# Patient Record
Sex: FEMALE | Race: BLACK OR AFRICAN AMERICAN | NOT HISPANIC OR LATINO | Employment: FULL TIME | URBAN - METROPOLITAN AREA
[De-identification: names, ages, dates, MRNs, and addresses within clinical notes are randomized per-mention and may not be internally consistent; named-entity substitution may affect disease eponyms.]

---

## 2017-04-04 ENCOUNTER — OFFICE VISIT (OUTPATIENT)
Dept: FAMILY MEDICINE | Facility: HOSPITAL | Age: 27
End: 2017-04-04
Attending: FAMILY MEDICINE
Payer: COMMERCIAL

## 2017-04-04 VITALS
WEIGHT: 204.13 LBS | HEART RATE: 89 BPM | SYSTOLIC BLOOD PRESSURE: 113 MMHG | DIASTOLIC BLOOD PRESSURE: 80 MMHG | HEIGHT: 62 IN | BODY MASS INDEX: 37.56 KG/M2

## 2017-04-04 DIAGNOSIS — J06.9 VIRAL UPPER RESPIRATORY TRACT INFECTION: ICD-10-CM

## 2017-04-04 DIAGNOSIS — R10.9 FLANK PAIN: Primary | ICD-10-CM

## 2017-04-04 LAB
B-HCG UR QL: NEGATIVE
BILIRUB SERPL-MCNC: 1 MG/DL
BLOOD, POC UA: NORMAL
CTP QC/QA: YES
GLUCOSE UR QL STRIP: NORMAL
KETONES UR QL STRIP: NORMAL
LEUKOCYTE ESTERASE URINE, POC: NORMAL
NITRITE, POC UA: NORMAL
PH, POC UA: 6
PROTEIN, POC: NORMAL
SPECIFIC GRAVITY, POC UA: 1.02
UROBILINOGEN, POC UA: NORMAL

## 2017-04-04 PROCEDURE — 99213 OFFICE O/P EST LOW 20 MIN: CPT | Performed by: FAMILY MEDICINE

## 2017-04-04 PROCEDURE — 81025 URINE PREGNANCY TEST: CPT | Performed by: FAMILY MEDICINE

## 2017-04-04 PROCEDURE — 81000 URINALYSIS NONAUTO W/SCOPE: CPT | Performed by: FAMILY MEDICINE

## 2017-04-04 RX ORDER — METHOCARBAMOL 500 MG/1
500 TABLET, FILM COATED ORAL 4 TIMES DAILY
Qty: 30 TABLET | Refills: 0 | Status: SHIPPED | OUTPATIENT
Start: 2017-04-04 | End: 2017-04-14

## 2017-04-04 RX ORDER — NAPROXEN 500 MG/1
500 TABLET ORAL 2 TIMES DAILY PRN
Qty: 60 TABLET | Refills: 0 | Status: SHIPPED | OUTPATIENT
Start: 2017-04-04 | End: 2018-07-19

## 2017-04-04 NOTE — PROGRESS NOTES
Subjective:       Patient ID: Evelin Taylor is a 26 y.o. female.    Chief Complaint: L sided abd pain    HPI   Pt is a 25 yo F here for an urgent visit with complaints of L flank pain. She states that it started 3 days. No inciting event or trauma. Sharp pain, lasting 3 minutes with no alleviating or aggravating symptoms. No f/c/cp/sob/n/v/urinary and bowel symptoms. Has not tried anything for the pain. Pt states her LMP was from 3/17 to 3/26. +sexually active. Currently no d/c or bleeding.     Review of Systems   Constitutional: Negative for chills, fatigue and fever.   HENT: Negative for sneezing and sore throat.    Eyes: Negative for pain.   Respiratory: Negative for cough, chest tightness, shortness of breath and wheezing.    Cardiovascular: Negative for chest pain and palpitations.   Gastrointestinal: Negative for abdominal pain, constipation, diarrhea, nausea and vomiting.   Genitourinary: Negative for dysuria.   Musculoskeletal: Negative for back pain.        L flank pain   Skin: Negative for rash.   Neurological: Negative for headaches.   Psychiatric/Behavioral: Negative for behavioral problems.       Objective:      Vitals:    04/04/17 1014   BP: 113/80   Pulse: 89     Physical Exam   Constitutional: She is oriented to person, place, and time. She appears well-developed and well-nourished.   Non toxic   HENT:   Head: Normocephalic and atraumatic.   Eyes: Conjunctivae are normal. Pupils are equal, round, and reactive to light.   Neck: Normal range of motion.   Cardiovascular: Normal rate, normal heart sounds and intact distal pulses.    Pulmonary/Chest: Effort normal and breath sounds normal. She has no wheezes. She exhibits no tenderness.   Abdominal: Soft. Bowel sounds are normal.   L side mild TTP to flank region. NO CVA. Mild TTP to LLQ, no rebound or guarding.    Neurological: She is alert and oriented to person, place, and time.   Skin: Skin is warm.   Psychiatric: She has a normal mood and  affect.       Assessment:       1. Flank pain            Plan:       Flank pain  - Likely MSK, will tx symptomatically  -     POCT URINALYSIS - wnl aside from trace leuk, no symptoms.   -     POCT Urine Pregnancy - neg  -     methocarbamol (ROBAXIN) 500 MG Tab; Take 1 tablet (500 mg total) by mouth 4 (four) times daily.  Dispense: 30 tablet; Refill: 0  -     naproxen (NAPROSYN) 500 MG tablet; Take 1 tablet (500 mg total) by mouth 2 (two) times daily as needed (take twice a day with meal for 3 days than as needed for pain).  Dispense: 60 tablet; Refill: 0  - Advised pt if symptoms worsen to return to clinic or seek medical attention    Return if symptoms worsen or fail to improve.

## 2017-10-31 ENCOUNTER — OFFICE VISIT (OUTPATIENT)
Dept: FAMILY MEDICINE | Facility: HOSPITAL | Age: 27
End: 2017-10-31
Attending: FAMILY MEDICINE
Payer: COMMERCIAL

## 2017-10-31 VITALS
HEART RATE: 77 BPM | BODY MASS INDEX: 40.08 KG/M2 | SYSTOLIC BLOOD PRESSURE: 109 MMHG | DIASTOLIC BLOOD PRESSURE: 69 MMHG | WEIGHT: 217.81 LBS | HEIGHT: 62 IN

## 2017-10-31 DIAGNOSIS — Z00.00 ANNUAL PHYSICAL EXAM: Primary | ICD-10-CM

## 2017-10-31 DIAGNOSIS — Z28.21 INFLUENZA VACCINATION DECLINED BY PATIENT: ICD-10-CM

## 2017-10-31 PROCEDURE — 99213 OFFICE O/P EST LOW 20 MIN: CPT | Performed by: FAMILY MEDICINE

## 2017-10-31 NOTE — PROGRESS NOTES
Subjective:       Patient ID: Evelin Taylor is a 26 y.o. female.    Chief Complaint: Annual Exam    HPI   Ms. Taylor is a 27 yo female with no significant PMHx presenting for an annual physical exam. Patient has no complaints. She states that she is in the process of becoming a  and therefore needs a physical exam. Patient reports feeling well and states that she only takes naproxen for menstrual cramps. She denies any tobacco, EtOH or illicit drug use. Denies any F/C, HA, SOB or chest pain.    Review of Systems   Constitutional: Negative for chills and fever.   HENT: Negative for congestion and rhinorrhea.    Respiratory: Negative for cough, shortness of breath and wheezing.    Cardiovascular: Negative for chest pain.   Gastrointestinal: Negative for abdominal pain, nausea and vomiting.   Genitourinary: Negative for difficulty urinating.   Musculoskeletal: Negative for back pain and neck pain.   Neurological: Negative for dizziness and headaches.       Objective:      Vitals:    10/31/17 0849   BP: 109/69   Pulse: 77     Physical Exam   Constitutional: She is oriented to person, place, and time. No distress.   HENT:   Head: Normocephalic and atraumatic.   Cardiovascular: Normal rate, regular rhythm and normal heart sounds.    No murmur heard.  Pulmonary/Chest: Effort normal and breath sounds normal. No respiratory distress. She has no wheezes.   Abdominal: Soft. Bowel sounds are normal. She exhibits no distension. There is no tenderness.   Musculoskeletal: Normal range of motion. She exhibits no edema or tenderness.   Neurological: She is alert and oriented to person, place, and time.   Skin: She is not diaphoretic.       Assessment:       1. Annual physical exam    2. Influenza vaccination declined by patient        Plan:       Annual physical exam  - Physical examination form for  applicant completed and given to patient  - Patient states that she saw her OB/Gyn this year and  had her Pap smear done at the time.    Influenza vaccination declined by patient      Return in about 1 year (around 10/31/2018), or if symptoms worsen or fail to improve.

## 2018-01-16 ENCOUNTER — OFFICE VISIT (OUTPATIENT)
Dept: FAMILY MEDICINE | Facility: HOSPITAL | Age: 28
End: 2018-01-16
Attending: FAMILY MEDICINE
Payer: COMMERCIAL

## 2018-01-16 VITALS
SYSTOLIC BLOOD PRESSURE: 122 MMHG | HEART RATE: 80 BPM | HEIGHT: 62 IN | BODY MASS INDEX: 41.26 KG/M2 | DIASTOLIC BLOOD PRESSURE: 83 MMHG | WEIGHT: 224.19 LBS

## 2018-01-16 DIAGNOSIS — R10.9 ABDOMINAL CRAMPS: ICD-10-CM

## 2018-01-16 DIAGNOSIS — R11.0 NAUSEA: ICD-10-CM

## 2018-01-16 DIAGNOSIS — K52.9 GASTROENTERITIS: Primary | ICD-10-CM

## 2018-01-16 PROCEDURE — 99213 OFFICE O/P EST LOW 20 MIN: CPT | Performed by: FAMILY MEDICINE

## 2018-01-16 RX ORDER — IBUPROFEN 600 MG/1
600 TABLET ORAL EVERY 8 HOURS PRN
Qty: 21 TABLET | Refills: 0 | Status: SHIPPED | OUTPATIENT
Start: 2018-01-16 | End: 2018-01-23

## 2018-01-16 RX ORDER — ONDANSETRON 4 MG/1
4 TABLET, FILM COATED ORAL EVERY 8 HOURS PRN
Qty: 21 TABLET | Refills: 0 | Status: SHIPPED | OUTPATIENT
Start: 2018-01-16 | End: 2018-01-23

## 2018-01-16 NOTE — LETTER
January 16, 2018    Evelin Taylor  [address]   [place] [address]         [facility] [facility] [place]  [place] [address]  Phone: [phone]  Fax: [phone] January 16, 2018     Patient: Evelin Taylor   YOB: 1990   Date of Visit: 1/16/2018       To Whom It May Concern:    Ms. Evelin Taylor was seen in our facility on 01/16/18 for an acute illness. She is clear to return to work on 01/19/18.     If you have any questions or concerns, please don't hesitate to call.    Sincerely,        Atilio Roman MD

## 2018-01-16 NOTE — PROGRESS NOTES
Subjective:       Patient ID: Evelin Taylor is a 27 y.o. female.    Chief Complaint: Nausea and Diarrhea    Ms. Taylor is a 27 year old female with no PMH who presents urgently for diarrhea and N. Patient reports the symptoms started yesterday after work with diarrhea. She tried pepto-bismal which did not help much. The diarrhea continued and she tried imodium this AM without any help. Has been eating and drinking without V but with continued diarrhea. No current N. Occ abd cramping with episodes. No sick contacts, does report eating popeyes a few days ago which may have made her sick.   Denies Etoh, tobacco.      Review of Systems   Constitutional: Negative for chills and fever.   HENT: Negative for ear pain, hearing loss and sore throat.    Eyes: Negative for pain and redness.   Respiratory: Negative for cough and shortness of breath.    Cardiovascular: Negative for chest pain and palpitations.   Gastrointestinal: Positive for abdominal pain, diarrhea and nausea. Negative for vomiting.   Genitourinary: Negative for difficulty urinating and dysuria.   Musculoskeletal: Negative for arthralgias and myalgias.   Skin: Negative for rash.   Neurological: Negative for syncope and headaches.   Psychiatric/Behavioral: Negative for agitation and confusion.       Objective:      Vitals:    01/16/18 1526   BP: 122/83   Pulse: 80     Physical Exam   Constitutional: She is oriented to person, place, and time. She appears well-developed and well-nourished. No distress.   HENT:   Head: Normocephalic and atraumatic.   Right Ear: External ear normal.   Left Ear: External ear normal.   Eyes: Conjunctivae are normal. Pupils are equal, round, and reactive to light. Right eye exhibits no discharge. Left eye exhibits no discharge.   Neck: Normal range of motion. Neck supple.   Cardiovascular: Normal rate and regular rhythm.  Exam reveals no gallop and no friction rub.    No murmur heard.  Pulmonary/Chest: Effort normal and  breath sounds normal. No respiratory distress. She has no wheezes. She has no rales.   Abdominal: Soft. Bowel sounds are normal. She exhibits no distension. There is no tenderness.   Minor TTP, no rebound or guarding.    Musculoskeletal: Normal range of motion.   Neurological: She is alert and oriented to person, place, and time. She has normal reflexes.   Skin: Skin is warm. No rash noted. No erythema.   Psychiatric: She has a normal mood and affect. Her behavior is normal.       Assessment:       1. Gastroenteritis    2. Nausea    3. Abdominal cramps        Plan:       Gastroenteritis  -     ibuprofen (ADVIL,MOTRIN) 600 MG tablet; Take 1 tablet (600 mg total) by mouth every 8 (eight) hours as needed for Pain.  Dispense: 21 tablet; Refill: 0  -     ondansetron (ZOFRAN) 4 MG tablet; Take 1 tablet (4 mg total) by mouth every 8 (eight) hours as needed for Nausea.  Dispense: 21 tablet; Refill: 0    Nausea  -     ondansetron (ZOFRAN) 4 MG tablet; Take 1 tablet (4 mg total) by mouth every 8 (eight) hours as needed for Nausea.  Dispense: 21 tablet; Refill: 0    Abdominal cramps  -     ibuprofen (ADVIL,MOTRIN) 600 MG tablet; Take 1 tablet (600 mg total) by mouth every 8 (eight) hours as needed for Pain.  Dispense: 21 tablet; Refill: 0    Counseled on diet, small frequent meals and return precautions.     Follow-up if symptoms worsen or fail to improve.

## 2018-05-24 ENCOUNTER — OFFICE VISIT (OUTPATIENT)
Dept: FAMILY MEDICINE | Facility: HOSPITAL | Age: 28
End: 2018-05-24
Attending: FAMILY MEDICINE
Payer: COMMERCIAL

## 2018-05-24 ENCOUNTER — HOSPITAL ENCOUNTER (OUTPATIENT)
Dept: RADIOLOGY | Facility: HOSPITAL | Age: 28
Discharge: HOME | End: 2018-05-24
Attending: STUDENT IN AN ORGANIZED HEALTH CARE EDUCATION/TRAINING PROGRAM
Payer: COMMERCIAL

## 2018-05-24 VITALS
BODY MASS INDEX: 41.99 KG/M2 | DIASTOLIC BLOOD PRESSURE: 81 MMHG | SYSTOLIC BLOOD PRESSURE: 126 MMHG | HEIGHT: 62 IN | HEART RATE: 78 BPM | WEIGHT: 228.19 LBS

## 2018-05-24 DIAGNOSIS — I88.9 CERVICAL LYMPHADENITIS: ICD-10-CM

## 2018-05-24 DIAGNOSIS — I88.9 CERVICAL LYMPHADENITIS: Primary | ICD-10-CM

## 2018-05-24 PROCEDURE — 71046 X-RAY EXAM CHEST 2 VIEWS: CPT | Mod: TC,FY

## 2018-05-24 PROCEDURE — 99213 OFFICE O/P EST LOW 20 MIN: CPT | Mod: 25 | Performed by: STUDENT IN AN ORGANIZED HEALTH CARE EDUCATION/TRAINING PROGRAM

## 2018-05-24 PROCEDURE — 71046 X-RAY EXAM CHEST 2 VIEWS: CPT | Mod: 26,,, | Performed by: RADIOLOGY

## 2018-05-24 RX ORDER — PNV 112/IRON/FOLIC/OM3/DHA/EPA 3.33-.33MG
TABLET,CHEWABLE ORAL
COMMUNITY
Start: 2018-02-21 | End: 2018-07-19

## 2018-05-24 NOTE — PROGRESS NOTES
Subjective:       Patient ID: Evelin Taylor is a 27 y.o. female.    Chief Complaint: Neck Pain    Neck Pain    Pertinent negatives include no chest pain, fever or headaches.      28 yo F w/ h/o cervical lymphatitis who presents for urgent evaluation of neck pain. Neck pain has been present since 2 days ago. Denies any trauma to the area. Patient has a history of recurrent cervical lymphadenitis with prior surgery in 2005. Biopsy obtained at that time revealed no malignancy. Patient unsure of other details surrounding the workup and surgery as she was only 14 at the time of surgery. No records available on Epic. No headaches, facial tenderness. She denies f/c, n/v/d, cough, sob, palpitations, sore throat, rhinorrhea, or ear drainage.      Review of Systems   Constitutional: Negative for chills and fever.   HENT: Negative for congestion and sore throat.         Enlarged lymph node.   Respiratory: Negative for cough, shortness of breath and wheezing.    Cardiovascular: Negative for chest pain, palpitations and leg swelling.   Gastrointestinal: Negative for abdominal pain, constipation, diarrhea, nausea and vomiting.   Genitourinary: Negative for dysuria.   Musculoskeletal: Positive for neck pain.   Neurological: Negative for dizziness, light-headedness and headaches.   Psychiatric/Behavioral: Negative for agitation.       Objective:      Vitals:    05/24/18 0957   BP: 126/81   Pulse: 78     Physical Exam   Constitutional: She is oriented to person, place, and time. She appears well-developed and well-nourished.   HENT:   Head: Normocephalic.   Right Ear: External ear normal. No drainage.   Left Ear: External ear normal. No drainage.   Nose: No rhinorrhea. Right sinus exhibits no maxillary sinus tenderness and no frontal sinus tenderness. Left sinus exhibits no maxillary sinus tenderness and no frontal sinus tenderness.   Mouth/Throat: Oropharynx is clear and moist and mucous membranes are normal. No  oropharyngeal exudate. No tonsillar exudate.   Eyes: EOM are normal. Pupils are equal, round, and reactive to light.   Neck: Normal range of motion. Neck supple. No neck rigidity. No thyromegaly present.   Enlarged L cervical lymph node.   Cardiovascular: Normal rate, regular rhythm and normal heart sounds.  Exam reveals no gallop and no friction rub.    No murmur heard.  Pulmonary/Chest: Effort normal and breath sounds normal. She has no wheezes. She has no rales.   Abdominal: Soft. Bowel sounds are normal. She exhibits no distension. There is no tenderness.   Lymphadenopathy:     She has no cervical adenopathy.   Neurological: She is alert and oriented to person, place, and time.   Skin: Skin is warm and dry.   Psychiatric: She has a normal mood and affect. Her behavior is normal. Judgment and thought content normal.   Nursing note and vitals reviewed.      Assessment:       1. Cervical lymphadenitis        Plan:       Evelin was seen today for neck pain.    Diagnoses and all orders for this visit:    Cervical lymphadenitis  -     CBC auto differential; Future  -     Comprehensive metabolic panel; Future  -     TSH; Future  -     X-Ray Chest PA And Lateral; Future    Will attempt to obtain medical records from [facility], where the surgery was performed in 2005.    Follow-up in about 2 weeks (around 6/7/2018).

## 2018-05-24 NOTE — PROGRESS NOTES
I have reviewed the notes, assessments, and/or procedures performed, I concur with her/his documentation of Evelin Taylor.

## 2018-05-28 ENCOUNTER — TELEPHONE (OUTPATIENT)
Dept: FAMILY MEDICINE | Facility: HOSPITAL | Age: 28
End: 2018-05-28

## 2018-05-28 NOTE — TELEPHONE ENCOUNTER
----- Message from Taylor Trujillo sent at 5/25/2018  8:18 AM CDT -----  Patient wants to talk about her test results

## 2018-05-29 ENCOUNTER — TELEPHONE (OUTPATIENT)
Dept: FAMILY MEDICINE | Facility: HOSPITAL | Age: 28
End: 2018-05-29

## 2018-05-29 NOTE — TELEPHONE ENCOUNTER
----- Message from Taylor Trujillo sent at 5/29/2018 10:55 AM CDT -----  Patient wants  to explain to her about her test results

## 2018-07-19 ENCOUNTER — OFFICE VISIT (OUTPATIENT)
Dept: FAMILY MEDICINE | Facility: HOSPITAL | Age: 28
End: 2018-07-19
Attending: FAMILY MEDICINE
Payer: COMMERCIAL

## 2018-07-19 VITALS
WEIGHT: 229.5 LBS | HEART RATE: 88 BPM | BODY MASS INDEX: 42.23 KG/M2 | HEIGHT: 62 IN | DIASTOLIC BLOOD PRESSURE: 78 MMHG | SYSTOLIC BLOOD PRESSURE: 120 MMHG

## 2018-07-19 DIAGNOSIS — R30.0 DYSURIA: ICD-10-CM

## 2018-07-19 DIAGNOSIS — N39.0 URINARY TRACT INFECTION WITHOUT HEMATURIA, SITE UNSPECIFIED: Primary | ICD-10-CM

## 2018-07-19 PROCEDURE — 99213 OFFICE O/P EST LOW 20 MIN: CPT | Performed by: FAMILY MEDICINE

## 2018-07-19 RX ORDER — SULFAMETHOXAZOLE AND TRIMETHOPRIM 800; 160 MG/1; MG/1
1 TABLET ORAL 2 TIMES DAILY
Qty: 6 TABLET | Refills: 0 | Status: SHIPPED | OUTPATIENT
Start: 2018-07-19 | End: 2018-07-22

## 2018-07-19 NOTE — PROGRESS NOTES
Subjective:       Patient ID: Evelin Taylor is a 27 y.o. female.    Chief Complaint: side pain and acne    Evelin Taylor is a 27 y.o. female presenting urgently for a 2 wk Hx R flank pain and a 2 d Hx acne exacerbation.  She has no pertinent PMH.  Her flank pain is described as moderate and associated with burning on urination.  She denies fever, chills, N/V, increased frequency, or urine color change.  She is sexually active with one partner and does not use protection. LMP 6/26.      Review of Systems   Constitutional: Negative for activity change, appetite change, chills and fever.   HENT: Negative for congestion and sore throat.    Respiratory: Negative for chest tightness and shortness of breath.    Cardiovascular: Negative for chest pain and palpitations.   Gastrointestinal: Negative for abdominal distention, abdominal pain, diarrhea, nausea and vomiting.   Genitourinary: Positive for difficulty urinating, dysuria and flank pain. Negative for decreased urine volume, frequency, hematuria, menstrual problem, pelvic pain, urgency, vaginal bleeding, vaginal discharge and vaginal pain.   Musculoskeletal: Negative for arthralgias and myalgias.   Skin: Negative.    Neurological: Negative for dizziness, syncope and headaches.   Psychiatric/Behavioral: Negative.        Objective:      Vitals:    07/19/18 1002   BP: 120/78   Pulse: 88     Physical Exam   Constitutional: She is oriented to person, place, and time. She appears well-developed and well-nourished.   HENT:   Head: Normocephalic and atraumatic.   Eyes: Conjunctivae and EOM are normal.   Neck: Normal range of motion. Neck supple.   Cardiovascular: Normal rate, regular rhythm, normal heart sounds and intact distal pulses.  Exam reveals no gallop and no friction rub.    No murmur heard.  Pulmonary/Chest: Effort normal and breath sounds normal. No respiratory distress.   Abdominal: Soft. There is no tenderness.   Musculoskeletal: Normal range  of motion. She exhibits no edema.   No CVA tenderness. No suprapubic tenderness.   Neurological: She is alert and oriented to person, place, and time.   Skin: Skin is warm and dry. Capillary refill takes less than 2 seconds.   Psychiatric: She has a normal mood and affect. Her behavior is normal.   Nursing note and vitals reviewed.      LABS:    -UA trace leukocytes   -UPT neg  Assessment:       1. Urinary tract infection without hematuria, site unspecified    2. Dysuria        Plan:       Urinary tract infection without hematuria, site unspecified  -     sulfamethoxazole-trimethoprim 800-160mg (BACTRIM DS) 800-160 mg Tab; Take 1 tablet by mouth 2 (two) times daily. for 3 days  Dispense: 6 tablet; Refill: 0    Dysuria  -     POCT URINALYSIS  -     POCT URINE PREGNANCY  -     C. trachomatis/N. gonorrhoeae by AMP DNA Urine    Other orders  -     benzoyl peroxide (BREVOXYL) 4 % external liquid; Apply topically 2 (two) times daily.  Dispense: 1 Bottle; Refill: 12      Follow-up in about 1 week (around 7/26/2018), or if symptoms worsen or fail to improve.

## 2018-07-20 ENCOUNTER — TELEPHONE (OUTPATIENT)
Dept: FAMILY MEDICINE | Facility: HOSPITAL | Age: 28
End: 2018-07-20

## 2018-07-20 NOTE — TELEPHONE ENCOUNTER
----- Message from Taylor Trujillo sent at 7/19/2018  4:00 PM CDT -----  Contact: [phone]  Mami from [facility] pharmacy called to let  know they do not carry benzoyl peroxide (BREVOXYL) 4 % external liquid. Want to know if  can sent a different prescription

## 2018-07-20 NOTE — TELEPHONE ENCOUNTER
----- Message from Ana Hood MA sent at 7/20/2018  9:15 AM CDT -----  Please call patient; pharmacy does not have the peroxide rx that was prescribed.  Thanks.

## 2018-07-30 RX ORDER — CLINDAMYCIN AND BENZOYL PEROXIDE 10; 50 MG/G; MG/G
GEL TOPICAL 2 TIMES DAILY
Qty: 50 G | Refills: 11 | Status: SHIPPED | OUTPATIENT
Start: 2018-07-30 | End: 2019-05-13

## 2018-08-02 NOTE — PROGRESS NOTES
I was present in clinic during the visit and assume the primary medical care of this patient.  I discussed the case with Dr. Tubbs, and I have reviewed and agree with the assessment and plan.

## 2018-09-06 ENCOUNTER — OFFICE VISIT (OUTPATIENT)
Dept: FAMILY MEDICINE | Facility: HOSPITAL | Age: 28
End: 2018-09-06
Attending: FAMILY MEDICINE
Payer: COMMERCIAL

## 2018-09-06 VITALS
HEART RATE: 84 BPM | DIASTOLIC BLOOD PRESSURE: 88 MMHG | HEIGHT: 62 IN | SYSTOLIC BLOOD PRESSURE: 125 MMHG | BODY MASS INDEX: 41.06 KG/M2 | WEIGHT: 223.13 LBS

## 2018-09-06 DIAGNOSIS — K12.1 ULCER (TRAUMATIC) OF ORAL MUCOSA: ICD-10-CM

## 2018-09-06 PROCEDURE — 99213 OFFICE O/P EST LOW 20 MIN: CPT | Performed by: STUDENT IN AN ORGANIZED HEALTH CARE EDUCATION/TRAINING PROGRAM

## 2018-09-06 NOTE — PROGRESS NOTES
"Subjective:       Patient ID: Evelin Taylor is a 27 y.o. female.    Chief Complaint: Edema (lip)    HPI Ms. Taylor is a 28yo female with no PMHx who presents to the clinic with lip swelling of 3 days duration. She states the swelling started on Tuesday, Sept 4, as a small bump and then it proceeded to grow in size as well as have small white bumps. It is located on the inside of her left upper lip. She has not tried anything to treat this swelling. She denies any allergies, trying new foods, new make-up, new topical lip balms, or history of cold sores. She states it is not really painful, but more bothersome because of the increased swelling. It has not bled. She recently got braces (on August 30), and has not been able to eat much since then due to her teeth feeling "tight." She brushes her teeth per the dentist's instructions and uses a prescribed mouth wash.     ROS: as above.     Objective:      Vitals:    09/06/18 0957   BP: 125/88   Pulse: 84     Physical Exam   Constitutional: She appears well-developed and well-nourished.   HENT:   Head: Normocephalic and atraumatic.   Mouth/Throat: Uvula is midline, oropharynx is clear and moist and mucous membranes are normal. Oral lesions present. Abnormal dentition (Braces in place upper and lower).       Eyes: Conjunctivae and EOM are normal. Pupils are equal, round, and reactive to light.   Neck: Trachea normal, normal range of motion, full passive range of motion without pain and phonation normal. Neck supple. No thyroid mass and no thyromegaly present.   Lymphadenopathy:     She has no cervical adenopathy.       Assessment:       1. Ulcer (traumatic) of oral mucosa        Plan:       Ulcer (traumatic) of oral mucosa  -Patient likely has traumatic ulcer related to recent braces on left upper inner lip mucosa. Discussed using orajel over the counter for symptomatic relief. She will also continue using her mouthwash and brushing her teeth thoroughly per her " orthodontist to prevent infection. Discussed decreasing irritation of the area by being mindful when eating and drinking as well as not chewing her lip as this can make the swelling worse.     Follow-up if symptoms worsen or fail to improve.

## 2018-09-13 NOTE — PROGRESS NOTES
I examined the patient, and assume primary medical care.  I reviewed the case with Dr. Gutierrez, and I have reviewed and agree with the History, physical, assessment and plan.

## 2018-11-18 ENCOUNTER — HOSPITAL ENCOUNTER (EMERGENCY)
Facility: HOSPITAL | Age: 28
Discharge: HOME | End: 2018-11-18
Attending: EMERGENCY MEDICINE
Payer: COMMERCIAL

## 2018-11-18 VITALS
DIASTOLIC BLOOD PRESSURE: 93 MMHG | BODY MASS INDEX: 33.13 KG/M2 | SYSTOLIC BLOOD PRESSURE: 134 MMHG | HEIGHT: 62 IN | TEMPERATURE: 99 F | HEART RATE: 86 BPM | RESPIRATION RATE: 14 BRPM | WEIGHT: 180 LBS | OXYGEN SATURATION: 99 %

## 2018-11-18 DIAGNOSIS — M54.50 ACUTE BILATERAL LOW BACK PAIN WITHOUT SCIATICA: Primary | ICD-10-CM

## 2018-11-18 PROCEDURE — 99283 EMERGENCY DEPT VISIT LOW MDM: CPT

## 2018-11-18 RX ORDER — METHOCARBAMOL 500 MG/1
500 TABLET, FILM COATED ORAL 3 TIMES DAILY
Qty: 15 TABLET | Refills: 0 | Status: SHIPPED | OUTPATIENT
Start: 2018-11-18 | End: 2018-11-23
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
## 2018-11-27 ENCOUNTER — OFFICE VISIT (OUTPATIENT)
Dept: FAMILY MEDICINE | Facility: HOSPITAL | Age: 28
End: 2018-11-27
Attending: FAMILY MEDICINE
Payer: COMMERCIAL

## 2018-11-27 VITALS
BODY MASS INDEX: 41.26 KG/M2 | WEIGHT: 224.19 LBS | HEART RATE: 82 BPM | SYSTOLIC BLOOD PRESSURE: 129 MMHG | HEIGHT: 62 IN | DIASTOLIC BLOOD PRESSURE: 83 MMHG

## 2018-11-27 DIAGNOSIS — R05.8 POST-VIRAL COUGH SYNDROME: ICD-10-CM

## 2018-11-27 DIAGNOSIS — S29.012A MUSCLE STRAIN OF LEFT UPPER BACK, INITIAL ENCOUNTER: Primary | ICD-10-CM

## 2018-11-27 PROCEDURE — 99213 OFFICE O/P EST LOW 20 MIN: CPT | Performed by: FAMILY MEDICINE

## 2018-11-27 RX ORDER — FLUTICASONE PROPIONATE 50 MCG
1 SPRAY, SUSPENSION (ML) NASAL DAILY
Qty: 16 G | Refills: 0 | Status: SHIPPED | OUTPATIENT
Start: 2018-11-27 | End: 2019-05-13

## 2018-11-27 RX ORDER — CYCLOBENZAPRINE HCL 10 MG
10 TABLET ORAL NIGHTLY
Qty: 10 TABLET | Refills: 0 | Status: SHIPPED | OUTPATIENT
Start: 2018-11-27 | End: 2018-12-07

## 2018-11-27 RX ORDER — IBUPROFEN 600 MG/1
600 TABLET ORAL 3 TIMES DAILY
Qty: 30 TABLET | Refills: 0 | Status: SHIPPED | OUTPATIENT
Start: 2018-11-27 | End: 2018-12-07
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
## 2018-11-27 NOTE — PROGRESS NOTES
I have reviewed the notes, assessments, and/or procedures performed by Dr. Melendez, I concur with her/his documentation of Evelin Taylor.

## 2019-01-16 ENCOUNTER — OFFICE VISIT (OUTPATIENT)
Dept: FAMILY MEDICINE | Facility: HOSPITAL | Age: 29
End: 2019-01-16
Attending: FAMILY MEDICINE
Payer: COMMERCIAL

## 2019-01-16 VITALS
HEIGHT: 62 IN | SYSTOLIC BLOOD PRESSURE: 124 MMHG | WEIGHT: 225.5 LBS | HEART RATE: 90 BPM | DIASTOLIC BLOOD PRESSURE: 86 MMHG | BODY MASS INDEX: 41.49 KG/M2

## 2019-01-16 DIAGNOSIS — H60.391 OTHER INFECTIVE ACUTE OTITIS EXTERNA OF RIGHT EAR: Primary | ICD-10-CM

## 2019-01-16 DIAGNOSIS — S00.411A ABRASION OF RIGHT EAR CANAL, INITIAL ENCOUNTER: ICD-10-CM

## 2019-01-16 PROBLEM — H60.501 ACUTE OTITIS EXTERNA OF RIGHT EAR: Status: ACTIVE | Noted: 2019-01-16

## 2019-01-16 PROCEDURE — 99213 OFFICE O/P EST LOW 20 MIN: CPT | Performed by: STUDENT IN AN ORGANIZED HEALTH CARE EDUCATION/TRAINING PROGRAM

## 2019-01-16 RX ORDER — CIPROFLOXACIN AND DEXAMETHASONE 3; 1 MG/ML; MG/ML
4 SUSPENSION/ DROPS AURICULAR (OTIC) 2 TIMES DAILY
Qty: 7.5 ML | Refills: 0 | Status: SHIPPED | OUTPATIENT
Start: 2019-01-16 | End: 2019-01-23

## 2019-01-16 NOTE — PROGRESS NOTES
Subjective:       Patient ID: Evelin Taylor is a 28 y.o. female.    Chief Complaint: Object in Right ear   HPI   27 y/o female with no pmhx presents to the clinic complaining of right ear pain after using a Q-tip. Patient states that she was cleaning her ears 2 days ago, when the q-tip broke and patient felt object that remained in her ear. Patient denies any associated fevers, chills n/v, HA, sore throat and ear discharge. Patient did not try to remove any objects from ear prior to visit today. No other complaints.     Review of Systems   Constitutional: Negative for activity change, chills, fatigue and fever.   HENT: Positive for ear pain. Negative for hearing loss, sore throat and trouble swallowing.    Eyes: Negative for pain, discharge, redness and itching.   Respiratory: Negative for chest tightness and shortness of breath.    Cardiovascular: Negative for chest pain.   Gastrointestinal: Negative for abdominal pain.   Endocrine: Negative for polyuria.   Genitourinary: Negative for dysuria, pelvic pain and urgency.   Musculoskeletal: Negative for arthralgias.   Skin: Negative for color change.   Allergic/Immunologic: Negative for immunocompromised state.   Neurological: Negative for headaches.   Hematological: Negative for adenopathy.   Psychiatric/Behavioral: The patient is not nervous/anxious.        Objective:      Vitals:    01/16/19 1019   BP: 124/86   Pulse: 90     Physical Exam   Constitutional: She is oriented to person, place, and time. She appears well-developed and well-nourished.   HENT:   Head: Normocephalic and atraumatic.   Right ear canal is erythematous. No purulence noted. Trauma on aspect of epithelium anterior to the TM on the right.    Eyes: EOM are normal. Pupils are equal, round, and reactive to light.   Neck: Normal range of motion. Neck supple.   Cardiovascular: Normal rate, regular rhythm and normal heart sounds.   Pulmonary/Chest: Effort normal and breath sounds normal.    Abdominal: Soft. Bowel sounds are normal.   Musculoskeletal: Normal range of motion.   Neurological: She is alert and oriented to person, place, and time.   Skin: Skin is warm and dry. Capillary refill takes less than 2 seconds.   Psychiatric: She has a normal mood and affect.       Assessment:       1. Other infective acute otitis externa of right ear    2. Abrasion of right ear canal, initial encounter        Plan:       Other infective acute otitis externa of right ear  -     ciprofloxacin-dexamethasone 0.3-0.1% (CIPRODEX); Place  into the right ear 2 (two) times daily. for 7 days     Abrasion of right ear canal, initial encounter  -     As per treatment above.  -     Patient instructed to return to clinic if no improvement after 5 days to be evaluated.     Follow-up if symptoms worsen or fail to improve.     Quan Castillo MD  [facility], PGY-1

## 2019-03-10 ENCOUNTER — OFFICE VISIT (OUTPATIENT)
Dept: URGENT CARE | Facility: CLINIC | Age: 29
End: 2019-03-10
Payer: COMMERCIAL

## 2019-03-10 VITALS
TEMPERATURE: 99 F | WEIGHT: 189 LBS | SYSTOLIC BLOOD PRESSURE: 130 MMHG | RESPIRATION RATE: 18 BRPM | HEIGHT: 62 IN | DIASTOLIC BLOOD PRESSURE: 91 MMHG | HEART RATE: 78 BPM | BODY MASS INDEX: 34.78 KG/M2 | OXYGEN SATURATION: 99 %

## 2019-03-10 DIAGNOSIS — R30.0 DYSURIA: ICD-10-CM

## 2019-03-10 DIAGNOSIS — N34.2 URETHRITIS: Primary | ICD-10-CM

## 2019-03-10 DIAGNOSIS — L24.9 IRRITANT DERMATITIS: ICD-10-CM

## 2019-03-10 LAB
BILIRUB UR QL STRIP: NEGATIVE
GLUCOSE UR QL STRIP: NEGATIVE
KETONES UR QL STRIP: NEGATIVE
LEUKOCYTE ESTERASE UR QL STRIP: POSITIVE
PH, POC UA: 6 (ref 5–8)
POC BLOOD, URINE: NEGATIVE
POC NITRATES, URINE: NEGATIVE
PROT UR QL STRIP: NEGATIVE
SP GR UR STRIP: 1.02 (ref 1–1.03)
UROBILINOGEN UR STRIP-ACNC: NORMAL (ref 0.1–1.1)

## 2019-03-10 PROCEDURE — 81003 POCT URINALYSIS, DIPSTICK, AUTOMATED, W/O SCOPE: ICD-10-PCS | Mod: QW,S$GLB,, | Performed by: PHYSICIAN ASSISTANT

## 2019-03-10 PROCEDURE — 81003 URINALYSIS AUTO W/O SCOPE: CPT | Mod: QW,S$GLB,, | Performed by: PHYSICIAN ASSISTANT

## 2019-03-10 PROCEDURE — 99214 OFFICE O/P EST MOD 30 MIN: CPT | Mod: 25,S$GLB,, | Performed by: PHYSICIAN ASSISTANT

## 2019-03-10 PROCEDURE — 3008F PR BODY MASS INDEX (BMI) DOCUMENTED: ICD-10-PCS | Mod: CPTII,S$GLB,, | Performed by: PHYSICIAN ASSISTANT

## 2019-03-10 PROCEDURE — 3008F BODY MASS INDEX DOCD: CPT | Mod: CPTII,S$GLB,, | Performed by: PHYSICIAN ASSISTANT

## 2019-03-10 PROCEDURE — 99214 PR OFFICE/OUTPT VISIT, EST, LEVL IV, 30-39 MIN: ICD-10-PCS | Mod: 25,S$GLB,, | Performed by: PHYSICIAN ASSISTANT

## 2019-03-10 RX ORDER — HYDROCORTISONE 25 MG/G
CREAM TOPICAL 2 TIMES DAILY
Qty: 28 G | Refills: 0 | Status: SHIPPED | OUTPATIENT
Start: 2019-03-10 | End: 2019-05-13

## 2019-03-10 RX ORDER — NITROFURANTOIN 25; 75 MG/1; MG/1
100 CAPSULE ORAL 2 TIMES DAILY
Qty: 14 CAPSULE | Refills: 0 | Status: SHIPPED | OUTPATIENT
Start: 2019-03-10 | End: 2019-03-17

## 2019-03-10 NOTE — PATIENT INSTRUCTIONS
Urethritis in Women     An inflamed urethra can cause pain during urination.   Urethritis occurs when the urethra is inflamed (red and swollen). This is the tube that passes urine from the bladder to outside the body. The urethra can become swollen and cause burning pain when you urinate. You may also have pain with sex. It can cause pain in the abdomen or pelvis. A urethral or vaginal discharge may also occur.  What causes urethritis?  Urethritis can be caused by a bacterial or viral infection. Such an infection can lead to conditions such as a urinary tract infection (UTI) or sexually transmitted disease (STD). Urethritis can also be caused by injury or sensitivity or allergy to chemicals in lotions and other products.  How is urethritis diagnosed?  Your healthcare provider will examine you and ask about your symptoms and health history. You may also have one or more of the following tests:  · Urine test to take samples of urine and have them checked for problems.  · Blood test to take a sample of blood and have it checked for problems.  · Vaginal culture to take a sample of vaginal discharge to have it tested for problems. A cotton swab is inserted into the vagina.  · Cystoscopy to allow the healthcare provider to look for problems in the urinary tract. The test uses a thin, flexible telescope called a cystoscope with a light and camera attached. The scope is inserted into the urethra.  · Ultrasound to allow the healthcare provider to see a detailed image of the inside of your pelvis. Ultrasound will not show whether you have urethritis, but it may show other signs of sexually transmitted diseases that can also cause urethritis. Ultrasound will not show whether you have urethritis, but it may show other signs of sexually transmitted diseases that can also cause urethritis.  · Nucleic acid test (LINDA) to detect the presence of a virus or bacteria. It may be done instead of a culture because it allows for a faster  diagnosis.  How is urethritis treated?  Treatment depends on the cause of urethritis. If its due to a bacterial infection, antibiotics (medicines that fight infection) will be given. Your healthcare provider can tell you more about your treatment options. In the meantime, your symptoms can be treated. To relieve pain and swelling, anti-inflammatory medicines, such as ibuprofen, may be given. Untreated, symptoms may get worse. It can also cause scar tissue to form in the urethra, causing it to narrow. And, it can lead to pelvic inflammatory disease.  When to call your healthcare provider   Call the healthcare provider right away if you have any of the following:  · Fever of 100.4°F (38.0°C) or higher   · Burning pain with urination  · Abdominal or pelvic pain  · Increased urge to urinate  · Discharge from the vagina      Preventing STDs  When it comes to sex, its important to take care and be safe. Any sexual contact with the penis, vagina, anus, or mouth can spread an STD. The only sure way to prevent STDs is abstinence (not having sex). But there are ways to make sex safer. Use a latex condom each time you have sex. And talk to your partner about STDs before you have sex.  Date Last Reviewed: 1/1/2017  © 7054-7095 [employer]. [address] [place] [address], [place] [address]. All rights reserved. This information is not intended as a substitute for professional medical care. Always follow your healthcare professional's instructions.        Contact Dermatitis  Contact dermatitis is a skin rash caused by something that touches the skin and makes it irritated and inflamed. Your skin may be red, swollen, dry, and may be cracked. Blisters may form and ooze. The rash will itch.  Contact dermatitis can form on the face and neck, backs of hands, forearms, genitals, and lower legs.  People can get contact dermatitis from lots of sources. These include:  · Plants such as poison ivy, oak, or sumac  · Chemicals in  "hair dyes and rinses, soaps, solvents, waxes, fingernail polish, and deodorants   · Jewelry or watchbands made of nickel  Contact dermatitis is not passed from person to person.  Talk with your healthcare provider about what may have caused the rash. A type of allergy testing called "patch testing" may be used to discover what you are allergic to. You will need to avoid the source of your rash in the future to prevent it from coming back.  Treatment is done to relieve itching and prevent the rash from coming back. The rash should go away in a few days to a few weeks.  Home care  Your healthcare provider may prescribe medicine to relieve swelling and itching. Follow all instructions when using these medicines.  General care:  · Avoid anything that heats up your skin, such as hot showers or baths, or direct sunlight. This can make itching worse.  · Apply cold compresses to soothe your sores to help relieve your symptoms. Do this for 30 minutes 3 to 4 times a day. You can make a cold compress by soaking a cloth in cold water. Squeeze out excess water. You can add colloidal oatmeal to the water to help reduce itching. For severe itching in a small area, apply an ice pack wrapped in a thin towel. Do this for 20 minutes 3 to 4 times a day.  · You can also try wet dressings. One way to do this is to wear a wet piece of clothing under a dry one. Wear a damp shirt under a dry shirt if your upper body is affected. This can relieve itching and prevent you from scratching the affected area.  · You can also help relieve large areas of itching by taking a lukewarm bath with colloidal oatmeal added to the water.  · Use hydrocortisone cream for redness and irritation, unless another medicine was prescribed. You can also use benzocaine anesthetic cream or spray. Calamine lotion can also relieve mild symptoms.  · Use oral diphenhydramine to help reduce itching. You can buy this antihistamine at drug and grocery stores. It can make " you sleepy, so use lower doses during the daytime. Or you can use loratadine. This is an antihistamine that will not make you sleepy. Do not use diphenhydramine if you have glaucoma or have trouble urinating due to an enlarged prostate.  · If a plant causes your rash, make sure to wash your skin and the clothes you were wearing when you came into contact with the plant. This is to wash away the plant oils that gave you the rash and prevent more or worse symptoms.  · Stay away from the substance or object that causes your symptoms. If you cant avoid it, wear gloves or some other type of protection.  Follow-up care  Follow up with your healthcare provider, or as advised.  When to seek medical advice  Call your healthcare provider right away if any of these occur:  · Spreading of the rash to other parts of your body  · Severe swelling of your face, eyelids, mouth, throat or tongue  · Trouble urinating due to swelling in the genital area  · Fever of 100.4°F (38°C) or higher  · Redness or swelling that gets worse  · Pain that gets worse  · Foul-smelling fluid leaking from the skin  · Yellow-brown crusts on the open blisters  Date Last Reviewed: 9/1/2016  © 0889-1016 [employer]. [address] [place] [address], [place] [address]. All rights reserved. This information is not intended as a substitute for professional medical care. Always follow your healthcare professional's instructions.    Bath bomb is source of irritant urethritis and an irritant dermatitis.  Discontinue use.    Please follow up with your Primary care provider within 2-5 days if your signs and symptoms have not resolved or worsen.     If your condition worsens or fails to improve we recommend that you receive another evaluation at the emergency room immediately or contact your primary medical clinic to discuss your concerns.   You must understand that you have received an Urgent Care treatment only and that you may be released before all of your  medical problems are known or treated. You, the patient, will arrange for follow up care as instructed.     RED FLAGS/WARNING SYMPTOMS DISCUSSED WITH PATIENT THAT WOULD WARRANT EMERGENT MEDICAL ATTENTION. PATIENT VERBALIZED UNDERSTANDING.     YOU HAVE BEEN GIVEN A PRESCRIPTION FOR ANTIBIOTICS. IT WAS ADVISED TO DELAY THE COURSE OF ANTIBIOTICS FOR 2-3 DAYS IF SYMPTOMS DO NOT RESOLVE. IT IMPORTANT TO FOLLOW THESE INSTRUCTIONS AS ANTIBIOTIC RESISTANCE IS HIGH. YOUR SYMPTOMS WILL LIKELY RESOLVE WITHOUT THIS PRESCRIPTIONS.

## 2019-03-10 NOTE — PROGRESS NOTES
"Subjective:       Patient ID: Evelin Taylor is a 28 y.o. female.    Vitals:  height is 5' 2" (1.575 m) and weight is 85.7 kg (189 lb). Her oral temperature is 98.9 °F (37.2 °C). Her blood pressure is 130/91 (abnormal) and her pulse is 78. Her respiration is 18 and oxygen saturation is 99%.     Chief Complaint: Vaginal Itching (burning after using a bath bomb x1 day)    Vaginal Itching   The patient's primary symptoms include genital itching. The patient's pertinent negatives include no genital odor, genital rash, missed menses or pelvic pain. This is a new problem. The current episode started yesterday. The problem occurs constantly. The problem has been unchanged. The patient is experiencing no pain. The problem affects both sides. She is not pregnant. Pertinent negatives include no abdominal pain, back pain, chills, dysuria, fever, flank pain, frequency, headaches, hematuria, nausea, rash, urgency or vomiting. She has tried nothing for the symptoms. The treatment provided no relief. She is sexually active. No, her partner does not have an STD. She uses nothing for contraception. Her menstrual history has been regular. Her past medical history is significant for a gynecological surgery. There is no history of an abdominal surgery, a  section, an ectopic pregnancy, ovarian cysts, PID or an STD.       Constitution: Negative for chills and fever.   Neck: Negative for painful lymph nodes.   Gastrointestinal: Negative for abdominal pain, history of abdominal surgery, nausea and vomiting.   Genitourinary: Positive for vaginal pain. Negative for dysuria, frequency, urgency, urine decreased, flank pain, hematuria, history of kidney stones, painful menstruation, irregular menstruation, missed menses, heavy menstrual bleeding, ovarian cysts, genital trauma, vaginal bleeding, vaginal odor, painful intercourse, genital sore, painful ejaculation and pelvic pain.   Musculoskeletal: Negative for back pain. "   Skin: Negative for rash and lesion.   Neurological: Negative for headaches.   Hematologic/Lymphatic: Negative for swollen lymph nodes.       Objective:      Physical Exam    Assessment:       1. Urethritis    2. Dysuria    3. Irritant dermatitis        Plan:         Urethritis  -     nitrofurantoin, macrocrystal-monohydrate, (MACROBID) 100 MG capsule; Take 1 capsule (100 mg total) by mouth 2 (two) times daily. for 7 days  Dispense: 14 capsule; Refill: 0    Dysuria  -     POCT Urinalysis, Dipstick, Automated, W/O Scope    Irritant dermatitis  -     hydrocortisone 2.5 % cream; Apply topically 2 (two) times daily.  Dispense: 28 g; Refill: 0          Urethritis in Women     An inflamed urethra can cause pain during urination.   Urethritis occurs when the urethra is inflamed (red and swollen). This is the tube that passes urine from the bladder to outside the body. The urethra can become swollen and cause burning pain when you urinate. You may also have pain with sex. It can cause pain in the abdomen or pelvis. A urethral or vaginal discharge may also occur.  What causes urethritis?  Urethritis can be caused by a bacterial or viral infection. Such an infection can lead to conditions such as a urinary tract infection (UTI) or sexually transmitted disease (STD). Urethritis can also be caused by injury or sensitivity or allergy to chemicals in lotions and other products.  How is urethritis diagnosed?  Your healthcare provider will examine you and ask about your symptoms and health history. You may also have one or more of the following tests:  · Urine test to take samples of urine and have them checked for problems.  · Blood test to take a sample of blood and have it checked for problems.  · Vaginal culture to take a sample of vaginal discharge to have it tested for problems. A cotton swab is inserted into the vagina.  · Cystoscopy to allow the healthcare provider to look for problems in the urinary tract. The test uses a  thin, flexible telescope called a cystoscope with a light and camera attached. The scope is inserted into the urethra.  · Ultrasound to allow the healthcare provider to see a detailed image of the inside of your pelvis. Ultrasound will not show whether you have urethritis, but it may show other signs of sexually transmitted diseases that can also cause urethritis. Ultrasound will not show whether you have urethritis, but it may show other signs of sexually transmitted diseases that can also cause urethritis.  · Nucleic acid test (LINDA) to detect the presence of a virus or bacteria. It may be done instead of a culture because it allows for a faster diagnosis.  How is urethritis treated?  Treatment depends on the cause of urethritis. If its due to a bacterial infection, antibiotics (medicines that fight infection) will be given. Your healthcare provider can tell you more about your treatment options. In the meantime, your symptoms can be treated. To relieve pain and swelling, anti-inflammatory medicines, such as ibuprofen, may be given. Untreated, symptoms may get worse. It can also cause scar tissue to form in the urethra, causing it to narrow. And, it can lead to pelvic inflammatory disease.  When to call your healthcare provider   Call the healthcare provider right away if you have any of the following:  · Fever of 100.4°F (38.0°C) or higher   · Burning pain with urination  · Abdominal or pelvic pain  · Increased urge to urinate  · Discharge from the vagina      Preventing STDs  When it comes to sex, its important to take care and be safe. Any sexual contact with the penis, vagina, anus, or mouth can spread an STD. The only sure way to prevent STDs is abstinence (not having sex). But there are ways to make sex safer. Use a latex condom each time you have sex. And talk to your partner about STDs before you have sex.  Date Last Reviewed: 1/1/2017  © 3684-7952 [employer]. [address] [facility],  "[place] [address]. All rights reserved. This information is not intended as a substitute for professional medical care. Always follow your healthcare professional's instructions.        Contact Dermatitis  Contact dermatitis is a skin rash caused by something that touches the skin and makes it irritated and inflamed. Your skin may be red, swollen, dry, and may be cracked. Blisters may form and ooze. The rash will itch.  Contact dermatitis can form on the face and neck, backs of hands, forearms, genitals, and lower legs.  People can get contact dermatitis from lots of sources. These include:  · Plants such as poison ivy, oak, or sumac  · Chemicals in hair dyes and rinses, soaps, solvents, waxes, fingernail polish, and deodorants   · Jewelry or watchbands made of nickel  Contact dermatitis is not passed from person to person.  Talk with your healthcare provider about what may have caused the rash. A type of allergy testing called "patch testing" may be used to discover what you are allergic to. You will need to avoid the source of your rash in the future to prevent it from coming back.  Treatment is done to relieve itching and prevent the rash from coming back. The rash should go away in a few days to a few weeks.  Home care  Your healthcare provider may prescribe medicine to relieve swelling and itching. Follow all instructions when using these medicines.  General care:  · Avoid anything that heats up your skin, such as hot showers or baths, or direct sunlight. This can make itching worse.  · Apply cold compresses to soothe your sores to help relieve your symptoms. Do this for 30 minutes 3 to 4 times a day. You can make a cold compress by soaking a cloth in cold water. Squeeze out excess water. You can add colloidal oatmeal to the water to help reduce itching. For severe itching in a small area, apply an ice pack wrapped in a thin towel. Do this for 20 minutes 3 to 4 times a day.  · You can also try wet dressings. " One way to do this is to wear a wet piece of clothing under a dry one. Wear a damp shirt under a dry shirt if your upper body is affected. This can relieve itching and prevent you from scratching the affected area.  · You can also help relieve large areas of itching by taking a lukewarm bath with colloidal oatmeal added to the water.  · Use hydrocortisone cream for redness and irritation, unless another medicine was prescribed. You can also use benzocaine anesthetic cream or spray. Calamine lotion can also relieve mild symptoms.  · Use oral diphenhydramine to help reduce itching. You can buy this antihistamine at drug and grocery stores. It can make you sleepy, so use lower doses during the daytime. Or you can use loratadine. This is an antihistamine that will not make you sleepy. Do not use diphenhydramine if you have glaucoma or have trouble urinating due to an enlarged prostate.  · If a plant causes your rash, make sure to wash your skin and the clothes you were wearing when you came into contact with the plant. This is to wash away the plant oils that gave you the rash and prevent more or worse symptoms.  · Stay away from the substance or object that causes your symptoms. If you cant avoid it, wear gloves or some other type of protection.  Follow-up care  Follow up with your healthcare provider, or as advised.  When to seek medical advice  Call your healthcare provider right away if any of these occur:  · Spreading of the rash to other parts of your body  · Severe swelling of your face, eyelids, mouth, throat or tongue  · Trouble urinating due to swelling in the genital area  · Fever of 100.4°F (38°C) or higher  · Redness or swelling that gets worse  · Pain that gets worse  · Foul-smelling fluid leaking from the skin  · Yellow-brown crusts on the open blisters  Date Last Reviewed: 9/1/2016  © 6848-6006 [employer]. [address] [place] [address], [place] [address]. All rights reserved. This information is  not intended as a substitute for professional medical care. Always follow your healthcare professional's instructions.    Bath bomb is source of irritant urethritis and an irritant dermatitis.  Discontinue use.    Please follow up with your Primary care provider within 2-5 days if your signs and symptoms have not resolved or worsen.     If your condition worsens or fails to improve we recommend that you receive another evaluation at the emergency room immediately or contact your primary medical clinic to discuss your concerns.   You must understand that you have received an Urgent Care treatment only and that you may be released before all of your medical problems are known or treated. You, the patient, will arrange for follow up care as instructed.     RED FLAGS/WARNING SYMPTOMS DISCUSSED WITH PATIENT THAT WOULD WARRANT EMERGENT MEDICAL ATTENTION. PATIENT VERBALIZED UNDERSTANDING.       YOU HAVE BEEN GIVEN A PRESCRIPTION FOR ANTIBIOTICS. IT WAS ADVISED TO DELAY THE COURSE OF ANTIBIOTICS FOR 2-3 DAYS IF SYMPTOMS DO NOT RESOLVE. IT IMPORTANT TO FOLLOW THESE INSTRUCTIONS AS ANTIBIOTIC RESISTANCE IS HIGH. YOUR SYMPTOMS WILL LIKELY RESOLVE WITHOUT THIS PRESCRIPTIONS.

## 2019-05-13 ENCOUNTER — OFFICE VISIT (OUTPATIENT)
Dept: URGENT CARE | Facility: CLINIC | Age: 29
End: 2019-05-13
Payer: COMMERCIAL

## 2019-05-13 VITALS
SYSTOLIC BLOOD PRESSURE: 136 MMHG | OXYGEN SATURATION: 100 % | RESPIRATION RATE: 16 BRPM | HEART RATE: 74 BPM | WEIGHT: 189 LBS | DIASTOLIC BLOOD PRESSURE: 92 MMHG | TEMPERATURE: 99 F | BODY MASS INDEX: 34.78 KG/M2 | HEIGHT: 62 IN

## 2019-05-13 DIAGNOSIS — R07.9 CHEST PAIN, UNSPECIFIED TYPE: Primary | ICD-10-CM

## 2019-05-13 DIAGNOSIS — M62.838 MUSCLE SPASM: ICD-10-CM

## 2019-05-13 DIAGNOSIS — M54.9 BACK PAIN, UNSPECIFIED BACK LOCATION, UNSPECIFIED BACK PAIN LATERALITY, UNSPECIFIED CHRONICITY: ICD-10-CM

## 2019-05-13 PROCEDURE — 99214 PR OFFICE/OUTPT VISIT, EST, LEVL IV, 30-39 MIN: ICD-10-PCS | Mod: S$GLB,,, | Performed by: PHYSICIAN ASSISTANT

## 2019-05-13 PROCEDURE — 93010 EKG 12-LEAD: ICD-10-PCS | Mod: S$GLB,,, | Performed by: STUDENT IN AN ORGANIZED HEALTH CARE EDUCATION/TRAINING PROGRAM

## 2019-05-13 PROCEDURE — 93010 EKG 12-LEAD: ICD-10-PCS | Mod: S$GLB,,, | Performed by: INTERNAL MEDICINE

## 2019-05-13 PROCEDURE — 71046 XR CHEST PA AND LATERAL: ICD-10-PCS | Mod: FY,S$GLB,, | Performed by: RADIOLOGY

## 2019-05-13 PROCEDURE — 93005 ELECTROCARDIOGRAM TRACING: CPT | Mod: S$GLB,,, | Performed by: PHYSICIAN ASSISTANT

## 2019-05-13 PROCEDURE — 71046 X-RAY EXAM CHEST 2 VIEWS: CPT | Mod: FY,S$GLB,, | Performed by: RADIOLOGY

## 2019-05-13 PROCEDURE — 93005 EKG 12-LEAD: ICD-10-PCS | Mod: S$GLB,,, | Performed by: PHYSICIAN ASSISTANT

## 2019-05-13 PROCEDURE — 99214 OFFICE O/P EST MOD 30 MIN: CPT | Mod: S$GLB,,, | Performed by: PHYSICIAN ASSISTANT

## 2019-05-13 PROCEDURE — 3008F PR BODY MASS INDEX (BMI) DOCUMENTED: ICD-10-PCS | Mod: CPTII,S$GLB,, | Performed by: PHYSICIAN ASSISTANT

## 2019-05-13 PROCEDURE — 93010 ELECTROCARDIOGRAM REPORT: CPT | Mod: S$GLB,,, | Performed by: STUDENT IN AN ORGANIZED HEALTH CARE EDUCATION/TRAINING PROGRAM

## 2019-05-13 PROCEDURE — 93010 ELECTROCARDIOGRAM REPORT: CPT | Mod: S$GLB,,, | Performed by: INTERNAL MEDICINE

## 2019-05-13 PROCEDURE — 3008F BODY MASS INDEX DOCD: CPT | Mod: CPTII,S$GLB,, | Performed by: PHYSICIAN ASSISTANT

## 2019-05-13 RX ORDER — TIZANIDINE 2 MG/1
2 TABLET ORAL EVERY 12 HOURS PRN
Qty: 20 TABLET | Refills: 0 | Status: SHIPPED | OUTPATIENT
Start: 2019-05-13 | End: 2019-05-23

## 2019-05-13 RX ORDER — IBUPROFEN 600 MG/1
600 TABLET ORAL EVERY 6 HOURS PRN
Qty: 30 TABLET | Refills: 0 | Status: SHIPPED | OUTPATIENT
Start: 2019-05-13 | End: 2020-05-12

## 2019-05-13 NOTE — PROGRESS NOTES
"Subjective:       Patient ID: Evelin Taylor is a 28 y.o. female.    Vitals:  height is 5' 2" (1.575 m) and weight is 85.7 kg (189 lb). Her temperature is 98.7 °F (37.1 °C). Her blood pressure is 136/92 (abnormal) and her pulse is 74. Her respiration is 16 and oxygen saturation is 100%.     Chief Complaint: Back Pain    Patient reports 2 weeks of chest pain. She states she will have intermittent left-sided chest pain that sharp that feels like something is grabbing her through her back that only last for few seconds and happens multiple times a day.  She states that it does not seem to be related to food intake or exercise.  She states she is not stressful.  She does not have any cardiac history.  She denies any trauma or injury.  She states that it stays the same is not becoming longer in consistency or more frequent.  She denies any numbness tingling or weakness of her arms or legs.  She denies any fever malaise.  She denies any cough or shortness of breath. Denies any current symptoms.     Back Pain   This is a new problem. The current episode started in the past 7 days (2 wks ). The problem occurs daily. The problem is unchanged. Quality: sharp. The pain is at a severity of 8/10. The pain is moderate. The pain is the same all the time. Associated symptoms include chest pain. Pertinent negatives include no abdominal pain, bladder incontinence, bowel incontinence, dysuria, fever, headaches, leg pain, numbness, paresis, paresthesias, pelvic pain, perianal numbness, tingling, weakness or weight loss. She has tried NSAIDs for the symptoms. The treatment provided mild relief.       Constitution: Negative for fatigue and fever.   Cardiovascular: Positive for chest pain.   Respiratory: Positive for chest tightness.    Gastrointestinal: Negative for abdominal pain and bowel incontinence.   Genitourinary: Negative for dysuria, urgency, bladder incontinence, hematuria and pelvic pain.   Musculoskeletal: Positive " for back pain. Negative for muscle cramps and history of spine disorder.   Skin: Negative for rash.   Neurological: Negative for coordination disturbances, headaches, numbness and tingling.       Objective:      Physical Exam   Constitutional: She is oriented to person, place, and time. Vital signs are normal. She appears well-developed and well-nourished. She is active and cooperative.  Non-toxic appearance. She does not appear ill. No distress.   HENT:   Head: Normocephalic and atraumatic.   Right Ear: Hearing, tympanic membrane, external ear and ear canal normal.   Left Ear: Hearing, tympanic membrane, external ear and ear canal normal.   Nose: Nose normal. No mucosal edema, rhinorrhea or nasal deformity. No epistaxis. Right sinus exhibits no maxillary sinus tenderness and no frontal sinus tenderness. Left sinus exhibits no maxillary sinus tenderness and no frontal sinus tenderness.   Mouth/Throat: Uvula is midline, oropharynx is clear and moist and mucous membranes are normal. No trismus in the jaw. Normal dentition. No uvula swelling. No posterior oropharyngeal erythema.   Eyes: Conjunctivae and lids are normal. Right eye exhibits no discharge. Left eye exhibits no discharge. No scleral icterus.   Sclera clear bilat   Neck: Trachea normal, normal range of motion, full passive range of motion without pain and phonation normal. Neck supple.   Cardiovascular: Normal rate, regular rhythm, normal heart sounds, intact distal pulses and normal pulses.   Pulmonary/Chest: Effort normal and breath sounds normal. No accessory muscle usage or stridor. No respiratory distress. She has no decreased breath sounds. She has no wheezes. She has no rhonchi. She has no rales. Chest wall is not dull to percussion. She exhibits no mass, no tenderness, no bony tenderness, no laceration, no crepitus, no edema, no deformity, no swelling and no retraction.   Abdominal: Soft. Normal appearance and bowel sounds are normal. She exhibits no  distension, no abdominal bruit, no pulsatile midline mass and no mass. There is no tenderness.   Musculoskeletal: Normal range of motion. She exhibits no edema or deformity.        Cervical back: She exhibits normal range of motion, no tenderness, no bony tenderness, no swelling, no edema, no deformity, no laceration, no pain, no spasm and normal pulse.        Thoracic back: She exhibits normal range of motion, no tenderness, no bony tenderness, no swelling, no edema, no deformity, no laceration, no pain, no spasm and normal pulse.        Lumbar back: She exhibits normal range of motion, no tenderness, no bony tenderness, no swelling, no edema, no deformity, no laceration, no pain, no spasm and normal pulse.   Neurological: She is alert and oriented to person, place, and time. She has normal strength and normal reflexes. No sensory deficit. She exhibits normal muscle tone. Coordination normal.   Skin: Skin is warm, dry and intact. She is not diaphoretic. No pallor.   Psychiatric: She has a normal mood and affect. Her speech is normal and behavior is normal. Judgment and thought content normal. Cognition and memory are normal.   Nursing note and vitals reviewed.        XRAY chest: No acute cardiopulmonary processes noted    EKG: NSR; bpm 80; no ST changes    Assessment:       1. Chest pain, unspecified type    2. Back pain, unspecified back location, unspecified back pain laterality, unspecified chronicity    3. Muscle spasm        Plan:         Chest pain, unspecified type  -     X-Ray Chest PA And Lateral; Future; Expected date: 05/13/2019  -     EKG 12-lead  -     tiZANidine (ZANAFLEX) 2 MG tablet; Take 1 tablet (2 mg total) by mouth every 12 (twelve) hours as needed (muscle spasms).  Dispense: 20 tablet; Refill: 0  -     ibuprofen (ADVIL,MOTRIN) 600 MG tablet; Take 1 tablet (600 mg total) by mouth every 6 (six) hours as needed.  Dispense: 30 tablet; Refill: 0    Back pain, unspecified back location, unspecified  back pain laterality, unspecified chronicity    Muscle spasm        discussed with patient to make sure she follows up with her primary care physician for further evaluation and follow-up.  Exact etiology of chest pain undetermined.  Discussed with patient the way she describes pain sounds muscle spasms.  Muscle meantime.  ER precautions given patient verbalized understanding.      Uncertain Causes of Chest Pain    Chest pain can happen for a number of reasons. Sometimes the cause can't be determined. If your condition does not seem serious, and your pain does not appear to be coming from your heart, your healthcare provider may recommend watching it closely. Sometimes the signs of a serious problem take more time to appear. Many problems not related to your heart can cause chest pain.These include:  · Musculoskeletal. Costochondritis, an inflammation of the tissues around the ribs that can occur from trauma or overuse injuries  · Respiratory. Pneumonia, pneumothorax, or pneumonitis (inflammation of the lining of the chest and lungs)  · Gastrointestinal. Esophageal reflux, heartburn, or gallbladder disease  · Anxiety and panic disorders  · Nerve compression and neuritis  · Miscellaneous problems such as aortic aneurysm or pulmonary embolism (a blood clot in the lungs)  Home care  After your visit, follow these recommendations:  · Rest today and avoid strenuous activity.  · Take any prescribed medicine as directed.  · Be aware of any recurrent chest pain and notice any changes  Follow-up care  Follow up with your healthcare provider if you do not start to feel better within 24 hours, or as advised.  Call 911  Call 911 if any of these occur:  · A change in the type of pain: if it feels different, becomes more severe, lasts longer, or begins to spread into your shoulder, arm, neck, jaw or back  · Shortness of breath or increased pain with breathing  · Weakness, dizziness, or fainting  · Rapid heart beat  · Crushing  sensation in your chest  When to seek medical advice  Call your healthcare provider right away if any of the following occur:  · Cough with dark colored sputum (phlegm) or blood  · Fever of 100.4ºF (38ºC) or higher, or as directed by your healthcare provider  · Swelling, pain or redness in one leg  · Shortness of breath  Date Last Reviewed: 12/30/2015  © 1696-9442 [employer]. [address] [place] [address], [place] [address]. All rights reserved. This information is not intended as a substitute for professional medical care. Always follow your healthcare professional's instructions.        Muscle Spasm  A muscle spasm is a sudden tightening of the muscle you cant control. This may be caused by strain, overworking the muscle, or injury. It can also be caused by dehydration, electrolyte imbalance, diabetes, alcohol use, and certain medicines. If it goes on long enough the muscle spasm causes pain. Common areas for muscle spasm are the legs, neck, and back.  Home care  · Heat, massage, and stretching will help relax muscle spasm.  · When the spasm is in your arm or leg, stretch the muscle passively. To do this, have someone bend or straighten the joint above or below the muscle until you feel the stretch on the sore muscle. You can stretch the muscle actively by moving the affected body part. This will stretch the muscle that is in spasm. For example, if the spasm is in your calf, bend the ankle so your toes point upward toward your knee. This will stretch your calf muscle.  · You may use over-the-counter pain medicine to control pain, unless another medicine was prescribed. If you have chronic liver or kidney disease or ever had a stomach ulcer or GI bleeding, talk with your healthcare provider before using these medicines.  Follow-up care  Follow up with your healthcare provider, or as advised.    When to seek medical advice  Call your healthcare provider right away if any of the following occur:  · Fingers  or toes become swollen, cold, blue, numb, or tingly  · You develop weakness in the affected arm or leg  · Pain increases and is not controlled by the above measures  Date Last Reviewed: 11/21/2015  © 4088-2250 [employer]. [address] [place] [address]. All rights reserved. This information is not intended as a substitute for professional medical care. Always follow your healthcare professional's instructions.      Please follow up with your Primary care provider within 2-5 days if your signs and symptoms have not resolved or worsen.     If your condition worsens or fails to improve we recommend that you receive another evaluation at the emergency room immediately or contact your primary medical clinic to discuss your concerns.   You must understand that you have received an Urgent Care treatment only and that you may be released before all of your medical problems are known or treated. You, the patient, will arrange for follow up care as instructed.     RED FLAGS/WARNING SYMPTOMS DISCUSSED WITH PATIENT THAT WOULD WARRANT EMERGENT MEDICAL ATTENTION. PATIENT VERBALIZED UNDERSTANDING.     Addendum 5/15/19:  Incorrect any EKG transmitted.  That EKG had and correct lead placement.  Correct EKG was read at time of visit and scanned into chart today.

## 2019-05-14 NOTE — PATIENT INSTRUCTIONS
Uncertain Causes of Chest Pain    Chest pain can happen for a number of reasons. Sometimes the cause can't be determined. If your condition does not seem serious, and your pain does not appear to be coming from your heart, your healthcare provider may recommend watching it closely. Sometimes the signs of a serious problem take more time to appear. Many problems not related to your heart can cause chest pain.These include:  · Musculoskeletal. Costochondritis, an inflammation of the tissues around the ribs that can occur from trauma or overuse injuries  · Respiratory. Pneumonia, pneumothorax, or pneumonitis (inflammation of the lining of the chest and lungs)  · Gastrointestinal. Esophageal reflux, heartburn, or gallbladder disease  · Anxiety and panic disorders  · Nerve compression and neuritis  · Miscellaneous problems such as aortic aneurysm or pulmonary embolism (a blood clot in the lungs)  Home care  After your visit, follow these recommendations:  · Rest today and avoid strenuous activity.  · Take any prescribed medicine as directed.  · Be aware of any recurrent chest pain and notice any changes  Follow-up care  Follow up with your healthcare provider if you do not start to feel better within 24 hours, or as advised.  Call 911  Call 911 if any of these occur:  · A change in the type of pain: if it feels different, becomes more severe, lasts longer, or begins to spread into your shoulder, arm, neck, jaw or back  · Shortness of breath or increased pain with breathing  · Weakness, dizziness, or fainting  · Rapid heart beat  · Crushing sensation in your chest  When to seek medical advice  Call your healthcare provider right away if any of the following occur:  · Cough with dark colored sputum (phlegm) or blood  · Fever of 100.4ºF (38ºC) or higher, or as directed by your healthcare provider  · Swelling, pain or redness in one leg  · Shortness of breath  Date Last Reviewed: 12/30/2015  © 1786-2985 [facility]  [employer]. [address] [place] [address]. All rights reserved. This information is not intended as a substitute for professional medical care. Always follow your healthcare professional's instructions.        Muscle Spasm  A muscle spasm is a sudden tightening of the muscle you cant control. This may be caused by strain, overworking the muscle, or injury. It can also be caused by dehydration, electrolyte imbalance, diabetes, alcohol use, and certain medicines. If it goes on long enough the muscle spasm causes pain. Common areas for muscle spasm are the legs, neck, and back.  Home care  · Heat, massage, and stretching will help relax muscle spasm.  · When the spasm is in your arm or leg, stretch the muscle passively. To do this, have someone bend or straighten the joint above or below the muscle until you feel the stretch on the sore muscle. You can stretch the muscle actively by moving the affected body part. This will stretch the muscle that is in spasm. For example, if the spasm is in your calf, bend the ankle so your toes point upward toward your knee. This will stretch your calf muscle.  · You may use over-the-counter pain medicine to control pain, unless another medicine was prescribed. If you have chronic liver or kidney disease or ever had a stomach ulcer or GI bleeding, talk with your healthcare provider before using these medicines.  Follow-up care  Follow up with your healthcare provider, or as advised.    When to seek medical advice  Call your healthcare provider right away if any of the following occur:  · Fingers or toes become swollen, cold, blue, numb, or tingly  · You develop weakness in the affected arm or leg  · Pain increases and is not controlled by the above measures  Date Last Reviewed: 11/21/2015  © 1934-1965 [employer]. [address] [place] [address]. All rights reserved. This information is not intended as a substitute for professional medical care.  Always follow your healthcare professional's instructions.      Please follow up with your Primary care provider within 2-5 days if your signs and symptoms have not resolved or worsen.     If your condition worsens or fails to improve we recommend that you receive another evaluation at the emergency room immediately or contact your primary medical clinic to discuss your concerns.   You must understand that you have received an Urgent Care treatment only and that you may be released before all of your medical problems are known or treated. You, the patient, will arrange for follow up care as instructed.     RED FLAGS/WARNING SYMPTOMS DISCUSSED WITH PATIENT THAT WOULD WARRANT EMERGENT MEDICAL ATTENTION. PATIENT VERBALIZED UNDERSTANDING.

## 2019-05-15 ENCOUNTER — TELEPHONE (OUTPATIENT)
Dept: URGENT CARE | Facility: CLINIC | Age: 29
End: 2019-05-15

## 2019-05-15 NOTE — TELEPHONE ENCOUNTER
Left voicemail for patient to return call.  Want to ensure patient has scheduled follow-up primary care for follow-up evaluation and cardiac stress test as discussed at visit.

## 2019-07-14 ENCOUNTER — OFFICE VISIT (OUTPATIENT)
Dept: URGENT CARE | Facility: CLINIC | Age: 29
End: 2019-07-14
Payer: COMMERCIAL

## 2019-07-14 VITALS
BODY MASS INDEX: 34.78 KG/M2 | HEART RATE: 74 BPM | OXYGEN SATURATION: 100 % | SYSTOLIC BLOOD PRESSURE: 124 MMHG | WEIGHT: 189 LBS | RESPIRATION RATE: 18 BRPM | HEIGHT: 62 IN | DIASTOLIC BLOOD PRESSURE: 80 MMHG

## 2019-07-14 DIAGNOSIS — M79.601 PAIN IN RIGHT ARM: Primary | ICD-10-CM

## 2019-07-14 DIAGNOSIS — R22.0 FACIAL SWELLING: ICD-10-CM
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
## 2019-07-14 PROCEDURE — 99213 OFFICE O/P EST LOW 20 MIN: CPT | Mod: S$GLB,,, | Performed by: FAMILY MEDICINE

## 2019-07-14 PROCEDURE — 3008F BODY MASS INDEX DOCD: CPT | Mod: CPTII,S$GLB,, | Performed by: FAMILY MEDICINE

## 2019-07-14 PROCEDURE — 3008F PR BODY MASS INDEX (BMI) DOCUMENTED: ICD-10-PCS | Mod: CPTII,S$GLB,, | Performed by: FAMILY MEDICINE

## 2019-07-14 PROCEDURE — 99213 PR OFFICE/OUTPT VISIT, EST, LEVL III, 20-29 MIN: ICD-10-PCS | Mod: S$GLB,,, | Performed by: FAMILY MEDICINE

## 2019-07-14 RX ORDER — CYCLOBENZAPRINE HCL 10 MG
10 TABLET ORAL 3 TIMES DAILY PRN
Qty: 30 TABLET | Refills: 0 | Status: SHIPPED | OUTPATIENT
Start: 2019-07-14 | End: 2019-07-24

## 2019-07-14 NOTE — PATIENT INSTRUCTIONS
Reducing Risk of Musculoskeletal Disorders (MSDs): Posture  Standing, sitting, and moving incorrectly all increase your risk of musculoskeletal disorders (MSDs). Why? Because posture problems overwork your body. They strain your muscles and tendons and stress your joints. With a little adjustment, however, you can correct most posture problems. Whatever you do, try to stay in a near neutral position and to work within easy reach. Tasks take less force when you work from a stable base. Take your knowledge of ergonomic principles home with you.    Stay near neutral  Whether you're standing or sitting, neutral posture places the least amount of stress on your body. To find neutral, line up your ears, shoulders, and hips. Keep your head upright and relax while you do this. If you're holding your breath or your shoulders are creeping toward your ears, try again. Your shoulders should be level, with your arms relaxed at your sides. You can rest your body by returning to neutral as often as possible. Other helpful positions include:  · Keep your hands, wrists, and forearms straight and parallel to the floor.  · Keep your head level, facing forward, and in line with your torso.  · Your feet should be supported by the floor, and your thighs and hips supported by a padded seat.  · If you are sitting, it is important to have your lower back supported.  Work within reach  Keep your work within 14 to 18 inches of your body, depending on your size. Reaching too far can be awkward. It also reduces your muscle power, so you need to use more force. Never lock a joint by extending it until it can't go any farther. Also, avoid reaching overhead or behind your back, if you can. If you can't, return to neutral as soon as possible.  Support your body off the job  Have you thought about your posture while you clean house or watch TV? Anytime you're not using a neutral posture, you might be straining muscles or joints. Just sitting on a  sagging sofa every evening may be enough to strain your back. Remember these tips:  · When relaxing, support your body so you're comfortable and not twisted. On a couch or chair, put a rolled-up pillow behind your back to support it.  · No matter what you're doing (cooking, cleaning, carpentry), work within reach.  · An old, sagging, lumpy mattress can be doing your body harm. A comfortable mattress that's firm but has enough cushion to support your body's natural curves provides better rest and opportunity for your body to recover the demands of the day.  Date Last Reviewed: 12/31/2015  © 8426-7930 [employer]. [address] [place] [address], [place] [address]. All rights reserved. This information is not intended as a substitute for professional medical care. Always follow your healthcare professional's instructions.

## 2019-07-14 NOTE — LETTER
July 14, 2019      [facility] [place] [address]  Phone: [phone]  Fax: [phone]       Patient: Evelin Taylor   YOB: 1990  Date of Visit: 07/14/2019    To Whom It May Concern:    Chacho Taylor  was at [facility] on 07/14/2019. She may return to work/school on 7/16/19  with no restrictions. If you have any questions or concerns, or if I can be of further assistance, please do not hesitate to contact me.    Sincerely,    Alice Plata MD
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
## 2019-07-17 ENCOUNTER — TELEPHONE (OUTPATIENT)
Dept: URGENT CARE | Facility: CLINIC | Age: 29
End: 2019-07-17

## 2019-10-09 ENCOUNTER — OFFICE VISIT (OUTPATIENT)
Dept: FAMILY MEDICINE | Facility: HOSPITAL | Age: 29
End: 2019-10-09

## 2019-10-09 VITALS
WEIGHT: 206.13 LBS | HEART RATE: 69 BPM | HEIGHT: 62 IN | SYSTOLIC BLOOD PRESSURE: 108 MMHG | BODY MASS INDEX: 37.93 KG/M2 | DIASTOLIC BLOOD PRESSURE: 75 MMHG

## 2019-10-09 DIAGNOSIS — R19.8 UMBILICUS DISCHARGE: Primary | ICD-10-CM

## 2019-10-09 PROCEDURE — 99213 OFFICE O/P EST LOW 20 MIN: CPT | Performed by: STUDENT IN AN ORGANIZED HEALTH CARE EDUCATION/TRAINING PROGRAM

## 2019-10-09 RX ORDER — MUPIROCIN 20 MG/G
OINTMENT TOPICAL 2 TIMES DAILY
Qty: 30 G | Refills: 1 | Status: SHIPPED | OUTPATIENT
Start: 2019-10-09 | End: 2020-08-11
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
## 2020-03-24 ENCOUNTER — TELEPHONE (OUTPATIENT)
Dept: FAMILY MEDICINE | Facility: HOSPITAL | Age: 30
End: 2020-03-24

## 2020-03-24 NOTE — TELEPHONE ENCOUNTER
----- Message from Ana Hood MA sent at 3/24/2020  9:02 AM CDT -----  Contact: Patient [phone]  Patient experiencing chest discomfort in middle of her chest for last few weeks.  Went to urgent care yesterday but could not be seen due to no insurance.  Patient requests return call to discuss.  Thanks.

## 2020-04-08 ENCOUNTER — HOSPITAL ENCOUNTER (EMERGENCY)
Facility: HOSPITAL | Age: 30
Discharge: HOME | End: 2020-04-08
Attending: EMERGENCY MEDICINE
Payer: OTHER GOVERNMENT

## 2020-04-08 VITALS
SYSTOLIC BLOOD PRESSURE: 132 MMHG | WEIGHT: 185 LBS | OXYGEN SATURATION: 100 % | BODY MASS INDEX: 34.04 KG/M2 | HEIGHT: 62 IN | TEMPERATURE: 98 F | RESPIRATION RATE: 18 BRPM | DIASTOLIC BLOOD PRESSURE: 83 MMHG | HEART RATE: 76 BPM

## 2020-04-08 DIAGNOSIS — R05.9 COUGH: Primary | ICD-10-CM

## 2020-04-08 LAB — SARS-COV-2 RDRP RESP QL NAA+PROBE: NEGATIVE

## 2020-04-08 PROCEDURE — 99283 EMERGENCY DEPT VISIT LOW MDM: CPT

## 2020-04-08 PROCEDURE — U0002 COVID-19 LAB TEST NON-CDC: HCPCS

## 2020-04-08 RX ORDER — GUAIFENESIN, PSEUDOEPHEDRINE HYDROCHLORIDE 600; 60 MG/1; MG/1
1 TABLET, EXTENDED RELEASE ORAL 2 TIMES DAILY
Qty: 20 TABLET | Refills: 0 | Status: SHIPPED | OUTPATIENT
Start: 2020-04-08 | End: 2020-04-18

## 2020-04-08 NOTE — ED TRIAGE NOTES
Patient presents to the ED with reports of having shortness of breath with cough. States symptoms started x 2 days ago. Denies any fever, nausea, vomiting, diarrhea, or abdominal pain.     Review of patient's allergies indicates:   Allergen Reactions    Penicillins         Patient has verified the spelling of their name and  on armband.   APPEARANCE: Patient is alert, calm, oriented x 4, and does not appear distressed.  SKIN: Skin is normal for race, warm, and dry. Normal skin turgor and mucous membranes moist.  CARDIAC: Normal rate and no murmur heard.   RESPIRATORY:Normal rate and effort. Breath sounds clear bilaterally throughout chest. Respirations are equal and unlabored. +Short of breath. +Cough.   GASTRO: Bowel sounds normal, abdomen is soft, no tenderness, and no abdominal distention.  MUSCLE: Full ROM. No bony tenderness or soft tissue tenderness. No obvious deformity.  ENT: EARS: no obvious drainage. NOSE: no active bleeding. THROAT: no redness or swelling.

## 2020-04-08 NOTE — ED PROVIDER NOTES
Encounter Date: 4/8/2020    SCRIBE #1 NOTE: I, Billy Au, am scribing for, and in the presence of,  . I have scribed the entire note.       History     Chief Complaint   Patient presents with    Cough     Pt here with reports of SOB with activity and dry cough x 2 days. Pt reports she works at [place] and has possibly in contact with someone sick. Pt denies any fever.    Shortness of Breath     Evelin Taylor is a 29 y.o. female who  has no past medical history on file.    The patient presents to the ED due to SOB and dry cough that has been worsening since 2 days ago. Patient notes that the SOB and cough gets worse with exertion. She is also experiencing associated CP and trouble breathing. She denies any fever, vomiting, diarrhea or any other pertinent symptoms at this time. Patient notes that she [place] and believes she has come into contact with possible positive covid-19 persons.             The history is provided by the patient.     Review of patient's allergies indicates:   Allergen Reactions    Penicillins      No past medical history on file.  Past Surgical History:   Procedure Laterality Date    LYMPH NODE BIOPSY       Family History   Problem Relation Age of Onset    No Known Problems Father     No Known Problems Sister     No Known Problems Brother      Social History     Tobacco Use    Smoking status: Never Smoker   Substance Use Topics    Alcohol use: No    Drug use: No     Review of Systems   Constitutional: Negative for activity change, appetite change, chills, diaphoresis, fatigue and fever.   HENT: Negative for sore throat.    Respiratory: Positive for cough and shortness of breath. Negative for chest tightness.    Cardiovascular: Positive for chest pain (Chest wall pain). Negative for palpitations.   Gastrointestinal: Negative for abdominal distention, abdominal pain, diarrhea, nausea and vomiting.   Endocrine: Negative for polydipsia and polyphagia.    Genitourinary: Negative for difficulty urinating, dysuria and flank pain.   Musculoskeletal: Negative for arthralgias.   Skin: Negative for pallor and rash.   Neurological: Negative for dizziness and headaches.   Psychiatric/Behavioral: Negative for confusion.       Physical Exam     Initial Vitals [04/08/20 0147]   BP Pulse Resp Temp SpO2   132/83 76 18 98.3 °F (36.8 °C) 100 %      MAP       --         Physical Exam    Nursing note and vitals reviewed.  Constitutional: She appears well-developed and well-nourished. She is not diaphoretic. No distress.   HENT:   Head: Normocephalic and atraumatic.   Mouth/Throat: Oropharynx is clear and moist.   Eyes: EOM are normal. Pupils are equal, round, and reactive to light.   Neck: No tracheal deviation present.   Cardiovascular: Normal rate, regular rhythm, normal heart sounds and intact distal pulses.   Pulmonary/Chest: Breath sounds normal. No stridor. No respiratory distress. She has no wheezes.   Abdominal: Soft. Bowel sounds are normal. She exhibits no distension and no mass. There is no tenderness.   Musculoskeletal: Normal range of motion. She exhibits no edema.   Neurological: She is alert and oriented to person, place, and time. She has normal strength. No cranial nerve deficit or sensory deficit.   Skin: Skin is warm and dry. Capillary refill takes less than 2 seconds. No pallor.   Psychiatric: She has a normal mood and affect. Her behavior is normal. Thought content normal.         ED Course   Procedures  Labs Reviewed   SARS-COV-2 RNA AMPLIFICATION, QUAL          Imaging Results    None          Medical Decision Making:   Initial Assessment:   Evelin Taylor is a 29 y.o. female who  has no past medical history on file. The patient presents to the ED due to SOB and dry cough that has been worsening since 2 days ago.  Differential Diagnosis:   URI, viral syndrome, corona virus  Clinical Tests:   Lab Tests: Reviewed and Ordered                   ED Course  as of Apr 09 0246 Wed Apr 08, 2020   0241 Pt very well appearing lab negative will DC home with follow-up instructions and return precautions     [DC]      ED Course User Index  [DC] Agusto Bernal Jr., MD                Clinical Impression:       ICD-10-CM ICD-9-CM   1. Cough R05 786.2             ED Disposition Condition    Discharge Stable        ED Prescriptions     Medication Sig Dispense Start Date End Date Auth. Provider    pseudoephedrine-guaifenesin  mg (MUCINEX D)  mg per tablet Take 1 tablet by mouth 2 (two) times daily. for 10 days 20 tablet 4/8/2020 4/18/2020 Agusto Bernal Jr., MD        Follow-up Information     Follow up With Specialties Details Why Contact Info    Holly Gutierrez MD Family Medicine   [address] [facility] [address]  [facility] [id number] [phone]      [facility]  If symptoms worsen [address] [facility] [address] [phone]                        I, Agusto Bernal,  personally performed the services described in this documentation. All medical record entries made by the scribe were at my direction and in my presence.  I have reviewed the chart and agree that the record reflects my personal performance and is accurate and complete. Agusto Bernal Jr., MD  04/09/20 0246

## 2020-04-10 ENCOUNTER — E-VISIT (OUTPATIENT)
Dept: FAMILY MEDICINE | Facility: HOSPITAL | Age: 30
End: 2020-04-10

## 2020-04-10 DIAGNOSIS — Z20.822 SUSPECTED COVID-19 VIRUS INFECTION: Primary | ICD-10-CM

## 2020-04-10 NOTE — PROGRESS NOTES
*Due to COVID-19, we are limiting non-urgent clinic visits. This is a telephone encounter that is replacing a normal clinic visit. Pt unable to participate in video call due to lack of available technology. I will address patient's concerns to the best of my ability via telephone. If I feel a clinic visit is necessary, proper arrangements will be made to do so.     Chief Complaint: suspected coronavirus    S: Evelin Taylor is a 29 y.o. female who attends televisit with concerns regarding coronavirus. Pt visited the ED the night before and tested negative for covid. However, ED physician was suspicious that patient did have covid and instructed pt to follow up with PCP for further guidance. Pt symptoms include cough, but no fever, chills, n/v. Pt advised to remain quarantine and avoid work until she has remained symptom free for 3 days.     Review of Systems [Negative unless checked off]    General ROS: []fever, []chills, []weight loss, []malaise/fatigue.  ENT ROS: []congestion, []rhinorrhea,  []sore throat, []neck pain, []hearing loss.  Respiratory ROS: [x]cough, []pleuritic chest pain, []shortness of breath, []wheezing.  CVS ROS:[]chest pain, []dyspnea on exertion, []palpitations, []orthopnea, []leg swelling, []PND.   GI ROS: []nausea, []vomiting, [] epigastric pain, []abd pain, []diarrhea, []constipation, []blood/melena in stool.   MSK ROS: []myalgias, []joint pain, []muscular weakness,  []back pain, [] falls.   Neurological:[]dizziness,[]numbness,[]loss of consciousness, []weakness  []headaches.   Psych ROS: []hallucinations, []depression, []anxiety, []suicidal ideation.    A/P:    1. Suspected Covid-19 Virus Infection  Work excuse note provided for patient.     Medication List with Changes/Refills   Current Medications    IBUPROFEN (ADVIL,MOTRIN) 600 MG TABLET    Take 1 tablet (600 mg total) by mouth every 6 (six) hours as needed.    MUPIROCIN (BACTROBAN) 2 % OINTMENT    Apply topically 2 (two) times  daily.    PSEUDOEPHEDRINE-GUAIFENESIN  MG (MUCINEX D)  MG PER TABLET    Take 1 tablet by mouth 2 (two) times daily. for 10 days       Follow up in about 3 months (around 7/10/2020), or if symptoms worsen or fail to improve.    Patient instructed to contact the clinic at [phone] with any additional questions or concerns.    Dakotah Rene MD PGY-3  [facility]     This note was partially created using [employer] Voice Recognition software. There may be occasional misinterpreted words, typos, or grammatical errors that were not caught upon review.     The patient location is: Home  The chief complaint leading to consultation is: suspected covid  Visit type: Virtual visit with synchronous audio  Total time spent with patient: 6 minutes  Each patient to whom he or she provides medical services by telemedicine is:  (1) informed of the relationship between the physician and patient and the respective role of any other health care provider with respect to management of the patient; and (2) notified that he or she may decline to receive medical services by telemedicine and may withdraw from such care at any time.

## 2020-04-20 ENCOUNTER — TELEPHONE (OUTPATIENT)
Dept: FAMILY MEDICINE | Facility: HOSPITAL | Age: 30
End: 2020-04-20

## 2020-04-20 ENCOUNTER — E-VISIT (OUTPATIENT)
Dept: FAMILY MEDICINE | Facility: HOSPITAL | Age: 30
End: 2020-04-20

## 2020-04-20 NOTE — PROGRESS NOTES
Subjective:       Patient ID: Evelin Taylor is a 29 y.o. female.    Chief Complaint: Suspected COVID-19 Infection     HPI   29-year-old female with past medical history of suspected COVID -19 infection, who presents to clinic via telemedicine a follow-up.Patient reports first having symptoms April 8th.  Patient did have a negative PCR test on the day her symptoms began. Patient reported having SOB, sore throat, dry cough at that time.Patient reported that she had cessation of  symptoms 4 days ago[place]  Requires note for return to work.    Review of Systems   Constitutional: Negative for activity change and appetite change.   HENT: Negative for trouble swallowing.    Eyes: Negative for visual disturbance.   Respiratory: Negative for shortness of breath.    Cardiovascular: Negative for chest pain and palpitations.   Gastrointestinal: Negative for abdominal distention, diarrhea, nausea and vomiting.   Endocrine: Negative for cold intolerance and heat intolerance.   Genitourinary: Negative for flank pain.   Musculoskeletal: Negative for arthralgias.   Skin: Negative for color change.   Allergic/Immunologic: Negative for immunocompromised state.   Neurological: Negative for headaches.   Hematological: Negative for adenopathy.   Psychiatric/Behavioral: Negative for agitation.       Objective:      There were no vitals filed for this visit.  Physical Exam    No acute distress.  Alert oriented x4.  Able to speak in full sentences without difficulty.  Mood stable.  Assessment:       1. Suspected Covid-19 Virus Infection        Plan:       Suspected Covid-19 Virus Infection  Patient presented with symptoms of Dry cough, SOB and sore throat on 4/8/20   Employed at [employer] currently and patient has given verbal permission to release information to employer at designed fax number to be provided.     Patient meets return to work criteria:     At least seven days have passed since symptoms  first appeared AND  At least three days (72 hours) have passed since recovery of symptoms (defined as resolution of fever without the use of fever-reducingmedications and improvement in respiratory symptoms [eg, cough, shortness of breath])  *Reference Uptodate*      Return to clinic if symptoms fail to improve    The patient location is: Home  The chief complaint leading to consultation is: COVID-19   Visit type: audio only  Total time spent with patient: 5 min   Each patient to whom he or she provides medical services by telemedicine is:  (1) informed of the relationship between the physician and patient and the respective role of any other health care provider with respect to management of the patient; and (2) notified that he or she may decline to receive medical services by telemedicine and may withdraw from such care at any time.    Quan Castillo MD   [facility], PGY-2

## 2020-04-21 ENCOUNTER — TELEPHONE (OUTPATIENT)
Dept: FAMILY MEDICINE | Facility: HOSPITAL | Age: 30
End: 2020-04-21

## 2020-04-21 NOTE — TELEPHONE ENCOUNTER
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  Please call patient.  Thanks.

## 2020-08-11 ENCOUNTER — OFFICE VISIT (OUTPATIENT)
Dept: FAMILY MEDICINE | Facility: HOSPITAL | Age: 30
End: 2020-08-11
Payer: COMMERCIAL

## 2020-08-11 VITALS
SYSTOLIC BLOOD PRESSURE: 122 MMHG | HEIGHT: 62 IN | BODY MASS INDEX: 39.56 KG/M2 | WEIGHT: 214.94 LBS | DIASTOLIC BLOOD PRESSURE: 76 MMHG | HEART RATE: 93 BPM

## 2020-08-11 DIAGNOSIS — Z01.419 WELL WOMAN EXAM WITH ROUTINE GYNECOLOGICAL EXAM: Primary | ICD-10-CM

## 2020-08-11 PROCEDURE — 99213 OFFICE O/P EST LOW 20 MIN: CPT | Performed by: STUDENT IN AN ORGANIZED HEALTH CARE EDUCATION/TRAINING PROGRAM

## 2020-08-12 NOTE — PROGRESS NOTES
Subjective                                                                                                                                                                           Chief Complaint: Well Woman    History of Present Illness  Evelin Taylor is a 29 y.o. female who  has no past medical history on file. The patient presents to clinic for Well Woman    Patient is doing well. She is sexually active with 1 male partner for 2 years (always male partners) and is not using condoms or birth control. She has been told that s/p myomectomy for fibroids she cannot get pregnant, but she would welcome pregnancy if it happens. No discharge or pain or bleeding. She has a period every month, but it does vary on the day. Normal amount of bleeding that has been gradually increasing since her surgery. No fatigue, SOB, or changes in vision.       activities of daily living Patient is able to complete all daily hygiene, cooking, cleaning, and walking without difficulty or aids.      Healthcare Maintenance:   Health Maintenance   Topic Date Due    Hepatitis C Screening  1989    Lipid Panel  1989    TETANUS VACCINE  12/21/2007       Review of Systems   Constitutional: Negative for activity change, appetite change, fatigue and fever.   HENT: Negative for hearing loss and trouble swallowing.    Eyes: Negative for visual disturbance.   Respiratory: Negative for cough and shortness of breath.    Cardiovascular: Negative for chest pain and leg swelling.   Gastrointestinal: Negative for abdominal pain, constipation, diarrhea, nausea and vomiting.   Genitourinary: Negative for difficulty urinating, menstrual problem, vaginal bleeding, vaginal discharge and vaginal pain.   Musculoskeletal: Negative for arthralgias and myalgias.   Neurological: Negative for speech difficulty, numbness and headaches.   Psychiatric/Behavioral: Negative for dysphoric mood and sleep disturbance. The patient is not nervous/anxious.   "       History reviewed. No pertinent past medical history.    Past Surgical History:   Procedure Laterality Date    LYMPH NODE BIOPSY         Family History   Problem Relation Age of Onset    No Known Problems Father     No Known Problems Sister     No Known Problems Brother        Review of patient's allergies indicates:   Allergen Reactions    Penicillins        No current outpatient medications on file.     Social History     Tobacco Use    Smoking status: Never Smoker   Substance Use Topics    Alcohol use: No    Drug use: No       The ASCVD Risk score ([place] OBDULIA Mike, et al., 2013) failed to calculate for the following reasons:    The 2013 ASCVD risk score is only valid for ages 40 to 79      Objective                                                                                                                                                                             Vitals:    08/11/20 1446   BP: 122/76   Pulse: 93   Weight: 97.5 kg (214 lb 15.2 oz)   Height: 5' 2" (1.575 m)      Body mass index is 39.31 kg/m².    Physical Exam  Vitals signs reviewed. Exam conducted with a chaperone present.   Constitutional:       Appearance: Normal appearance. She is well-developed.   HENT:      Head: Normocephalic and atraumatic.   Eyes:      Conjunctiva/sclera: Conjunctivae normal.      Pupils: Pupils are equal, round, and reactive to light.   Neck:      Musculoskeletal: Normal range of motion and neck supple.      Thyroid: No thyromegaly.   Cardiovascular:      Rate and Rhythm: Normal rate and regular rhythm.      Heart sounds: Normal heart sounds.   Pulmonary:      Effort: Pulmonary effort is normal.      Breath sounds: Normal breath sounds.   Abdominal:      General: Bowel sounds are normal.      Palpations: Abdomen is soft.   Genitourinary:     General: Normal vulva.      Exam position: Supine.      Pubic Area: No rash.       Labia:         Right: No rash, tenderness, lesion or injury.         Left: No rash, " tenderness, lesion or injury.       Vagina: Normal.      Cervix: Normal.      Uterus: Normal.       Adnexa: Right adnexa normal and left adnexa normal.   Musculoskeletal: Normal range of motion.   Lymphadenopathy:      Cervical: No cervical adenopathy.   Skin:     General: Skin is warm and dry.      Capillary Refill: Capillary refill takes less than 2 seconds.   Neurological:      Mental Status: She is alert and oriented to person, place, and time.   Psychiatric:         Behavior: Behavior normal.         Thought Content: Thought content normal.         Judgment: Judgment normal.            Assessment/Plan                                                                                                                                                                Evelin Taylor is a 29 y.o. female who presents to clinic with:    1. Well woman exam with routine gynecological exam         Evelin was seen today for gynecologic exam.    Diagnoses and all orders for this visit:    Well woman exam with routine gynecological exam  -Routine exam with pelvic and pap completed. Discussed diet, exercise, and family planning with patient.       Medication List with Changes/Refills   Discontinued Medications    MUPIROCIN (BACTROBAN) 2 % OINTMENT    Apply topically 2 (two) times daily.       Patient counseled about the importance of healthy dietary habits as well as routine physical activity and exercise for better health outcomes.    The patient's diagnosis and medications were discussed. Proper use of medications was dicussed. I also discussed the importance of close follow up to discuss labs, change or modify medications if needed, monitor side effects, and further evaluation of medical problems. I also discussed the importance of cancer screening.     No follow-ups on file. for further workup and reassessment if labs and tests obtained are stable or sooner as needed    Understanding expressed after counseling  regarding diagnosis and recommendations.      Holly Gutierrez MD  PGY-3  [facility]

## 2020-08-18 ENCOUNTER — TELEPHONE (OUTPATIENT)
Dept: FAMILY MEDICINE | Facility: HOSPITAL | Age: 30
End: 2020-08-18

## 2020-08-18 NOTE — TELEPHONE ENCOUNTER
----- Message from Taylor Trujillo sent at 8/18/2020  9:56 AM CDT -----  Pt calling in for PAP test results. Please call pt back at [phone]

## 2020-08-18 NOTE — PROGRESS NOTES
I was present in clinic during the visit and assume the primary medical care of this patient.  I discussed the case with Dr. Gutierrez, and I have reviewed and agree with the assessment and plan.

## 2020-08-21 ENCOUNTER — TELEPHONE (OUTPATIENT)
Dept: FAMILY MEDICINE | Facility: HOSPITAL | Age: 30
End: 2020-08-21

## 2020-08-21 NOTE — TELEPHONE ENCOUNTER
----- Message from Daysi Dillon sent at 8/20/2020  3:35 PM CDT -----  PT CALL FOR PAP RESULTS CAN DR MARQUEZ PLEASE GIVE HER A CALL [phone]

## 2020-08-22 NOTE — TELEPHONE ENCOUNTER
Called and discussed results with patient. She has ASC-US, but is HPV negative. Instead of a 5 year repeat pap, will repeat pap in 3 years.     Holly Gutierrez MD  PGY-3  [facility]  08/22/2020

## 2020-12-10 ENCOUNTER — OFFICE VISIT (OUTPATIENT)
Dept: FAMILY MEDICINE | Facility: HOSPITAL | Age: 30
End: 2020-12-10
Payer: COMMERCIAL

## 2020-12-10 VITALS
BODY MASS INDEX: 39.63 KG/M2 | SYSTOLIC BLOOD PRESSURE: 116 MMHG | DIASTOLIC BLOOD PRESSURE: 73 MMHG | HEIGHT: 62 IN | HEART RATE: 73 BPM | WEIGHT: 215.38 LBS

## 2020-12-10 DIAGNOSIS — L70.0 ACNE VULGARIS: Primary | ICD-10-CM

## 2020-12-10 PROCEDURE — 99213 OFFICE O/P EST LOW 20 MIN: CPT | Performed by: STUDENT IN AN ORGANIZED HEALTH CARE EDUCATION/TRAINING PROGRAM

## 2020-12-11 NOTE — PROGRESS NOTES
Subjective:       Patient ID: Evelin Taylor is a 29 y.o. female.    Chief Complaint: Acne    Patient is a 28yo F with no PMHx presents to the clinic for acne. She has noticed increased acne along her chin since wearing masks everyday. She does not wear makeup. She does not have a history of acne. She has been using hand soap on her face without relief.     Review of Systems   Constitutional: Negative for fever.   Skin:        Acne on chin   Hematological: Negative for adenopathy.       Objective:      Vitals:    12/10/20 1037   BP: 116/73   Pulse: 73     Physical Exam  Vitals signs reviewed.   Constitutional:       Appearance: Normal appearance.   Skin:     Capillary Refill: Capillary refill takes less than 2 seconds.      Comments: Closed comedones noted to right chin, not actively draining. No surrounding erythema.    Neurological:      Mental Status: She is alert and oriented to person, place, and time.   Psychiatric:         Mood and Affect: Mood normal.         Behavior: Behavior normal.         Thought Content: Thought content normal.         Judgment: Judgment normal.         Assessment:       1. Acne vulgaris        Plan:       Acne vulgaris  -Discussed use of benzoyl peroxide or salicylic acid washes to help with acne. Patient to wash sheets and mask more regularly. Discussed routine skin care. Patient to return if acne does not improve.     Follow up if symptoms worsen or fail to improve.

## 2021-02-12 ENCOUNTER — OFFICE VISIT (OUTPATIENT)
Dept: FAMILY MEDICINE | Facility: HOSPITAL | Age: 31
End: 2021-02-12
Attending: FAMILY MEDICINE
Payer: COMMERCIAL

## 2021-02-12 VITALS
DIASTOLIC BLOOD PRESSURE: 92 MMHG | BODY MASS INDEX: 40 KG/M2 | WEIGHT: 217.38 LBS | HEART RATE: 82 BPM | HEIGHT: 62 IN | SYSTOLIC BLOOD PRESSURE: 129 MMHG

## 2021-02-12 DIAGNOSIS — G43.909 MIGRAINE WITHOUT STATUS MIGRAINOSUS, NOT INTRACTABLE, UNSPECIFIED MIGRAINE TYPE: Primary | ICD-10-CM

## 2021-02-12 PROCEDURE — 99213 OFFICE O/P EST LOW 20 MIN: CPT | Performed by: STUDENT IN AN ORGANIZED HEALTH CARE EDUCATION/TRAINING PROGRAM

## 2021-02-12 RX ORDER — NAPROXEN 500 MG/1
500 TABLET ORAL 2 TIMES DAILY PRN
Qty: 20 TABLET | Refills: 0 | Status: SHIPPED | OUTPATIENT
Start: 2021-02-12 | End: 2022-06-29

## 2021-02-12 RX ORDER — SUMATRIPTAN SUCCINATE 100 MG/1
100 TABLET ORAL ONCE
Qty: 2 TABLET | Refills: 0 | Status: SHIPPED | OUTPATIENT
Start: 2021-02-12 | End: 2022-06-29

## 2021-03-05 ENCOUNTER — OFFICE VISIT (OUTPATIENT)
Dept: FAMILY MEDICINE | Facility: HOSPITAL | Age: 31
End: 2021-03-05
Attending: FAMILY MEDICINE
Payer: COMMERCIAL

## 2021-03-05 VITALS
BODY MASS INDEX: 40.61 KG/M2 | HEIGHT: 62 IN | WEIGHT: 220.69 LBS | DIASTOLIC BLOOD PRESSURE: 80 MMHG | SYSTOLIC BLOOD PRESSURE: 121 MMHG | HEART RATE: 84 BPM

## 2021-03-05 DIAGNOSIS — N89.8 VAGINAL DISCHARGE: Primary | ICD-10-CM

## 2021-03-05 PROCEDURE — 99213 OFFICE O/P EST LOW 20 MIN: CPT | Performed by: STUDENT IN AN ORGANIZED HEALTH CARE EDUCATION/TRAINING PROGRAM

## 2021-03-25 ENCOUNTER — IMMUNIZATION (OUTPATIENT)
Dept: PRIMARY CARE CLINIC | Facility: CLINIC | Age: 31
End: 2021-03-25
Payer: COMMERCIAL

## 2021-03-25 DIAGNOSIS — Z23 NEED FOR VACCINATION: Primary | ICD-10-CM

## 2021-03-25 PROCEDURE — 91300 COVID-19, MRNA, LNP-S, PF, 30 MCG/0.3 ML DOSE VACCINE: ICD-10-PCS | Mod: S$GLB,,, | Performed by: INTERNAL MEDICINE

## 2021-03-25 PROCEDURE — 91300 COVID-19, MRNA, LNP-S, PF, 30 MCG/0.3 ML DOSE VACCINE: CPT | Mod: S$GLB,,, | Performed by: INTERNAL MEDICINE

## 2021-03-25 PROCEDURE — 0001A COVID-19, MRNA, LNP-S, PF, 30 MCG/0.3 ML DOSE VACCINE: CPT | Mod: CV19,S$GLB,, | Performed by: INTERNAL MEDICINE

## 2021-03-25 PROCEDURE — 0001A COVID-19, MRNA, LNP-S, PF, 30 MCG/0.3 ML DOSE VACCINE: ICD-10-PCS | Mod: CV19,S$GLB,, | Performed by: INTERNAL MEDICINE

## 2021-04-15 ENCOUNTER — IMMUNIZATION (OUTPATIENT)
Dept: PRIMARY CARE CLINIC | Facility: CLINIC | Age: 31
End: 2021-04-15

## 2021-04-15 DIAGNOSIS — Z23 NEED FOR VACCINATION: Primary | ICD-10-CM

## 2021-04-15 PROCEDURE — 91300 COVID-19, MRNA, LNP-S, PF, 30 MCG/0.3 ML DOSE VACCINE: CPT | Mod: S$GLB,,, | Performed by: INTERNAL MEDICINE

## 2021-04-15 PROCEDURE — 91300 COVID-19, MRNA, LNP-S, PF, 30 MCG/0.3 ML DOSE VACCINE: ICD-10-PCS | Mod: S$GLB,,, | Performed by: INTERNAL MEDICINE

## 2021-04-15 PROCEDURE — 0002A COVID-19, MRNA, LNP-S, PF, 30 MCG/0.3 ML DOSE VACCINE: CPT | Mod: CV19,S$GLB,, | Performed by: INTERNAL MEDICINE

## 2021-04-15 PROCEDURE — 0002A COVID-19, MRNA, LNP-S, PF, 30 MCG/0.3 ML DOSE VACCINE: ICD-10-PCS | Mod: CV19,S$GLB,, | Performed by: INTERNAL MEDICINE

## 2021-05-10 ENCOUNTER — OFFICE VISIT (OUTPATIENT)
Dept: URGENT CARE | Facility: CLINIC | Age: 31
End: 2021-05-10
Payer: COMMERCIAL

## 2021-05-10 VITALS
OXYGEN SATURATION: 98 % | SYSTOLIC BLOOD PRESSURE: 135 MMHG | WEIGHT: 216 LBS | RESPIRATION RATE: 18 BRPM | BODY MASS INDEX: 39.75 KG/M2 | TEMPERATURE: 97 F | HEART RATE: 79 BPM | HEIGHT: 62 IN | DIASTOLIC BLOOD PRESSURE: 89 MMHG

## 2021-05-10 DIAGNOSIS — N94.6 DYSMENORRHEA: Primary | ICD-10-CM

## 2021-05-10 PROCEDURE — 99214 OFFICE O/P EST MOD 30 MIN: CPT | Mod: 25,S$GLB,, | Performed by: PHYSICIAN ASSISTANT

## 2021-05-10 PROCEDURE — 96372 THER/PROPH/DIAG INJ SC/IM: CPT | Mod: S$GLB,,, | Performed by: PHYSICIAN ASSISTANT

## 2021-05-10 PROCEDURE — 96372 PR INJECTION,THERAP/PROPH/DIAG2ST, IM OR SUBCUT: ICD-10-PCS | Mod: S$GLB,,, | Performed by: PHYSICIAN ASSISTANT

## 2021-05-10 PROCEDURE — 99214 PR OFFICE/OUTPT VISIT, EST, LEVL IV, 30-39 MIN: ICD-10-PCS | Mod: 25,S$GLB,, | Performed by: PHYSICIAN ASSISTANT

## 2021-05-10 RX ORDER — KETOROLAC TROMETHAMINE 30 MG/ML
30 INJECTION, SOLUTION INTRAMUSCULAR; INTRAVENOUS
Status: COMPLETED | OUTPATIENT
Start: 2021-05-10 | End: 2021-05-10

## 2021-05-10 RX ADMIN — KETOROLAC TROMETHAMINE 30 MG: 30 INJECTION, SOLUTION INTRAMUSCULAR; INTRAVENOUS at 01:05

## 2021-05-13 ENCOUNTER — OFFICE VISIT (OUTPATIENT)
Dept: OBSTETRICS AND GYNECOLOGY | Facility: CLINIC | Age: 31
End: 2021-05-13
Payer: COMMERCIAL

## 2021-05-13 VITALS
DIASTOLIC BLOOD PRESSURE: 94 MMHG | SYSTOLIC BLOOD PRESSURE: 130 MMHG | BODY MASS INDEX: 40.71 KG/M2 | WEIGHT: 222.63 LBS

## 2021-05-13 DIAGNOSIS — R10.2 PELVIC PAIN IN FEMALE: Primary | ICD-10-CM

## 2021-05-13 PROCEDURE — 99999 PR PBB SHADOW E&M-EST. PATIENT-LVL III: CPT | Mod: PBBFAC,,, | Performed by: NURSE PRACTITIONER

## 2021-05-13 PROCEDURE — 99203 OFFICE O/P NEW LOW 30 MIN: CPT | Mod: S$GLB,,, | Performed by: NURSE PRACTITIONER

## 2021-05-13 PROCEDURE — 99203 PR OFFICE/OUTPT VISIT, NEW, LEVL III, 30-44 MIN: ICD-10-PCS | Mod: S$GLB,,, | Performed by: NURSE PRACTITIONER

## 2021-05-13 PROCEDURE — 99999 PR PBB SHADOW E&M-EST. PATIENT-LVL III: ICD-10-PCS | Mod: PBBFAC,,, | Performed by: NURSE PRACTITIONER

## 2021-05-13 RX ORDER — KETOROLAC TROMETHAMINE 10 MG/1
10 TABLET, FILM COATED ORAL EVERY 6 HOURS
Qty: 30 TABLET | Refills: 3 | Status: SHIPPED | OUTPATIENT
Start: 2021-05-13 | End: 2021-05-18

## 2021-05-14 ENCOUNTER — TELEPHONE (OUTPATIENT)
Dept: OBSTETRICS AND GYNECOLOGY | Facility: CLINIC | Age: 31
End: 2021-05-14

## 2021-05-14 ENCOUNTER — PATIENT MESSAGE (OUTPATIENT)
Dept: OBSTETRICS AND GYNECOLOGY | Facility: CLINIC | Age: 31
End: 2021-05-14

## 2021-05-24 ENCOUNTER — OFFICE VISIT (OUTPATIENT)
Dept: FAMILY MEDICINE | Facility: HOSPITAL | Age: 31
End: 2021-05-24
Attending: FAMILY MEDICINE
Payer: COMMERCIAL

## 2021-05-24 ENCOUNTER — HOSPITAL ENCOUNTER (OUTPATIENT)
Dept: RADIOLOGY | Facility: HOSPITAL | Age: 31
Discharge: HOME | End: 2021-05-24
Attending: STUDENT IN AN ORGANIZED HEALTH CARE EDUCATION/TRAINING PROGRAM
Payer: COMMERCIAL

## 2021-05-24 VITALS
WEIGHT: 222 LBS | BODY MASS INDEX: 40.85 KG/M2 | HEART RATE: 84 BPM | HEIGHT: 62 IN | SYSTOLIC BLOOD PRESSURE: 130 MMHG | DIASTOLIC BLOOD PRESSURE: 90 MMHG

## 2021-05-24 DIAGNOSIS — J01.90 ACUTE BACTERIAL RHINOSINUSITIS: ICD-10-CM

## 2021-05-24 DIAGNOSIS — B96.89 ACUTE BACTERIAL RHINOSINUSITIS: Primary | ICD-10-CM

## 2021-05-24 DIAGNOSIS — J01.90 ACUTE BACTERIAL RHINOSINUSITIS: Primary | ICD-10-CM

## 2021-05-24 DIAGNOSIS — B96.89 ACUTE BACTERIAL RHINOSINUSITIS: ICD-10-CM

## 2021-05-24 PROCEDURE — 71046 X-RAY EXAM CHEST 2 VIEWS: CPT | Mod: 26,,, | Performed by: RADIOLOGY

## 2021-05-24 PROCEDURE — 71046 X-RAY EXAM CHEST 2 VIEWS: CPT | Mod: TC,FY

## 2021-05-24 PROCEDURE — 99213 OFFICE O/P EST LOW 20 MIN: CPT | Performed by: STUDENT IN AN ORGANIZED HEALTH CARE EDUCATION/TRAINING PROGRAM

## 2021-05-24 PROCEDURE — 71046 XR CHEST PA AND LATERAL: ICD-10-PCS | Mod: 26,,, | Performed by: RADIOLOGY

## 2021-05-24 RX ORDER — DOXYCYCLINE HYCLATE 100 MG
100 TABLET ORAL EVERY 12 HOURS
Qty: 14 TABLET | Refills: 0 | Status: SHIPPED | OUTPATIENT
Start: 2021-05-24 | End: 2021-05-31

## 2021-06-01 ENCOUNTER — TELEPHONE (OUTPATIENT)
Dept: OBSTETRICS AND GYNECOLOGY | Facility: CLINIC | Age: 31
End: 2021-06-01

## 2021-06-10 ENCOUNTER — OFFICE VISIT (OUTPATIENT)
Dept: OBSTETRICS AND GYNECOLOGY | Facility: CLINIC | Age: 31
End: 2021-06-10
Payer: COMMERCIAL

## 2021-06-10 VITALS
WEIGHT: 225 LBS | HEIGHT: 62 IN | DIASTOLIC BLOOD PRESSURE: 74 MMHG | BODY MASS INDEX: 41.41 KG/M2 | SYSTOLIC BLOOD PRESSURE: 118 MMHG

## 2021-06-10 DIAGNOSIS — N70.11 HYDROSALPINX: Primary | ICD-10-CM

## 2021-06-10 DIAGNOSIS — G89.29 CHRONIC FEMALE PELVIC PAIN: ICD-10-CM

## 2021-06-10 DIAGNOSIS — R10.2 CHRONIC FEMALE PELVIC PAIN: ICD-10-CM

## 2021-06-10 PROCEDURE — 99213 OFFICE O/P EST LOW 20 MIN: CPT | Mod: S$GLB,,, | Performed by: OBSTETRICS & GYNECOLOGY

## 2021-06-10 PROCEDURE — 99213 PR OFFICE/OUTPT VISIT, EST, LEVL III, 20-29 MIN: ICD-10-PCS | Mod: S$GLB,,, | Performed by: OBSTETRICS & GYNECOLOGY

## 2021-06-10 PROCEDURE — 99999 PR PBB SHADOW E&M-EST. PATIENT-LVL III: ICD-10-PCS | Mod: PBBFAC,,, | Performed by: OBSTETRICS & GYNECOLOGY

## 2021-06-10 PROCEDURE — 99999 PR PBB SHADOW E&M-EST. PATIENT-LVL III: CPT | Mod: PBBFAC,,, | Performed by: OBSTETRICS & GYNECOLOGY

## 2021-06-10 RX ORDER — KETOROLAC TROMETHAMINE 10 MG/1
10 TABLET, FILM COATED ORAL EVERY 6 HOURS
COMMUNITY
End: 2022-06-29

## 2021-06-17 ENCOUNTER — TELEPHONE (OUTPATIENT)
Dept: OBSTETRICS AND GYNECOLOGY | Facility: CLINIC | Age: 31
End: 2021-06-17

## 2021-07-01 ENCOUNTER — HOSPITAL ENCOUNTER (OUTPATIENT)
Dept: RADIOLOGY | Facility: HOSPITAL | Age: 31
Discharge: HOME | End: 2021-07-01
Attending: OBSTETRICS & GYNECOLOGY
Payer: COMMERCIAL

## 2021-07-01 ENCOUNTER — OFFICE VISIT (OUTPATIENT)
Dept: OBSTETRICS AND GYNECOLOGY | Facility: CLINIC | Age: 31
End: 2021-07-01
Payer: COMMERCIAL

## 2021-07-01 VITALS
BODY MASS INDEX: 41.59 KG/M2 | SYSTOLIC BLOOD PRESSURE: 114 MMHG | DIASTOLIC BLOOD PRESSURE: 74 MMHG | HEIGHT: 62 IN | WEIGHT: 226 LBS

## 2021-07-01 DIAGNOSIS — R10.2 CHRONIC FEMALE PELVIC PAIN: ICD-10-CM

## 2021-07-01 DIAGNOSIS — N70.11 HYDROSALPINX: Primary | ICD-10-CM

## 2021-07-01 DIAGNOSIS — N70.11 HYDROSALPINX: ICD-10-CM

## 2021-07-01 DIAGNOSIS — G89.29 CHRONIC FEMALE PELVIC PAIN: ICD-10-CM

## 2021-07-01 DIAGNOSIS — N80.9 ENDOMETRIOSIS: ICD-10-CM

## 2021-07-01 PROCEDURE — 76856 US EXAM PELVIC COMPLETE: CPT | Mod: 26,,, | Performed by: RADIOLOGY

## 2021-07-01 PROCEDURE — 76830 TRANSVAGINAL US NON-OB: CPT | Mod: 26,,, | Performed by: RADIOLOGY

## 2021-07-01 PROCEDURE — 99213 OFFICE O/P EST LOW 20 MIN: CPT | Mod: S$GLB,,, | Performed by: OBSTETRICS & GYNECOLOGY

## 2021-07-01 PROCEDURE — 76856 US EXAM PELVIC COMPLETE: CPT | Mod: TC

## 2021-07-01 PROCEDURE — 76830 US PELVIS COMP WITH TRANSVAG NON-OB (XPD): ICD-10-PCS | Mod: 26,,, | Performed by: RADIOLOGY

## 2021-07-01 PROCEDURE — 99999 PR PBB SHADOW E&M-EST. PATIENT-LVL II: CPT | Mod: PBBFAC,,, | Performed by: OBSTETRICS & GYNECOLOGY

## 2021-07-01 PROCEDURE — 99213 PR OFFICE/OUTPT VISIT, EST, LEVL III, 20-29 MIN: ICD-10-PCS | Mod: S$GLB,,, | Performed by: OBSTETRICS & GYNECOLOGY

## 2021-07-01 PROCEDURE — 76856 US PELVIS COMP WITH TRANSVAG NON-OB (XPD): ICD-10-PCS | Mod: 26,,, | Performed by: RADIOLOGY

## 2021-07-01 PROCEDURE — 99999 PR PBB SHADOW E&M-EST. PATIENT-LVL II: ICD-10-PCS | Mod: PBBFAC,,, | Performed by: OBSTETRICS & GYNECOLOGY

## 2021-07-02 RX ORDER — LEUPROLIDE ACETATE 3.75 MG
3.75 KIT INTRAMUSCULAR
Qty: 1 KIT | Refills: 12 | Status: SHIPPED | OUTPATIENT
Start: 2021-07-02 | End: 2022-06-29

## 2021-07-06 ENCOUNTER — SPECIALTY PHARMACY (OUTPATIENT)
Dept: PHARMACY | Facility: CLINIC | Age: 31
End: 2021-07-06

## 2021-07-15 ENCOUNTER — TELEPHONE (OUTPATIENT)
Dept: OBSTETRICS AND GYNECOLOGY | Facility: CLINIC | Age: 31
End: 2021-07-15

## 2021-07-15 DIAGNOSIS — N80.9 ENDOMETRIOSIS: Primary | ICD-10-CM

## 2021-07-20 ENCOUNTER — PATIENT MESSAGE (OUTPATIENT)
Dept: OBSTETRICS AND GYNECOLOGY | Facility: CLINIC | Age: 31
End: 2021-07-20

## 2021-07-26 DIAGNOSIS — N80.9 ENDOMETRIOSIS: Primary | ICD-10-CM

## 2021-10-21 ENCOUNTER — TELEPHONE (OUTPATIENT)
Dept: OBSTETRICS AND GYNECOLOGY | Facility: CLINIC | Age: 31
End: 2021-10-21
Payer: COMMERCIAL

## 2021-10-21 DIAGNOSIS — N70.11 HYDROSALPINX: Primary | ICD-10-CM

## 2021-10-25 ENCOUNTER — TELEPHONE (OUTPATIENT)
Dept: OBSTETRICS AND GYNECOLOGY | Facility: CLINIC | Age: 31
End: 2021-10-25
Payer: COMMERCIAL

## 2021-10-25 ENCOUNTER — PATIENT MESSAGE (OUTPATIENT)
Dept: OBSTETRICS AND GYNECOLOGY | Facility: CLINIC | Age: 31
End: 2021-10-25
Payer: COMMERCIAL

## 2021-10-25 DIAGNOSIS — N83.209 CYST OF OVARY, UNSPECIFIED LATERALITY: Primary | ICD-10-CM

## 2021-10-26 RX ORDER — MEDROXYPROGESTERONE ACETATE 150 MG/ML
150 INJECTION, SUSPENSION INTRAMUSCULAR
Status: SHIPPED | OUTPATIENT
Start: 2021-10-26

## 2021-10-27 ENCOUNTER — CLINICAL SUPPORT (OUTPATIENT)
Dept: OBSTETRICS AND GYNECOLOGY | Facility: CLINIC | Age: 31
End: 2021-10-27
Payer: COMMERCIAL

## 2021-10-27 DIAGNOSIS — Z30.42 SURVEILLANCE FOR DEPO-PROVERA CONTRACEPTION: Primary | ICD-10-CM

## 2021-10-27 LAB
B-HCG UR QL: NEGATIVE
CTP QC/QA: YES

## 2021-10-27 PROCEDURE — 81025 URINE PREGNANCY TEST: CPT | Mod: S$GLB,,, | Performed by: OBSTETRICS & GYNECOLOGY

## 2021-10-27 PROCEDURE — 96372 PR INJECTION,THERAP/PROPH/DIAG2ST, IM OR SUBCUT: ICD-10-PCS | Mod: S$GLB,,, | Performed by: OBSTETRICS & GYNECOLOGY

## 2021-10-27 PROCEDURE — 81025 POCT URINE PREGNANCY: ICD-10-PCS | Mod: S$GLB,,, | Performed by: OBSTETRICS & GYNECOLOGY

## 2021-10-27 PROCEDURE — 96372 THER/PROPH/DIAG INJ SC/IM: CPT | Mod: S$GLB,,, | Performed by: OBSTETRICS & GYNECOLOGY

## 2021-10-27 RX ADMIN — MEDROXYPROGESTERONE ACETATE 150 MG: 150 INJECTION, SUSPENSION INTRAMUSCULAR at 03:10

## 2021-10-28 RX ORDER — IBUPROFEN 800 MG/1
800 TABLET ORAL EVERY 8 HOURS PRN
Qty: 30 TABLET | Refills: 2 | Status: SHIPPED | OUTPATIENT
Start: 2021-10-28 | End: 2022-06-29

## 2021-11-11 ENCOUNTER — PATIENT MESSAGE (OUTPATIENT)
Dept: OBSTETRICS AND GYNECOLOGY | Facility: CLINIC | Age: 31
End: 2021-11-11
Payer: COMMERCIAL

## 2021-11-22 ENCOUNTER — PATIENT MESSAGE (OUTPATIENT)
Dept: OBSTETRICS AND GYNECOLOGY | Facility: CLINIC | Age: 31
End: 2021-11-22
Payer: COMMERCIAL

## 2021-11-24 ENCOUNTER — PATIENT MESSAGE (OUTPATIENT)
Dept: OBSTETRICS AND GYNECOLOGY | Facility: CLINIC | Age: 31
End: 2021-11-24
Payer: COMMERCIAL

## 2021-12-03 ENCOUNTER — TELEPHONE (OUTPATIENT)
Dept: OBSTETRICS AND GYNECOLOGY | Facility: CLINIC | Age: 31
End: 2021-12-03
Payer: COMMERCIAL

## 2021-12-07 ENCOUNTER — PATIENT MESSAGE (OUTPATIENT)
Dept: OBSTETRICS AND GYNECOLOGY | Facility: CLINIC | Age: 31
End: 2021-12-07
Payer: COMMERCIAL

## 2021-12-07 ENCOUNTER — TELEPHONE (OUTPATIENT)
Dept: OBSTETRICS AND GYNECOLOGY | Facility: CLINIC | Age: 31
End: 2021-12-07
Payer: COMMERCIAL

## 2021-12-07 DIAGNOSIS — N83.201 CYST OF RIGHT OVARY: Primary | ICD-10-CM

## 2021-12-10 ENCOUNTER — PATIENT MESSAGE (OUTPATIENT)
Dept: OBSTETRICS AND GYNECOLOGY | Facility: CLINIC | Age: 31
End: 2021-12-10
Payer: COMMERCIAL

## 2021-12-13 ENCOUNTER — TELEPHONE (OUTPATIENT)
Dept: ADMINISTRATIVE | Facility: OTHER | Age: 31
End: 2021-12-13
Payer: COMMERCIAL

## 2021-12-29 ENCOUNTER — PATIENT MESSAGE (OUTPATIENT)
Dept: ADMINISTRATIVE | Facility: OTHER | Age: 31
End: 2021-12-29
Payer: COMMERCIAL

## 2021-12-30 ENCOUNTER — OFFICE VISIT (OUTPATIENT)
Dept: URGENT CARE | Facility: CLINIC | Age: 31
End: 2021-12-30
Payer: COMMERCIAL

## 2021-12-30 VITALS
DIASTOLIC BLOOD PRESSURE: 77 MMHG | WEIGHT: 215 LBS | HEART RATE: 80 BPM | BODY MASS INDEX: 39.56 KG/M2 | RESPIRATION RATE: 17 BRPM | SYSTOLIC BLOOD PRESSURE: 130 MMHG | HEIGHT: 62 IN | OXYGEN SATURATION: 98 % | TEMPERATURE: 98 F

## 2021-12-30 DIAGNOSIS — R05.9 COUGH: Primary | ICD-10-CM

## 2021-12-30 DIAGNOSIS — J06.9 URI, ACUTE: ICD-10-CM

## 2021-12-30 LAB
CTP QC/QA: YES
SARS-COV-2 RDRP RESP QL NAA+PROBE: NEGATIVE

## 2021-12-30 PROCEDURE — 99213 PR OFFICE/OUTPT VISIT, EST, LEVL III, 20-29 MIN: ICD-10-PCS | Mod: S$GLB,,, | Performed by: FAMILY MEDICINE

## 2021-12-30 PROCEDURE — U0002 COVID-19 LAB TEST NON-CDC: HCPCS | Mod: QW,S$GLB,, | Performed by: FAMILY MEDICINE

## 2021-12-30 PROCEDURE — 99213 OFFICE O/P EST LOW 20 MIN: CPT | Mod: S$GLB,,, | Performed by: FAMILY MEDICINE

## 2021-12-30 PROCEDURE — U0002: ICD-10-PCS | Mod: QW,S$GLB,, | Performed by: FAMILY MEDICINE

## 2021-12-30 RX ORDER — LORATADINE 10 MG/1
10 TABLET ORAL DAILY
Qty: 30 TABLET | Refills: 2 | Status: SHIPPED | OUTPATIENT
Start: 2021-12-30 | End: 2022-06-29

## 2022-01-05 ENCOUNTER — PATIENT MESSAGE (OUTPATIENT)
Dept: OBSTETRICS AND GYNECOLOGY | Facility: CLINIC | Age: 32
End: 2022-01-05

## 2022-01-12 ENCOUNTER — PATIENT MESSAGE (OUTPATIENT)
Dept: OBSTETRICS AND GYNECOLOGY | Facility: CLINIC | Age: 32
End: 2022-01-12

## 2022-01-19 ENCOUNTER — CLINICAL SUPPORT (OUTPATIENT)
Dept: OBSTETRICS AND GYNECOLOGY | Facility: CLINIC | Age: 32
End: 2022-01-19
Payer: COMMERCIAL

## 2022-01-19 DIAGNOSIS — Z30.42 SURVEILLANCE FOR DEPO-PROVERA CONTRACEPTION: Primary | ICD-10-CM

## 2022-01-19 PROCEDURE — 99999 PR PBB SHADOW E&M-EST. PATIENT-LVL I: CPT | Mod: PBBFAC,,,

## 2022-01-19 PROCEDURE — 96372 PR INJECTION,THERAP/PROPH/DIAG2ST, IM OR SUBCUT: ICD-10-PCS | Mod: S$GLB,,, | Performed by: OBSTETRICS & GYNECOLOGY

## 2022-01-19 PROCEDURE — 96372 THER/PROPH/DIAG INJ SC/IM: CPT | Mod: S$GLB,,, | Performed by: OBSTETRICS & GYNECOLOGY

## 2022-01-19 PROCEDURE — 99999 PR PBB SHADOW E&M-EST. PATIENT-LVL I: ICD-10-PCS | Mod: PBBFAC,,,

## 2022-01-19 RX ADMIN — MEDROXYPROGESTERONE ACETATE 150 MG: 150 INJECTION, SUSPENSION INTRAMUSCULAR at 11:01

## 2022-01-19 NOTE — PROGRESS NOTES
150 mg Depo-Provera given LUOQG. Patient has been receiving Depo with no complications. Depo sheet given. Next Depo due 4/06/22-4/20/22.  Appointment made for 4/06/22 @ 11AM. Patient advised to wait 15 min without signs/symptoms. Patient verbalized understanding. Patient tolerated well.

## 2022-01-25 ENCOUNTER — PATIENT MESSAGE (OUTPATIENT)
Dept: OBSTETRICS AND GYNECOLOGY | Facility: CLINIC | Age: 32
End: 2022-01-25

## 2022-01-29 ENCOUNTER — HOSPITAL ENCOUNTER (OUTPATIENT)
Dept: RADIOLOGY | Facility: HOSPITAL | Age: 32
Discharge: HOME | End: 2022-01-29
Attending: OBSTETRICS & GYNECOLOGY
Payer: COMMERCIAL

## 2022-01-29 DIAGNOSIS — N83.209 CYST OF OVARY, UNSPECIFIED LATERALITY: ICD-10-CM

## 2022-01-29 PROCEDURE — 76830 TRANSVAGINAL US NON-OB: CPT | Mod: 26,,, | Performed by: RADIOLOGY

## 2022-01-29 PROCEDURE — 76830 TRANSVAGINAL US NON-OB: CPT | Mod: TC

## 2022-01-29 PROCEDURE — 76856 US EXAM PELVIC COMPLETE: CPT | Mod: 26,,, | Performed by: RADIOLOGY

## 2022-01-29 PROCEDURE — 76856 US PELVIS COMP WITH TRANSVAG NON-OB (XPD): ICD-10-PCS | Mod: 26,,, | Performed by: RADIOLOGY

## 2022-01-29 PROCEDURE — 76830 US PELVIS COMP WITH TRANSVAG NON-OB (XPD): ICD-10-PCS | Mod: 26,,, | Performed by: RADIOLOGY

## 2022-01-31 ENCOUNTER — PATIENT MESSAGE (OUTPATIENT)
Dept: OBSTETRICS AND GYNECOLOGY | Facility: CLINIC | Age: 32
End: 2022-01-31

## 2022-02-01 ENCOUNTER — PATIENT MESSAGE (OUTPATIENT)
Dept: OBSTETRICS AND GYNECOLOGY | Facility: CLINIC | Age: 32
End: 2022-02-01

## 2022-04-06 ENCOUNTER — CLINICAL SUPPORT (OUTPATIENT)
Dept: OBSTETRICS AND GYNECOLOGY | Facility: CLINIC | Age: 32
End: 2022-04-06
Payer: COMMERCIAL

## 2022-04-06 ENCOUNTER — PATIENT MESSAGE (OUTPATIENT)
Dept: OBSTETRICS AND GYNECOLOGY | Facility: CLINIC | Age: 32
End: 2022-04-06

## 2022-04-06 DIAGNOSIS — Z30.42 SURVEILLANCE FOR DEPO-PROVERA CONTRACEPTION: Primary | ICD-10-CM

## 2022-04-06 PROCEDURE — 96372 PR INJECTION,THERAP/PROPH/DIAG2ST, IM OR SUBCUT: ICD-10-PCS | Mod: S$GLB,,, | Performed by: OBSTETRICS & GYNECOLOGY

## 2022-04-06 PROCEDURE — 99999 PR PBB SHADOW E&M-EST. PATIENT-LVL I: CPT | Mod: PBBFAC,,,

## 2022-04-06 PROCEDURE — 96372 THER/PROPH/DIAG INJ SC/IM: CPT | Mod: S$GLB,,, | Performed by: OBSTETRICS & GYNECOLOGY

## 2022-04-06 PROCEDURE — 99999 PR PBB SHADOW E&M-EST. PATIENT-LVL I: ICD-10-PCS | Mod: PBBFAC,,,

## 2022-04-06 RX ADMIN — MEDROXYPROGESTERONE ACETATE 150 MG: 150 INJECTION, SUSPENSION INTRAMUSCULAR at 11:04

## 2022-04-06 NOTE — PROGRESS NOTES
150 mg Depo-Provera given RUOQG. Patient has been receiving Depo with no complications. Depo sheet given. Next Depo due 6/22/22-7/06/22.  Appointment made for 6/29/22 @ 1:40 PM. Patient advised to wait 15 min without signs/symptoms. Patient verbalized understanding. Patient tolerated well.    Educated patient that annual appointment needed before next injection. Patient scheduled for annual on 6/29/22 at 1PM. Patient verbalized understanding.

## 2022-05-02 ENCOUNTER — TELEPHONE (OUTPATIENT)
Dept: OBSTETRICS AND GYNECOLOGY | Facility: CLINIC | Age: 32
End: 2022-05-02
Payer: COMMERCIAL

## 2022-05-02 ENCOUNTER — PATIENT MESSAGE (OUTPATIENT)
Dept: OBSTETRICS AND GYNECOLOGY | Facility: CLINIC | Age: 32
End: 2022-05-02
Payer: COMMERCIAL

## 2022-05-02 NOTE — TELEPHONE ENCOUNTER
----- Message from Clemencia Tierney sent at 5/2/2022 10:52 AM CDT -----  Type:  Needs Medical Advice    Who Called: pt  Symptoms (please be specific): pt is requesting a return call to discuss a ultrasound      Would the patient rather a call back or a response via [facility]? Please call  Best Call Back Number: [phone]  Additional Information:

## 2022-05-02 NOTE — TELEPHONE ENCOUNTER
Called pt back. She went to go to her [facility] and they were asking for a copay of more than 200 dollars. Pt wanted to know if there was anywhere else that she can go to get the ultrasound done. Told her that typically, a copay will stay the same because it is dependent on what type of insurance plan you have but told pt that she can call the [facility] to get an estimate to see if it is any different. Pt verbalized understanding.

## 2022-05-02 NOTE — TELEPHONE ENCOUNTER
----- Message from Ezio Juárez sent at 5/2/2022  3:07 PM CDT -----  Contact: pt.  .Type:  Needs Medical Advice    Who Called: pt  Would the patient rather a call back or a response via [facility]? Call back  Best Call Back Number: [phone]  Additional Information: Pt. Is requesting a call back from the office

## 2022-05-11 ENCOUNTER — PATIENT MESSAGE (OUTPATIENT)
Dept: OBSTETRICS AND GYNECOLOGY | Facility: CLINIC | Age: 32
End: 2022-05-11
Payer: COMMERCIAL

## 2022-05-14 ENCOUNTER — HOSPITAL ENCOUNTER (EMERGENCY)
Facility: HOSPITAL | Age: 32
Discharge: HOME | End: 2022-05-14
Attending: EMERGENCY MEDICINE
Payer: COMMERCIAL

## 2022-05-14 VITALS
HEART RATE: 92 BPM | RESPIRATION RATE: 19 BRPM | SYSTOLIC BLOOD PRESSURE: 134 MMHG | BODY MASS INDEX: 38.23 KG/M2 | TEMPERATURE: 98 F | WEIGHT: 209 LBS | OXYGEN SATURATION: 99 % | DIASTOLIC BLOOD PRESSURE: 94 MMHG

## 2022-05-14 DIAGNOSIS — U07.1 COVID-19 VIRUS INFECTION: Primary | ICD-10-CM

## 2022-05-14 LAB — SARS-COV-2 RDRP RESP QL NAA+PROBE: POSITIVE

## 2022-05-14 PROCEDURE — U0002 COVID-19 LAB TEST NON-CDC: HCPCS | Performed by: EMERGENCY MEDICINE

## 2022-05-14 PROCEDURE — 99282 EMERGENCY DEPT VISIT SF MDM: CPT | Mod: 25

## 2022-05-14 NOTE — DISCHARGE INSTRUCTIONS
Your test was POSITIVE for COVID-19 (coronavirus).       Please isolate yourself at home.  You may leave home and/or return to work once the following conditions are met:    If you were not hospitalized and are not moderately to severely immunocompromised:   More than 5 days since symptoms first appeared AND  More than 24 hours fever free without medications AND  Symptoms are improving  Continue to wear a mask around others for 5 additional days.    If you were hospitalized OR are moderately to severely immunocompromised:  More than 20 days since symptoms first appeared  More than 24 hours fever free without medications  Symptoms have improved    If you had no symptoms but tested positive:  More than 5 days since the date of the first positive test (20 days if moderately to severely immunocompromised). If you develop symptoms, then use the guidelines above.  Continue to wear a mask around others for 5 additional days.      Contact Tracing    As one of the next steps, you will receive a call or text from the [place] Department of Health ([facility]) COVID-19 Tracing Team. See the contact information below so you know not to ignore the health departments call. It is important that you contact them back immediately so they can help.      Contact Tracer Number:  [phone]  Caller ID for most carriers: [facility]     What is contact tracing?  Contact tracing is a process that helps identify everyone who has been in close contact with an infected person. Contact tracers let those people know they may have been exposed and guide them on next steps. Confidentiality is important for everyone; no one will be told who may have exposed them to the virus.  Your involvement is important. The more we know about where and how this virus is spreading, the better chance we have at stopping it from spreading further.  What does exposure mean?  Exposure means you have been within 6 feet for more than 15 minutes with a person who  has or had COVID-19.  What kind of questions do the contact tracers ask?  A contact tracer will confirm your basic contact information including name, address, phone number, and next of kin, as well as asking about any symptoms you may have had. Theyll also ask you how you think you may have gotten sick, such as places where you may have been exposed to the virus, and people you were with. Those names will never be shared with anyone outside of that call, and will only be used to help trace and stop the spread of the virus.   I have privacy concerns. How will the state use my information?  Your privacy about your health is important. All calls are completed using call centers that use the appropriate health privacy protection measures (HIPAA compliance), meaning that your patient information is safe. No one will ever ask you any questions related to immigration status. Your health comes first.   Do I have to participate?  You do not have to participate, but we strongly encourage you to. Contact tracing can help us catch and control new outbreaks as theyre developing to keep your friends and family safe.   What if I dont hear from anyone?  If you dont receive a call within 24 hours, you can call the number above right away to inquire about your status. That line is open from 8:00 am - 8:00 p.m., 7 days a week.  Contact tracing saves lives! Together, we have the power to beat this virus and keep our loved ones and neighbors safe.    For more information see CDC link below.      https://www.cdc.gov/coronavirus/2019-ncov/hcp/guidance-prevent-spread.html#precautions        Sources:  [facility], [place] Department of Health and Lists of hospitals in the [place]           Sincerely,     Chino Hough MD

## 2022-05-14 NOTE — ED PROVIDER NOTES
Encounter Date: 5/14/2022       History     Chief Complaint   Patient presents with    COVID-19 Concerns     C/o chills, body aches, cough and headache for approx 2 days. Also reports subjective fever for 2 days. Ibuprofen taken last at approx 11pm. Denies sick contacts.      Pleasant healthy 31-year-old female, presents the ER for evaluation COVID concerns.  Complaining of myalgias cough chills headache for approximately 2 days.  Reports sick positive contacts at work.  Is COVID vaccinated but not boosted.  Came to the ER for further evaluation.        Review of patient's allergies indicates:   Allergen Reactions    Penicillins      No past medical history on file.  Past Surgical History:   Procedure Laterality Date    fibroid removal      LYMPH NODE BIOPSY      OVARIAN CYST REMOVAL       Family History   Problem Relation Age of Onset    No Known Problems Father     No Known Problems Sister     No Known Problems Brother     Breast cancer Neg Hx     Ovarian cancer Neg Hx      Social History     Tobacco Use    Smoking status: Never Smoker    Smokeless tobacco: Never Used   Substance Use Topics    Alcohol use: No    Drug use: No     Review of Systems   Constitutional: Positive for chills, fatigue and fever.   HENT: Positive for congestion and sore throat.    Respiratory: Negative for shortness of breath.    Cardiovascular: Negative for chest pain.   Gastrointestinal: Negative for abdominal pain.   All other systems reviewed and are negative.      Physical Exam     Initial Vitals [05/14/22 0203]   BP Pulse Resp Temp SpO2   (!) 134/94 92 19 98.1 °F (36.7 °C) 99 %      MAP       --         Physical Exam    Nursing note and vitals reviewed.  Constitutional: She appears well-developed and well-nourished.   HENT:   Head: Normocephalic and atraumatic.   Pulmonary/Chest: No respiratory distress.   Musculoskeletal:         General: Normal range of motion.     Neurological: She is alert and oriented to person,  place, and time. GCS score is 15. GCS eye subscore is 4. GCS verbal subscore is 5. GCS motor subscore is 6.   Psychiatric: She has a normal mood and affect. Thought content normal.         ED Course   Procedures  Labs Reviewed   SARS-COV-2 RNA AMPLIFICATION, QUAL - Abnormal; Notable for the following components:       Result Value    SARS-CoV-2 RNA, Amplification, Qual Positive (*)     All other components within normal limits    Narrative:     SCOVM  critical result(s) called and verbal readback obtained from   Esperanza Jiménez RN by CECILY 05/14/2022 02:52          Imaging Results    None          Medications - No data to display  Medical Decision Making:   Initial Assessment:   31-year-old female presents the ER for evaluation of COVID concerns.  COVID test ordered in triage and she is COVID positive.  Discussed with patient plan to discharge home return precautions oral COVID monitoring program.  Patient understood agreed plan patient will be discharged.                        Clinical Impression:   Final diagnoses:  [U07.1] COVID-19 virus infection (Primary)          ED Disposition Condition    Discharge Stable        ED Prescriptions     None        Follow-up Information     Follow up With Specialties Details Why Contact Info    Joshua Paredes MD Family Medicine Schedule an appointment as soon as possible for a visit  As needed [address] [facility] [id number]  [phone]             Chino Hough MD  05/14/22 6654

## 2022-06-29 ENCOUNTER — OFFICE VISIT (OUTPATIENT)
Dept: OBSTETRICS AND GYNECOLOGY | Facility: CLINIC | Age: 32
End: 2022-06-29
Payer: COMMERCIAL

## 2022-06-29 ENCOUNTER — CLINICAL SUPPORT (OUTPATIENT)
Dept: OBSTETRICS AND GYNECOLOGY | Facility: CLINIC | Age: 32
End: 2022-06-29
Payer: COMMERCIAL

## 2022-06-29 VITALS
DIASTOLIC BLOOD PRESSURE: 88 MMHG | BODY MASS INDEX: 41.17 KG/M2 | WEIGHT: 225.06 LBS | SYSTOLIC BLOOD PRESSURE: 126 MMHG

## 2022-06-29 DIAGNOSIS — Z01.419 ENCOUNTER FOR ANNUAL ROUTINE GYNECOLOGICAL EXAMINATION: Primary | ICD-10-CM

## 2022-06-29 DIAGNOSIS — Z30.42 SURVEILLANCE FOR DEPO-PROVERA CONTRACEPTION: Primary | ICD-10-CM

## 2022-06-29 DIAGNOSIS — Z11.3 SCREENING EXAMINATION FOR STD (SEXUALLY TRANSMITTED DISEASE): ICD-10-CM

## 2022-06-29 DIAGNOSIS — Z12.4 PAP SMEAR FOR CERVICAL CANCER SCREENING: ICD-10-CM

## 2022-06-29 PROCEDURE — 87624 HPV HI-RISK TYP POOLED RSLT: CPT | Performed by: OBSTETRICS & GYNECOLOGY

## 2022-06-29 PROCEDURE — 3079F DIAST BP 80-89 MM HG: CPT | Mod: CPTII,S$GLB,, | Performed by: OBSTETRICS & GYNECOLOGY

## 2022-06-29 PROCEDURE — 3079F PR MOST RECENT DIASTOLIC BLOOD PRESSURE 80-89 MM HG: ICD-10-PCS | Mod: CPTII,S$GLB,, | Performed by: OBSTETRICS & GYNECOLOGY

## 2022-06-29 PROCEDURE — 1160F RVW MEDS BY RX/DR IN RCRD: CPT | Mod: CPTII,S$GLB,, | Performed by: OBSTETRICS & GYNECOLOGY

## 2022-06-29 PROCEDURE — 3008F BODY MASS INDEX DOCD: CPT | Mod: CPTII,S$GLB,, | Performed by: OBSTETRICS & GYNECOLOGY

## 2022-06-29 PROCEDURE — 99395 PREV VISIT EST AGE 18-39: CPT | Mod: S$GLB,,, | Performed by: OBSTETRICS & GYNECOLOGY

## 2022-06-29 PROCEDURE — 87491 CHLMYD TRACH DNA AMP PROBE: CPT | Mod: 59 | Performed by: OBSTETRICS & GYNECOLOGY

## 2022-06-29 PROCEDURE — 1160F PR REVIEW ALL MEDS BY PRESCRIBER/CLIN PHARMACIST DOCUMENTED: ICD-10-PCS | Mod: CPTII,S$GLB,, | Performed by: OBSTETRICS & GYNECOLOGY

## 2022-06-29 PROCEDURE — 96372 PR INJECTION,THERAP/PROPH/DIAG2ST, IM OR SUBCUT: ICD-10-PCS | Mod: S$GLB,,, | Performed by: OBSTETRICS & GYNECOLOGY

## 2022-06-29 PROCEDURE — 87591 N.GONORRHOEAE DNA AMP PROB: CPT | Performed by: OBSTETRICS & GYNECOLOGY

## 2022-06-29 PROCEDURE — 3074F PR MOST RECENT SYSTOLIC BLOOD PRESSURE < 130 MM HG: ICD-10-PCS | Mod: CPTII,S$GLB,, | Performed by: OBSTETRICS & GYNECOLOGY

## 2022-06-29 PROCEDURE — 87481 CANDIDA DNA AMP PROBE: CPT | Mod: 59 | Performed by: OBSTETRICS & GYNECOLOGY

## 2022-06-29 PROCEDURE — 99999 PR PBB SHADOW E&M-EST. PATIENT-LVL I: ICD-10-PCS | Mod: PBBFAC,,,

## 2022-06-29 PROCEDURE — 96372 THER/PROPH/DIAG INJ SC/IM: CPT | Mod: S$GLB,,, | Performed by: OBSTETRICS & GYNECOLOGY

## 2022-06-29 PROCEDURE — 99999 PR PBB SHADOW E&M-EST. PATIENT-LVL III: ICD-10-PCS | Mod: PBBFAC,,, | Performed by: OBSTETRICS & GYNECOLOGY

## 2022-06-29 PROCEDURE — 1159F PR MEDICATION LIST DOCUMENTED IN MEDICAL RECORD: ICD-10-PCS | Mod: CPTII,S$GLB,, | Performed by: OBSTETRICS & GYNECOLOGY

## 2022-06-29 PROCEDURE — 88175 CYTOPATH C/V AUTO FLUID REDO: CPT | Performed by: OBSTETRICS & GYNECOLOGY

## 2022-06-29 PROCEDURE — 1159F MED LIST DOCD IN RCRD: CPT | Mod: CPTII,S$GLB,, | Performed by: OBSTETRICS & GYNECOLOGY

## 2022-06-29 PROCEDURE — 99999 PR PBB SHADOW E&M-EST. PATIENT-LVL I: CPT | Mod: PBBFAC,,,

## 2022-06-29 PROCEDURE — 3008F PR BODY MASS INDEX (BMI) DOCUMENTED: ICD-10-PCS | Mod: CPTII,S$GLB,, | Performed by: OBSTETRICS & GYNECOLOGY

## 2022-06-29 PROCEDURE — 3074F SYST BP LT 130 MM HG: CPT | Mod: CPTII,S$GLB,, | Performed by: OBSTETRICS & GYNECOLOGY

## 2022-06-29 PROCEDURE — 99395 PR PREVENTIVE VISIT,EST,18-39: ICD-10-PCS | Mod: S$GLB,,, | Performed by: OBSTETRICS & GYNECOLOGY

## 2022-06-29 PROCEDURE — 87801 DETECT AGNT MULT DNA AMPLI: CPT | Performed by: OBSTETRICS & GYNECOLOGY

## 2022-06-29 PROCEDURE — 99999 PR PBB SHADOW E&M-EST. PATIENT-LVL III: CPT | Mod: PBBFAC,,, | Performed by: OBSTETRICS & GYNECOLOGY

## 2022-06-29 RX ADMIN — MEDROXYPROGESTERONE ACETATE 150 MG: 150 INJECTION, SUSPENSION INTRAMUSCULAR at 01:06

## 2022-06-29 NOTE — PROGRESS NOTES
150 mg Depo-Provera given LUOQG. Patient has been receiving Depo with no complications. Depo sheet given. Next Depo due 9/14/22-9/28/22.  Appointment made for 9/19/22 at 09:00 AM. Patient advised to wait 15 min without signs/symptoms. Patient verbalized understanding. Patient tolerated well.

## 2022-06-29 NOTE — PROGRESS NOTES
Chief Complaint   Patient presents with    Well Woman       HISTORY OF PRESENT ILLNESS:   Evelin Taylor is a 31 y.o. female  who presents for well woman exam.  Patient's last menstrual period was 2022 (approximate)..    Cycles are  irregular on depo. Reports her pain is much improved on depo and would like to continue it. Reports had US at [facility] in May to monitor cyst but we haven't received the results.  Desires STD testing.       History reviewed. No pertinent past medical history.    Past Surgical History:   Procedure Laterality Date    fibroid removal      LYMPH NODE BIOPSY      OVARIAN CYST REMOVAL         Social History     Socioeconomic History    Marital status:    Tobacco Use    Smoking status: Never Smoker    Smokeless tobacco: Never Used   Substance and Sexual Activity    Alcohol use: No    Drug use: No    Sexual activity: Yes     Partners: Male     Birth control/protection: None       Family History   Problem Relation Age of Onset    No Known Problems Father     No Known Problems Sister     No Known Problems Brother     Breast cancer Neg Hx     Ovarian cancer Neg Hx        OB History    Para Term  AB Living   0 0 0 0 0 0   SAB IAB Ectopic Multiple Live Births   0 0 0 0 0       COMPREHENSIVE GYN HISTORY:  PAP History: Denies abnormal Paps.  Infection History: Denies STDs. Denies PID.  Benign History: had uterine fibroids. had ovarian cysts. Has endometriosis.   Cancer History: Denies cervical cancer. Denies uterine cancer or hyperplasia. Denies ovarian cancer. Denies vulvar cancer or pre-cancer. Denies vaginal cancer or pre-cancer. Denies breast cancer. Denies colon cancer.  Sexual Activity History:   reports being sexually active and has had partner(s) who are male. She reports using the following method of birth control/protection: None.   Menstrual History: Age of menarche: 11 years. Every month, flows for 7 days. moderate flow.  Dysmenorrhea History:  Reports severe dysmenorrhea.  Contraception: depo  2014 open myomectomy   2015 [facility] ovarian cystectomy   Had HSG with bilateral tubal blockage and was told had hydrosalpinx     ROS:  GENERAL: Denies weight gain or weight loss. Feeling well overall.   SKIN: Denies rash or lesions.   HEAD: Denies headache.   NODES: Denies enlarged lymph nodes.   CHEST: Denies shortness of breath.   ABDOMEN: No abdominal pain, constipation, diarrhea, nausea, vomiting or rectal bleeding.   URINARY: No frequency, dysuria, hematuria, or burning on urination.  REPRODUCTIVE: See HPI.   BREASTS: The patient denies pain, lumps, or nipple discharge.       /88   Wt 102.1 kg (225 lb 1.4 oz)   LMP 05/29/2022 (Approximate)   BMI 41.17 kg/m²     APPEARANCE: Well nourished, well developed, in no acute distress.  NECK: Neck symmetric without  thyromegaly.  NODES: No inguinal, cervical lymph node enlargement.  CHEST: Lungs clear to auscultation.  HEART: Regular rate and rhythm, no murmurs, rubs or gallops.  ABDOMEN: Soft. No tenderness or masses. No hernias. No hepatosplenomegaly.  BREASTS: Symmetrical, no skin changes or visible lesions. No palpable masses, nipple discharge or adenopathy bilaterally.  PELVIC:   VULVA: No lesions. Normal female genitalia.  URETHRAL MEATUS: Normal size and location, no lesions, no prolapse.  URETHRA: No masses, tenderness, prolapse or scarring.  VAGINA: Moist and well rugated, no discharge, no significant cystocele or rectocele.  CERVIX: No lesions and discharge.  UTERUS: slightly enlarged size, regular shape, mobile, non-tender, bladder base nontender.  ADNEXA: No masses or tenderness.        1. Encounter for annual routine gynecological examination    2. Pap smear for cervical cancer screening    3. Screening examination for STD (sexually transmitted disease)        Plan:  Routine gyn s/p normal breast exam. Pap With HPV cotesting ordered . STD testing: GC/CT/trich, syphilis, HBV/HCV and HIV ordered.  discussed contraception and desires  depo-provera. Will request records from previous US at [facility] because have been following cyst on Us, discussed if stable then can continue to follow conservatively but if not then may need removal, she would like to avoid surgery if possible.

## 2022-06-30 LAB
C TRACH DNA SPEC QL NAA+PROBE: NOT DETECTED
N GONORRHOEA DNA SPEC QL NAA+PROBE: NOT DETECTED

## 2022-07-05 LAB
FINAL PATHOLOGIC DIAGNOSIS: NORMAL
Lab: NORMAL

## 2022-07-06 ENCOUNTER — PATIENT MESSAGE (OUTPATIENT)
Dept: OBSTETRICS AND GYNECOLOGY | Facility: CLINIC | Age: 32
End: 2022-07-06
Payer: COMMERCIAL

## 2022-07-07 ENCOUNTER — PATIENT MESSAGE (OUTPATIENT)
Dept: OBSTETRICS AND GYNECOLOGY | Facility: CLINIC | Age: 32
End: 2022-07-07
Payer: COMMERCIAL

## 2022-07-07 LAB
BACTERIAL VAGINOSIS DNA: POSITIVE
CANDIDA GLABRATA DNA: NEGATIVE
CANDIDA KRUSEI DNA: NEGATIVE
CANDIDA RRNA VAG QL PROBE: NEGATIVE
HPV HR 12 DNA SPEC QL NAA+PROBE: NEGATIVE
HPV16 AG SPEC QL: NEGATIVE
HPV18 DNA SPEC QL NAA+PROBE: NEGATIVE
T VAGINALIS RRNA GENITAL QL PROBE: NEGATIVE

## 2022-07-07 RX ORDER — METRONIDAZOLE 500 MG/1
500 TABLET ORAL EVERY 12 HOURS
Qty: 14 TABLET | Refills: 0 | Status: SHIPPED | OUTPATIENT
Start: 2022-07-07 | End: 2022-07-14

## 2022-07-08 ENCOUNTER — PATIENT MESSAGE (OUTPATIENT)
Dept: OBSTETRICS AND GYNECOLOGY | Facility: CLINIC | Age: 32
End: 2022-07-08
Payer: COMMERCIAL

## 2022-07-11 ENCOUNTER — PATIENT MESSAGE (OUTPATIENT)
Dept: OBSTETRICS AND GYNECOLOGY | Facility: CLINIC | Age: 32
End: 2022-07-11
Payer: COMMERCIAL

## 2022-07-23 ENCOUNTER — PATIENT MESSAGE (OUTPATIENT)
Dept: OBSTETRICS AND GYNECOLOGY | Facility: CLINIC | Age: 32
End: 2022-07-23
Payer: COMMERCIAL

## 2022-09-13 ENCOUNTER — PATIENT MESSAGE (OUTPATIENT)
Dept: FAMILY MEDICINE | Facility: HOSPITAL | Age: 32
End: 2022-09-13

## 2022-09-13 ENCOUNTER — OFFICE VISIT (OUTPATIENT)
Dept: FAMILY MEDICINE | Facility: HOSPITAL | Age: 32
End: 2022-09-13
Payer: COMMERCIAL

## 2022-09-13 VITALS
WEIGHT: 227.31 LBS | DIASTOLIC BLOOD PRESSURE: 87 MMHG | HEIGHT: 62 IN | BODY MASS INDEX: 41.83 KG/M2 | HEART RATE: 73 BPM | SYSTOLIC BLOOD PRESSURE: 131 MMHG

## 2022-09-13 DIAGNOSIS — I88.9 CERVICAL LYMPHADENITIS: Primary | ICD-10-CM

## 2022-09-13 PROCEDURE — 99213 OFFICE O/P EST LOW 20 MIN: CPT

## 2022-09-13 RX ORDER — IBUPROFEN 800 MG/1
800 TABLET ORAL 3 TIMES DAILY PRN
Qty: 40 TABLET | Refills: 0 | Status: SHIPPED | OUTPATIENT
Start: 2022-09-13 | End: 2023-02-16

## 2022-09-13 NOTE — PROGRESS NOTES
"\[facility]\"" Family Medicine  History & Physical    SUBJECTIVE:     Chief Complaint:   No chief complaint on file.      History of Present Illness:  31 y.o. female who  has no past medical history on file. presents to clinic today for neck pain. Neck pain started 1-2 weeks ago. Neck pain has occurred in the past and could be due to seasonal changes. Patient notices this happening every time the season changes from summer to fall. Patient denies congestion, cough, sneezing, or sinus. Patient feels like she can "feel a lump enlarges around her neck and then will go away". No history of thyroid cancer in family. Patient denies recent fevers or infection. Patient has a history of recurrent cervical lymphadenitis with prior surgery in 2005. Biopsy obtained at that time revealed no malignancy.       Allergies:  Review of patient's allergies indicates:   Allergen Reactions    Penicillins Itching     Hives as well. Patient stated she experienced this reaction when she had surgery in 2014.       Home Medications:  No current outpatient medications on file prior to visit.     Current Facility-Administered Medications on File Prior to Visit   Medication    medroxyPROGESTERone (DEPO-PROVERA) injection 150 mg       No past medical history on file.  Past Surgical History:   Procedure Laterality Date    fibroid removal      LYMPH NODE BIOPSY      OVARIAN CYST REMOVAL       Family History   Problem Relation Age of Onset    No Known Problems Father     No Known Problems Sister     No Known Problems Brother     Breast cancer Neg Hx     Ovarian cancer Neg Hx      Social History     Tobacco Use    Smoking status: Never    Smokeless tobacco: Never   Substance Use Topics    Alcohol use: No    Drug use: No        ROS     OBJECTIVE:     Vital Signs:  Pulse: 73 (09/13/22 1055)  BP: 131/87 (09/13/22 1055)    Physical Exam  Constitutional:       General: She is not in acute distress.     Appearance: She is not ill-appearing.   HENT:      Head: " Normocephalic.      Right Ear: Tympanic membrane, ear canal and external ear normal.      Left Ear: Tympanic membrane, ear canal and external ear normal.      Nose: No congestion.      Mouth/Throat:      Pharynx: No oropharyngeal exudate or posterior oropharyngeal erythema.   Eyes:      Extraocular Movements: Extraocular movements intact.      Pupils: Pupils are equal, round, and reactive to light.   Neck:      Comments: Non-tender to palpation along lymphatic chain on neck bilaterally.   Cardiovascular:      Rate and Rhythm: Normal rate and regular rhythm.      Heart sounds: No murmur heard.    No friction rub. No gallop.   Pulmonary:      Breath sounds: No wheezing, rhonchi or rales.   Abdominal:      General: There is no distension.      Tenderness: There is no abdominal tenderness.   Musculoskeletal:         General: Normal range of motion.      Cervical back: Normal range of motion and neck supple. No rigidity or tenderness.      Right lower leg: No edema.      Left lower leg: No edema.      Comments:     Lymphadenopathy:      Cervical: No cervical adenopathy.   Skin:     General: Skin is warm.   Neurological:      Mental Status: She is oriented to person, place, and time.   Psychiatric:         Mood and Affect: Mood normal.       Laboratory:  No results found for: HGBA1C    A/P:  Diagnoses and all orders for this visit:    Cervical lymphadenitis  -     CBC Auto Differential; Future  -     Comprehensive Metabolic Panel; Future  -     TSH; Future  -     ibuprofen (ADVIL,MOTRIN) 800 MG tablet; Take 1 tablet (800 mg total) by mouth 3 (three) times daily as needed for Pain (neck pain).    Patient with symptoms of likely reactive cervical lymphadenitis either 2/2 to recent infection or seasonal change. Due to patient history of non-malignancy of lymph nodes, likely to be benign in nature. Will continue to monitor and treat conservatively at this time. If symptoms progress or lymph enlargement continues or patient  endorses B-cell symptoms, will further investigate with US at next follow up.    Follow up in 3-6 months or PRN     Oscar Menard MD PGY-2  [facility]       This note was partially created using [employer] Voice Recognition software. Typographical and content errors may occur with this process. While efforts are made to detect and correct such errors, in some cases errors will persist. For this reason, wording in this document should be considered in the proper context and not strictly verbatim

## 2022-09-19 ENCOUNTER — CLINICAL SUPPORT (OUTPATIENT)
Dept: OBSTETRICS AND GYNECOLOGY | Facility: CLINIC | Age: 32
End: 2022-09-19
Payer: COMMERCIAL

## 2022-09-19 DIAGNOSIS — Z30.42 SURVEILLANCE FOR DEPO-PROVERA CONTRACEPTION: Primary | ICD-10-CM

## 2022-09-19 PROCEDURE — 96372 PR INJECTION,THERAP/PROPH/DIAG2ST, IM OR SUBCUT: ICD-10-PCS | Mod: S$GLB,,, | Performed by: OBSTETRICS & GYNECOLOGY

## 2022-09-19 PROCEDURE — 99999 PR PBB SHADOW E&M-EST. PATIENT-LVL I: CPT | Mod: PBBFAC,,,

## 2022-09-19 PROCEDURE — 96372 THER/PROPH/DIAG INJ SC/IM: CPT | Mod: S$GLB,,, | Performed by: OBSTETRICS & GYNECOLOGY

## 2022-09-19 PROCEDURE — 99999 PR PBB SHADOW E&M-EST. PATIENT-LVL I: ICD-10-PCS | Mod: PBBFAC,,,

## 2022-09-19 RX ADMIN — MEDROXYPROGESTERONE ACETATE 150 MG: 150 INJECTION, SUSPENSION INTRAMUSCULAR at 09:09

## 2022-09-19 NOTE — PROGRESS NOTES
150 mg Depo-Provera given RUOQG. Patient has been receiving Depo with no complications. Depo sheet given. Next Depo due 12/05/22-12/19/22.  Appointment made for 12/19/22 @ 08:00 AM. Patient advised to wait 15 min without signs/symptoms. Patient verbalized understanding. Patient tolerated well.

## 2022-10-07 ENCOUNTER — PATIENT MESSAGE (OUTPATIENT)
Dept: FAMILY MEDICINE | Facility: HOSPITAL | Age: 32
End: 2022-10-07
Payer: COMMERCIAL

## 2022-10-16 NOTE — PROGRESS NOTES
I assume primary medical responsibility for this patient. I have reviewed the history, physical, and assessement & treatment plan with the resident and agree that the care is reasonable and necessary. This service has been performed by a resident without the presence of a teaching physician under the primary care exception. If necessary, an addendum of additional findings or evaluation beyond the resident documentation will be noted below.      Holly Gutierrez MD    [facility]

## 2022-12-16 ENCOUNTER — TELEPHONE (OUTPATIENT)
Dept: OBSTETRICS AND GYNECOLOGY | Facility: CLINIC | Age: 32
End: 2022-12-16
Payer: COMMERCIAL

## 2022-12-16 NOTE — TELEPHONE ENCOUNTER
Good Morning Evelin Hughes is coming Monday morning at 08:00 AM for her depo injection. Her last annual was on 6/29/22. Do you mind signing the pended depo order when you have a chance?    Thanks in advance,    -Ana

## 2022-12-19 ENCOUNTER — CLINICAL SUPPORT (OUTPATIENT)
Dept: OBSTETRICS AND GYNECOLOGY | Facility: CLINIC | Age: 32
End: 2022-12-19
Payer: COMMERCIAL

## 2022-12-19 ENCOUNTER — TELEPHONE (OUTPATIENT)
Dept: OBSTETRICS AND GYNECOLOGY | Facility: CLINIC | Age: 32
End: 2022-12-19
Payer: COMMERCIAL

## 2022-12-19 DIAGNOSIS — Z30.42 SURVEILLANCE FOR DEPO-PROVERA CONTRACEPTION: Primary | ICD-10-CM

## 2022-12-19 PROCEDURE — 96372 THER/PROPH/DIAG INJ SC/IM: CPT | Mod: S$GLB,,, | Performed by: OBSTETRICS & GYNECOLOGY

## 2022-12-19 PROCEDURE — 96372 PR INJECTION,THERAP/PROPH/DIAG2ST, IM OR SUBCUT: ICD-10-PCS | Mod: S$GLB,,, | Performed by: OBSTETRICS & GYNECOLOGY

## 2022-12-19 RX ORDER — MEDROXYPROGESTERONE ACETATE 150 MG/ML
150 INJECTION, SUSPENSION INTRAMUSCULAR
Status: SHIPPED | OUTPATIENT
Start: 2022-12-19 | End: 2023-09-15

## 2022-12-19 RX ADMIN — MEDROXYPROGESTERONE ACETATE 150 MG: 150 INJECTION, SUSPENSION INTRAMUSCULAR at 01:12

## 2022-12-19 NOTE — PROGRESS NOTES
150 mg Depo-Provera given LUOQG. Patient has been receiving Depo with no complications. Depo sheet given. Next Depo due 03/06/23-03/20/23.  Appointment made for 03/06/23 @ 08:00 AM. Patient advised to wait 15 min without signs/symptoms. Patient verbalized understanding. Patient tolerated well.

## 2022-12-19 NOTE — TELEPHONE ENCOUNTER
Called patient to reschedule her missed depo injection appointment from this morning at 08:00 AM. Patient stated that she is at work and would like to come when she gets off. Patient rescheduled for this afternoon at 1:30 PM. Patient verbalized understanding. No other questions or concerns.

## 2023-02-16 ENCOUNTER — OFFICE VISIT (OUTPATIENT)
Dept: URGENT CARE | Facility: CLINIC | Age: 33
End: 2023-02-16
Payer: COMMERCIAL

## 2023-02-16 VITALS
HEIGHT: 62 IN | HEART RATE: 84 BPM | DIASTOLIC BLOOD PRESSURE: 80 MMHG | SYSTOLIC BLOOD PRESSURE: 121 MMHG | RESPIRATION RATE: 18 BRPM | BODY MASS INDEX: 41.77 KG/M2 | TEMPERATURE: 99 F | WEIGHT: 227 LBS | OXYGEN SATURATION: 98 %

## 2023-02-16 DIAGNOSIS — H65.193 ACUTE EFFUSION OF BOTH MIDDLE EARS: ICD-10-CM

## 2023-02-16 DIAGNOSIS — J06.9 VIRAL URI WITH COUGH: Primary | ICD-10-CM

## 2023-02-16 DIAGNOSIS — J02.9 SORE THROAT: ICD-10-CM

## 2023-02-16 LAB
CTP QC/QA: YES
SARS-COV-2 AG RESP QL IA.RAPID: NEGATIVE

## 2023-02-16 PROCEDURE — 87811 SARS CORONAVIRUS 2 ANTIGEN POCT, MANUAL READ: ICD-10-PCS | Mod: QW,S$GLB,, | Performed by: PHYSICIAN ASSISTANT

## 2023-02-16 PROCEDURE — 99213 PR OFFICE/OUTPT VISIT, EST, LEVL III, 20-29 MIN: ICD-10-PCS | Mod: S$GLB,,, | Performed by: PHYSICIAN ASSISTANT

## 2023-02-16 PROCEDURE — 99213 OFFICE O/P EST LOW 20 MIN: CPT | Mod: S$GLB,,, | Performed by: PHYSICIAN ASSISTANT

## 2023-02-16 PROCEDURE — 87811 SARS-COV-2 COVID19 W/OPTIC: CPT | Mod: QW,S$GLB,, | Performed by: PHYSICIAN ASSISTANT

## 2023-02-16 RX ORDER — BENZONATATE 200 MG/1
200 CAPSULE ORAL 3 TIMES DAILY PRN
Qty: 30 CAPSULE | Refills: 0 | Status: SHIPPED | OUTPATIENT
Start: 2023-02-16 | End: 2023-02-26

## 2023-02-16 RX ORDER — FLUTICASONE PROPIONATE 50 MCG
1 SPRAY, SUSPENSION (ML) NASAL DAILY
Qty: 16 G | Refills: 0 | Status: SHIPPED | OUTPATIENT
Start: 2023-02-16 | End: 2023-02-23

## 2023-02-16 RX ORDER — CETIRIZINE HYDROCHLORIDE 10 MG/1
10 TABLET ORAL DAILY
Qty: 30 TABLET | Refills: 0 | Status: SHIPPED | OUTPATIENT
Start: 2023-02-16 | End: 2023-03-16

## 2023-02-16 NOTE — PATIENT INSTRUCTIONS
"                                                         URI   If your condition worsens or fails to improve we recommend that you receive another evaluation at the urgent care/ER immediately or contact your PCP to discuss your concerns. You must understand that you've received an urgent care treatment only and that you may be released before all your medical problems are known or treated. You the patient will arrange for follouwp care as instructed.   If we discussed that I think your illness is viral, it will not respond to antibiotics and will last 10-14 days.   Tessalon for cough as needed.  Flonase (fluticasone) is a nasal spray which is available over the counter and may help with your symptoms.   Zyrtec D, Claritin D or Allegra D can also help with symptoms of congestion and drainage.   If you have hypertension avoid using the "D" which is the decongestant   If you just have drainage you can take plain zyrtec, claritin or allegra   If you just have a congested feeling you can take pseudoephedrine (unless you have high blood pressure) which you have to sign for behind the counter.   Rest and fluids are also important.     Salt water gargles, warm tea with honey and chloraseptic spray as needed for sore throat.   Tylenol or ibuprofen can also be used as directed for pain unless you have an allergy to them or medical condition such as stomach ulcers, kidney or liver disease or blood thinners etc for which you should not be taking these type of medications.         "

## 2023-02-16 NOTE — LETTER
February 16, 2023      [facility]  [address] [address]  Phone: [phone]  Fax: [phone]       Patient: Evelin Taylor   YOB: 1990  Date of Visit: 02/16/2023    To Whom It May Concern:    Chacho Taylor  was at [facility] on 02/16/2023. The patient may return to work/school on 02/19/202023 with no restrictions. If you have any questions or concerns, or if I can be of further assistance, please do not hesitate to contact me.    Sincerely,      Zhanna Lanier PA-C

## 2023-02-16 NOTE — PROGRESS NOTES
"Subjective:       Patient ID: Evelin Taylor is a 32 y.o. female.    Vitals:  height is 5' 2" (1.575 m) and weight is 103 kg (227 lb). Her temperature is 98.7 °F (37.1 °C). Her blood pressure is 121/80 and her pulse is 84. Her respiration is 18 and oxygen saturation is 98%.     Chief Complaint: Sore Throat    Patient presents to the clinic with a sore throat, congestion, cough x yesterday      Sore Throat   This is a new problem. The current episode started yesterday. Associated symptoms include congestion, coughing, a plugged ear sensation, swollen glands and trouble swallowing. Pertinent negatives include no abdominal pain, diarrhea, drooling, ear discharge, ear pain, headaches, neck pain, shortness of breath or vomiting. Treatments tried: nyquil, dayquil. The treatment provided mild relief.     Constitution: Negative for appetite change, chills, sweating, fatigue, fever and unexpected weight change.   HENT:  Positive for congestion, postnasal drip, sore throat and trouble swallowing. Negative for ear pain, ear discharge, dental problem, drooling, mouth sores, tongue pain, sinus pain, sinus pressure and voice change.    Neck: Negative for neck pain and neck stiffness.   Cardiovascular:  Negative for chest pain and sob on exertion.   Eyes:  Negative for eye discharge and eye itching.   Respiratory:  Positive for cough. Negative for chest tightness, sputum production, shortness of breath and wheezing.    Gastrointestinal:  Negative for abdominal pain, nausea, vomiting, constipation and diarrhea.   Musculoskeletal:  Positive for pain and muscle ache. Negative for muscle cramps.   Skin:  Negative for color change, pale and rash.   Neurological:  Negative for dizziness, headaches, disorientation and altered mental status.   Psychiatric/Behavioral:  Negative for altered mental status, disorientation and confusion.    History reviewed. No pertinent past medical history.    Past Surgical History:   Procedure " Laterality Date    fibroid removal      LYMPH NODE BIOPSY      OVARIAN CYST REMOVAL         Family History   Problem Relation Age of Onset    No Known Problems Father     No Known Problems Sister     No Known Problems Brother     Breast cancer Neg Hx     Ovarian cancer Neg Hx        Social History     Socioeconomic History    Marital status:    Tobacco Use    Smoking status: Never    Smokeless tobacco: Never   Substance and Sexual Activity    Alcohol use: No    Drug use: No    Sexual activity: Yes     Partners: Male     Birth control/protection: None       Current Outpatient Medications   Medication Sig Dispense Refill    benzonatate (TESSALON) 200 MG capsule Take 1 capsule (200 mg total) by mouth 3 (three) times daily as needed for Cough. 30 capsule 0    cetirizine (ZYRTEC) 10 MG tablet Take 1 tablet (10 mg total) by mouth once daily. 30 tablet 0    fluticasone propionate (FLONASE) 50 mcg/actuation nasal spray 1 spray (50 mcg total) by Each Nostril route once daily. for 7 days 16 g 0     Current Facility-Administered Medications   Medication Dose Route Frequency Provider Last Rate Last Admin    medroxyPROGESTERone (DEPO-PROVERA) injection 150 mg  150 mg Intramuscular Q90 Days Rashmi Alfonso MD   150 mg at 09/19/22 0905    medroxyPROGESTERone (DEPO-PROVERA) injection 150 mg  150 mg Intramuscular Q90 Days Rashmi Alfonso MD   150 mg at 12/19/22 1325       Review of patient's allergies indicates:   Allergen Reactions    Penicillins Itching     Hives as well. Patient stated she experienced this reaction when she had surgery in 2014.         Objective:      Physical Exam   Constitutional: She is oriented to person, place, and time. She appears well-developed. She is cooperative.  Non-toxic appearance. She does not appear ill. No distress.   HENT:   Head: Normocephalic and atraumatic.   Ears:   Right Ear: Hearing, external ear and ear canal normal. Tympanic membrane is not injected, not erythematous, not retracted and  not bulging. A middle ear effusion is present. impacted cerumen  Left Ear: Hearing, external ear and ear canal normal. Tympanic membrane is not injected, not erythematous, not retracted and not bulging. A middle ear effusion is present. impacted cerumen  Nose: Rhinorrhea and congestion present. No mucosal edema or nasal deformity. No epistaxis. Right sinus exhibits no maxillary sinus tenderness and no frontal sinus tenderness. Left sinus exhibits no maxillary sinus tenderness and no frontal sinus tenderness.   Mouth/Throat: Uvula is midline and mucous membranes are normal. Mucous membranes are moist. No trismus in the jaw. Normal dentition. No uvula swelling. Posterior oropharyngeal erythema present. No oropharyngeal exudate or posterior oropharyngeal edema.   Eyes: Conjunctivae and lids are normal. Right eye exhibits no discharge. Left eye exhibits no discharge. No scleral icterus.   Neck: Trachea normal and phonation normal. Neck supple. No edema present. No erythema present. No neck rigidity present.   Cardiovascular: Normal rate, regular rhythm, normal heart sounds and normal pulses.   No murmur heard.Exam reveals no gallop.   Pulmonary/Chest: Effort normal and breath sounds normal. No stridor. No respiratory distress. She has no decreased breath sounds. She has no wheezes. She has no rhonchi. She has no rales.   Abdominal: Normal appearance.   Musculoskeletal:      Cervical back: She exhibits tenderness.   Lymphadenopathy:     She has cervical adenopathy.   Neurological: She is alert and oriented to person, place, and time. She exhibits normal muscle tone. Coordination normal.   Skin: Skin is warm, dry, intact, not diaphoretic, not pale and no rash.   Psychiatric: Her speech is normal and behavior is normal. Mood, judgment and thought content normal.   Nursing note and vitals reviewed.  Results for orders placed or performed in visit on 02/16/23   SARS Coronavirus 2 Antigen, POCT Manual Read   Result Value Ref  "Range    SARS Coronavirus 2 Antigen Negative Negative     Acceptable Yes            Assessment:       1. Viral URI with cough    2. Sore throat    3. Acute effusion of both middle ears          Plan:       Results reviewed  Viral URI with cough  -     benzonatate (TESSALON) 200 MG capsule; Take 1 capsule (200 mg total) by mouth 3 (three) times daily as needed for Cough.  Dispense: 30 capsule; Refill: 0  -     fluticasone propionate (FLONASE) 50 mcg/actuation nasal spray; 1 spray (50 mcg total) by Each Nostril route once daily. for 7 days  Dispense: 16 g; Refill: 0    Sore throat  -     SARS Coronavirus 2 Antigen, POCT Manual Read    Acute effusion of both middle ears  -     cetirizine (ZYRTEC) 10 MG tablet; Take 1 tablet (10 mg total) by mouth once daily.  Dispense: 30 tablet; Refill: 0              Patient Instructions                                                            URI   If your condition worsens or fails to improve we recommend that you receive another evaluation at the urgent care/ER immediately or contact your PCP to discuss your concerns. You must understand that you've received an urgent care treatment only and that you may be released before all your medical problems are known or treated. [facility] the [facility].   If we discussed that I think your illness is viral, it will not respond to antibiotics and will last 10-14 days.   Tessalon for cough as needed.  Flonase (fluticasone) is a nasal spray which is available over the counter and may help with your symptoms.   Zyrtec D, Claritin D or Allegra D can also help with symptoms of congestion and drainage.   If you have hypertension avoid using the "D" which is the decongestant   If you just have drainage you can take plain zyrtec, claritin or allegra   If you just have a congested feeling you can take pseudoephedrine (unless you have high blood pressure) which you have to sign for behind the " counter.   Rest and fluids are also important.     Salt water gargles, warm tea with honey and chloraseptic spray as needed for sore throat.   Tylenol or ibuprofen can also be used as directed for pain unless you have an allergy to them or medical condition such as stomach ulcers, kidney or liver disease or blood thinners etc for which you should not be taking these type of medications.

## 2023-03-16 ENCOUNTER — OFFICE VISIT (OUTPATIENT)
Dept: FAMILY MEDICINE | Facility: HOSPITAL | Age: 33
End: 2023-03-16
Payer: COMMERCIAL

## 2023-03-16 VITALS
BODY MASS INDEX: 43.32 KG/M2 | HEIGHT: 62 IN | HEART RATE: 85 BPM | WEIGHT: 235.44 LBS | SYSTOLIC BLOOD PRESSURE: 125 MMHG | DIASTOLIC BLOOD PRESSURE: 86 MMHG

## 2023-03-16 DIAGNOSIS — N62 LARGE BREASTS: ICD-10-CM

## 2023-03-16 DIAGNOSIS — D21.9 FIBROID: ICD-10-CM

## 2023-03-16 DIAGNOSIS — Z30.42 DEPO-PROVERA CONTRACEPTIVE STATUS: Primary | ICD-10-CM

## 2023-03-16 PROCEDURE — 99214 OFFICE O/P EST MOD 30 MIN: CPT | Performed by: STUDENT IN AN ORGANIZED HEALTH CARE EDUCATION/TRAINING PROGRAM

## 2023-03-16 RX ORDER — MEDROXYPROGESTERONE ACETATE 150 MG/ML
150 INJECTION, SUSPENSION INTRAMUSCULAR
Qty: 1 ML | Refills: 0 | Status: SHIPPED | OUTPATIENT
Start: 2023-03-16 | End: 2112-11-25

## 2023-03-16 NOTE — PROGRESS NOTES
"            [facility]               Clinic H&P    Subjective                                                                                                                                                                           Chief Complaint: back pain/depo shot    Evelin Taylor is a 32 y.o. female who  has a past medical history of Fibroid (3/17/2023). The patient presents to clinic for   HPI     Depo shot  - patient received depo shot q3 months in the OBGYN suite last received on 12/19/2022  - patient reports needing to receive new dose  - currently sexually active  - overdue for UPT    Back pain  - back pain with shoulder pain  - wears 44D bra size  - wears sports bras mainly with sometimes wired bras    Fibroids  - reports hx of fibroids  - reports found on US previously, followed by OBGYN  - patient reports having increased abdominal pain and concern for fibroid growth  - requesting a new US   - requesting referral to OBGYN at this time    Health Maintenance   Topic Date Due    Hepatitis C Screening  Never done    Lipid Panel  Never done    TETANUS VACCINE  Never done        Review of Systems   Constitutional:  Negative for activity change and fever.   HENT:  Negative for drooling.    Respiratory:  Negative for cough and choking.    Gastrointestinal:  Positive for abdominal pain.   Genitourinary:  Negative for decreased urine volume, menstrual problem and urgency.   Musculoskeletal:  Positive for back pain.      Objective                                                                                                                                                                             Vitals:    03/16/23 1443   BP: 125/86   Pulse: 85   Weight: 106.8 kg (235 lb 7.2 oz)   Height: 5' 2" (1.575 m)      Body mass index is 43.06 kg/m².    Physical Exam  Exam conducted with a chaperone present.   Constitutional:       Appearance: She is obese.   HENT:      Head: Normocephalic and " atraumatic.   Cardiovascular:      Rate and Rhythm: Normal rate and regular rhythm.   Pulmonary:      Effort: Pulmonary effort is normal. No respiratory distress.      Breath sounds: No stridor.   Chest:   Breasts:     Jann Score is 5.      Breasts are symmetrical.      Right: Normal. No swelling, bleeding, inverted nipple, mass, nipple discharge, skin change or tenderness.      Left: Normal. No swelling, bleeding, inverted nipple, mass, nipple discharge, skin change or tenderness.      Comments: Bilateral bra strap sign  Lymphadenopathy:      Upper Body:      Right upper body: No axillary adenopathy.      Left upper body: No axillary adenopathy.   Neurological:      Mental Status: She is alert.       Laboratory:  Lab Results   Component Value Date    WBC 3.69 (L) 09/13/2022    HGB 12.8 09/13/2022    HCT 37.7 09/13/2022    MCV 87 09/13/2022     09/13/2022       Chemistry        Component Value Date/Time     09/13/2022 1201    K 3.9 09/13/2022 1201     09/13/2022 1201    CO2 26 09/13/2022 1201    BUN 10 09/13/2022 1201    CREATININE 0.7 09/13/2022 1201    GLU 82 09/13/2022 1201        Component Value Date/Time    CALCIUM 9.6 09/13/2022 1201    ALKPHOS 76 09/13/2022 1201    AST 15 09/13/2022 1201    ALT 12 09/13/2022 1201    BILITOT 0.5 09/13/2022 1201    ESTGFRAFRICA >60 05/24/2018 1147    EGFRNONAA >60 05/24/2018 1147          No results found for: MG, PHOS  No results found for: CHOL, HDL, LDLCALC, TRIG  The ASCVD Risk score (Catie DK, et al., 2019) failed to calculate for the following reasons:    The 2019 ASCVD risk score is only valid for ages 40 to 79       No results found for: LDLCALC      Lab Results   Component Value Date    TSH 0.960 09/13/2022     No results found for: HGBA1C      Assessment/Plan                                                                                                                                                                Evelin Taylor is a 32  y.o. female who presents to clinic with:    1. Depo-Provera contraceptive status    2. Fibroid    3. Large breasts       Patient would benefit from plastic surgery evaluation. Educated on importance of proper bra sizing and wearing  Depo ordered, will receive shot in clinic. UPT negative  Fibroid US ordered, OBGYN referral placed, 2/2 to patients request.     Problem List Items Addressed This Visit          Oncology    Fibroid    Relevant Orders    US Transvaginal Non OB    Ambulatory referral/consult to Obstetrics / Gynecology     Other Visit Diagnoses       Depo-Provera contraceptive status    -  Primary    Relevant Medications    medroxyPROGESTERone (DEPO-PROVERA) 150 mg/mL Syrg    Other Relevant Orders    POCT Urine Pregnancy    Large breasts        Relevant Orders    Ambulatory referral/consult to Plastic Surgery            Medication List with Changes/Refills   New Medications    MEDROXYPROGESTERONE (DEPO-PROVERA) 150 MG/ML SYRG    Inject 1 mL (150 mg total) into the muscle every 3 (three) months. for 365 doses   Discontinued Medications    CETIRIZINE (ZYRTEC) 10 MG TABLET    Take 1 tablet (10 mg total) by mouth once daily.       Patient counseled about the importance of healthy dietary habits as well as routine physical activity and exercise for better health outcomes.    The patient's diagnosis, medications, and proper use of medications were dicussed. The importance of close follow up to discuss labs, modify medications, and monitor any potential side effects was also discussed. The patient was also informed of the importance of any future cancer screening.     No follow-ups on file.     Patient expressed understanding after counseling regarding diagnosis and recommendations.    A total of 24 minutes was spent on patient care during this encounter which included chart review, examining the patient, formulating a treatment plan and documentation.        Medical decision making straight forward and not  complex during this visit.     Case discussed with staff, Dr. Rebecca Jones MD  [facility] HO-3

## 2023-03-17 PROBLEM — D21.9 FIBROID: Status: ACTIVE | Noted: 2023-03-17

## 2023-03-28 ENCOUNTER — OFFICE VISIT (OUTPATIENT)
Dept: PODIATRY | Facility: CLINIC | Age: 33
End: 2023-03-28
Payer: COMMERCIAL

## 2023-03-28 VITALS
HEIGHT: 62 IN | RESPIRATION RATE: 18 BRPM | DIASTOLIC BLOOD PRESSURE: 80 MMHG | WEIGHT: 231.69 LBS | BODY MASS INDEX: 42.63 KG/M2 | SYSTOLIC BLOOD PRESSURE: 118 MMHG

## 2023-03-28 DIAGNOSIS — M76.822 POSTERIOR TIBIALIS TENDINITIS OF BOTH LOWER EXTREMITIES: Primary | ICD-10-CM

## 2023-03-28 DIAGNOSIS — M79.672 FOOT PAIN, BILATERAL: Primary | ICD-10-CM

## 2023-03-28 DIAGNOSIS — M24.573 EQUINUS CONTRACTURE OF ANKLE: ICD-10-CM

## 2023-03-28 DIAGNOSIS — M79.671 FOOT PAIN, BILATERAL: Primary | ICD-10-CM

## 2023-03-28 DIAGNOSIS — M25.579 SINUS TARSI SYNDROME, UNSPECIFIED LATERALITY: ICD-10-CM

## 2023-03-28 DIAGNOSIS — M76.821 POSTERIOR TIBIALIS TENDINITIS OF BOTH LOWER EXTREMITIES: Primary | ICD-10-CM

## 2023-03-28 PROCEDURE — 99203 PR OFFICE/OUTPT VISIT, NEW, LEVL III, 30-44 MIN: ICD-10-PCS | Mod: 25,S$GLB,, | Performed by: PODIATRIST

## 2023-03-28 PROCEDURE — 20605 INTERMEDIATE JOINT ASPIRATION/INJECTION: ICD-10-PCS | Mod: 50,S$GLB,, | Performed by: PODIATRIST

## 2023-03-28 PROCEDURE — 20605 DRAIN/INJ JOINT/BURSA W/O US: CPT | Mod: 50,S$GLB,, | Performed by: PODIATRIST

## 2023-03-28 PROCEDURE — 99999 PR PBB SHADOW E&M-EST. PATIENT-LVL III: ICD-10-PCS | Mod: PBBFAC,,, | Performed by: PODIATRIST

## 2023-03-28 PROCEDURE — 99999 PR PBB SHADOW E&M-EST. PATIENT-LVL III: CPT | Mod: PBBFAC,,, | Performed by: PODIATRIST

## 2023-03-28 PROCEDURE — 99203 OFFICE O/P NEW LOW 30 MIN: CPT | Mod: 25,S$GLB,, | Performed by: PODIATRIST

## 2023-03-28 RX ORDER — DEXAMETHASONE SODIUM PHOSPHATE 4 MG/ML
4 INJECTION, SOLUTION INTRA-ARTICULAR; INTRALESIONAL; INTRAMUSCULAR; INTRAVENOUS; SOFT TISSUE
Status: DISCONTINUED | OUTPATIENT
Start: 2023-03-28 | End: 2023-03-28 | Stop reason: HOSPADM

## 2023-03-28 RX ADMIN — DEXAMETHASONE SODIUM PHOSPHATE 4 MG: 4 INJECTION, SOLUTION INTRA-ARTICULAR; INTRALESIONAL; INTRAMUSCULAR; INTRAVENOUS; SOFT TISSUE at 02:03

## 2023-03-28 NOTE — PATIENT INSTRUCTIONS
Posterior Tibial Tendon Dysfunction (PTTD)      What Is PTTD?   Posterior tibial tendon dysfunction (PTTD)  The posterior tibial tendon serves as one of the major supporting structures of the foot, helping it to function while walking. Posterior tibial tendon dysfunction (PTTD) is a condition caused by changes in the tendon, impairing its ability to support the arch. This results in flattening of the foot.              PTTD is often called adult acquired flatfoot because it is the most common type of flatfoot developed during adulthood. Although this condition typically occurs in only one foot, some people may develop it in both feet. PTTD is usually progressive, which means it will keep getting worse, especially if it is not treated early.    Causes  Overuse of the posterior tibial tendon is often the cause of PTTD. In fact, the symptoms usually occur after activities that involve the tendon, such as running, walking, hiking or climbing stairs.        Symptoms  The symptoms of PTTD may include pain, swelling, a flattening of the arch and an inward rolling of the ankle. As the condition progresses, the symptoms will change.    For example, when PTTD initially develops, there is pain on the inside of the foot and ankle (along the course of the tendon). In addition, the area may be red, warm and swollen.    Later, as the arch begins to flatten, there may still be pain on the inside of the foot and ankle. But at this point, the foot and toes begin to turn outward and the ankle rolls inward.    As PTTD becomes more advanced, the arch flattens even more and the pain often shifts to the outside of the foot, below the ankle. The tendon has deteriorated considerably, and arthritis often develops in the foot. In more severe cases, arthritis may also develop in the ankle.    Nonsurgical Treatment  Because of the progressive nature of PTTD, early treatment is advised. If treated early enough, your symptoms may resolve without  the need for surgery, and progression of your condition can be arrested.    In contrast, untreated PTTD could leave you with an extremely flat foot, painful arthritis in the foot and ankle and increasing limitations on walking, running or other activities.    In many cases of PTTD, treatment can begin with nonsurgical approaches that may include:    Orthotic devices or bracing. To give your arch the support it needs, your foot and ankle surgeon may provide you with an ankle brace or a custom orthotic device that fits into the shoe.  Immobilization. Sometimes a short-leg cast or boot is worn to immobilize the foot and allow the tendon to heal, or you may need to completely avoid all weightbearing for a while.  Physical therapy. Ultrasound therapy and exercises may help rehabilitate the tendon and muscle following immobilization.  Medications. Nonsteroidal anti-inflammatory drugs (NSAIDs), such as ibuprofen, help reduce the pain and inflammation.  Shoe modifications. Your foot and ankle surgeon may advise changes to your shoes and may provide special inserts designed to improve arch support.     When Is Surgery Needed?  In cases of PTTD that have progressed substantially or have failed to improve with nonsurgical treatment, surgery may be required. For some advanced cases, surgery may be the only option. Your foot and ankle surgeon will determine the best approach for you.      Understanding Posterior Tibialis Tenosynovitis    The posterior tibialis tendon runs along the inside of the foot. It connects the calf muscle (posterior tibialis muscle) to bones on the inside of the foot. It helps maintain the arch of the foot. It also gives you stability when you move. Posterior tibialis tenosynovitis is when this tendon becomes inflamed or torn.     How to say it  [id number]-VI-[id number]   What causes posterior tibialis tenosynovitis?  This condition is often caused by an injury to the tendon. For instance, a fall  can tear the tendon. Overuse can also damage it. People who play sports like basketball or tennis a lot may weaken the tendon over time. Flat feet can also be a contributing factor to developing this condition.  Symptoms of posterior tibialis tenosynovitis  The symptoms of this condition include pain and swelling. The pain is usually felt near the tendon, on the inside of the foot and ankle. It often gets worse over time or with an increase in activity. Your arch may eventually fall, leading to a flat foot. Your foot may also start to turn outward.  Treatment for posterior tibialis tenosynovitis  Treatment for this condition depends on the severity of the tear. Treatment may include:  Rest. You should avoid any activities that cause pain and swelling. You may also need to limit the amount of weight you put on your injured foot.  Cold packs. Putting a cold pack on the tendon may reduce pain and swelling.  Medicine. Over-the-counter pain medicines can reduce pain and swelling.  Leg cast or walking boot. Severe tears of the posterior tibialis tendon may need to be kept from moving. These devices can help protect the tendon and reduce swelling.  Shoe insert or brace. Like a leg cast or walking boot, these devices can help protect the tendon as it heals. They may also ease pain.  Strengthening and stretching exercises. Certain exercises can help you regain strength and flexibility in your foot..  Surgery. Several surgical choices are available to fix the torn tendon or replace it. But you often do not need surgery unless your symptoms do not get better after trying other treatments for at least 6 months.     When to call your healthcare provider  Call your healthcare provider right away if you have any of these:  Fever of 100.4°F (38°C) or higher, or as directed  Pain that gets worse  Symptoms that dont get better, or get worse  New symptoms    Date Last Reviewed: 3/10/2016  © 1417-5032 [employer] [address]  [place] [address]. All rights reserved. This information is not intended as a substitute for professional medical care. Always follow your healthcare professional's instructions.

## 2023-03-28 NOTE — PROGRESS NOTES
[facility]  [address] [place] [address], [address]  [facility] [address]  Dept: [phone]  Dept Fax: [phone]    Theo George Jr., DPM     Assessment:   MDM    Coding  1. Posterior tibialis tendinitis of both lower extremities  X-Ray Calcaneus Bilateral    CANCELED: X-Ray Ankle Complete Bilateral      2. Equinus contracture of ankle        3. Sinus tarsi syndrome, unspecified laterality  Intermediate Joint Aspiration/Injection    Intermediate Joint Aspiration/Injection          Plan:     Intermediate Joint Aspiration/Injection    Date/Time: 3/28/2023 2:45 PM  Performed by: Theo George Jr., DPM  Authorized by: Theo George Jr., DPM     Consent Done?:  Yes (Verbal)  Indications:  Pain and joint swelling  Site marked: The procedure site was marked    Timeout: Prior to procedure the correct patient, procedure, and site was verified      Location:  Ankle  Ankle joint: R STJ.  Prep: Patient was prepped and draped in usual sterile fashion    Ultrasonic Guidance for needle placement: No  Needle size:  25 G  Approach:  Anterolateral  Medications:  4 mg dexAMETHasone 4 mg/mL  Patient tolerance:  Patient tolerated the procedure well with no immediate complications    Intermediate Joint Aspiration/Injection    Date/Time: 3/28/2023 2:45 PM  Performed by: Theo George Jr., DPM  Authorized by: Theo George Jr., DPM     Consent Done?:  Yes (Verbal)  Indications:  Pain and joint swelling  Site marked: The procedure site was marked    Timeout: Prior to procedure the correct patient, procedure, and site was verified      Location:  Ankle  Ankle joint: L STJ.  Prep: Patient was prepped and draped in usual sterile fashion    Ultrasonic Guidance for needle placement: No  Needle size:  25 G  Approach:  Anterolateral  Medications:  4 mg dexAMETHasone 4 mg/mL  Patient tolerance:  Patient tolerated the procedure well with no immediate complications      Evelin was seen today for foot  swelling.    Diagnoses and all orders for this visit:    Posterior tibialis tendinitis of both lower extremities  -     X-Ray Calcaneus Bilateral; Future  -     Cancel: X-Ray Ankle Complete Bilateral; Future    Equinus contracture of ankle    Sinus tarsi syndrome, unspecified laterality  -     Intermediate Joint Aspiration/Injection  -     Intermediate Joint Aspiration/Injection        -pt seen, evaluated, and managed  -dx discussed in detail. All questions/concerns addressed  -all tx options discussed. All alternatives, risks, benefits of all txs discussed  -the patient was educated about the diagnosis  -We discussed conservative care options possible including but not limited to shoe wear and/or padding, bracing/strapping, at home ROM, formal PT, medical therapy, injection therapy  - The utilization of NSAIDs can be considered but their benefit has to be tempered against the risk of GI/ concerns  - A steroid injection can be undertaken.  We did discuss the potential mechanism of action of this shot.  Understanding that multiple injections at the same anatomic site do have deleterious effects on the soft tissue.  Generic risks include: steroid flare (advised to ice if necessary), skin hypo-pgimentation (which can be permanent and unsightly), elevation of blood sugar, subcutaneous atrophy (can be permanent) and infection.   -xr/imaging on way out--> will review at nxt visit  -labs reviewed by me: ok for vgel  -implemented icing/stretching regimen  - ASO  dispensed    -rxs dispensed: none  -referrals: none  -WB: wbat      Follow up in about 4 weeks (around 4/25/2023).    Subjective:      Patient ID: Evelin Taylor is a 32 y.o. female.    Chief Complaint:   Chief Complaint   Patient presents with    Foot Swelling     Bilateral swelling        CC - foot pain: patient presents to the podiatry clinic  with complaint of  bilateral foot pain. Onset of the symptoms was several weeks ago. Precipitating event: unk.  Current symptoms include: ability to bear weight, but with some pain, stiffness, swelling and worsening symptoms after a period of activity. Aggravating factors: walking and certain shoegear. Symptoms have gradually worsened. Patient has had no prior foot problems. Evaluation to date: none. Treatment to date: avoidance of offending activity. Patients rates pain 7/10 on pain scale.      HPI    Last Podiatry Enc: Visit date not found  Last Enc w/ Me: Visit date not found    Outside reports reviewed: historical medical records.  Family hx: as below  Past Medical History:   Diagnosis Date    Fibroid 3/17/2023     Past Surgical History:   Procedure Laterality Date    fibroid removal      LYMPH NODE BIOPSY      OVARIAN CYST REMOVAL       Family History   Problem Relation Age of Onset    No Known Problems Father     No Known Problems Sister     No Known Problems Brother     Breast cancer Neg Hx     Ovarian cancer Neg Hx      Current Outpatient Medications   Medication Sig Dispense Refill    medroxyPROGESTERone (DEPO-PROVERA) 150 mg/mL Syrg Inject 1 mL (150 mg total) into the muscle every 3 (three) months. for 365 doses 1 mL 0     Current Facility-Administered Medications   Medication Dose Route Frequency Provider Last Rate Last Admin    medroxyPROGESTERone (DEPO-PROVERA) injection 150 mg  150 mg Intramuscular Q90 Days Rashmi Alfonso MD   150 mg at 09/19/22 0905    medroxyPROGESTERone (DEPO-PROVERA) injection 150 mg  150 mg Intramuscular Q90 Days Rashmi Alfonso MD   150 mg at 12/19/22 1325     Review of patient's allergies indicates:   Allergen Reactions    Penicillins Itching     Hives as well. Patient stated she experienced this reaction when she had surgery in 2014.     Social History     Socioeconomic History    Marital status:    Tobacco Use    Smoking status: Never    Smokeless tobacco: Never   Substance and Sexual Activity    Alcohol use: No    Drug use: No    Sexual activity: Yes     Partners: Male     Birth  "control/protection: None       ROS    REVIEW OF SYSTEMS: Negative as documented below as well as positive findings in bold.       Constitutional  Respiratory  Gastrointestinal  Skin   - Fever - Cough - Heartburn - Rash   - Chills - Spit blood - Nausea - Itching   - Weight Loss - Shortness of breath - Vomiting - Nail pain   - Malaise/Fatigue - Wheezing - Abdominal Pain  Wound/Ulcer   - Weight Gain   - Blood in Stool  Poor wound healing       - Diarrhea          Cardiovascular  Genitourinary  Neurological  HEENT   - Chest Pain - Dysuria - Burning Sensation of feet - Headache   - Palpitations - Hematuria - Tingling / Paresthesia - Congestion   - Pain at night in legs - Flank Pain - Dizziness - Sore Throat   - Cramping   - Tremor - Blurred Vision   - Leg Swelling   - Sensory Change - Double Vision   - Dizzy when standing   - Speech Change - Eye Redness       - Focal Weakness - Dry Eyes       - Loss of Consciousness          Endocrine  Musculoskeletal  Psychiatric   - Cold intolerance - Muscle Pain - Depression   - Heat intolerance - Neck Pain - Insomnia   - Anemia - Joint Pain - Memory Loss   -  Easy bruising, bleeding - Heel pain - Anxiety      Toe Pain        Leg/Ankle/Foot Pain         Objective:     /80 (BP Location: Right arm, Patient Position: Sitting, BP Method: X-Large (Manual))   Resp 18   Ht 5' 2" (1.575 m)   Wt 105.1 kg (231 lb 11.3 oz)   BMI 42.38 kg/m²   Vitals:    03/28/23 1520   BP: 118/80   Resp: 18   Weight: 105.1 kg (231 lb 11.3 oz)   Height: 5' 2" (1.575 m)       Physical Exam    General Appearance:   Patient appears well developed, well nourished  Patient appears stated age    Psychiatric:   Patient is oriented to time, place, and person.  Patient has appropriate mood and affect    Neck:  Trachea Midline  No visible masses    Respiratory/Ears:  No distress or labored breathing.  Able to differentiate between normal talking voice and whisper.  Able to follow commands    Eyes:  Visual Acuity " intact  Lids and conjunctivae normal. No discoloration noted.    Foot Exam  Physical Exam  Ortho Exam  Ortho/SPM Exam  Foot/Ankle Musculoskeletal Exam    B/l LE exam con't:  V:  DP 2/4, PT 2/4   CRT< 3s to all digits tested   Tibial and popliteal lymph nodes are w/o abnormality   Edema: present, varicosities: absent    N:  Patient displays normal ankle reflexes   SILT in SP/DP/T/Jackelyn/Saph distributions    Ortho: +Motor EHL/FHL/TA/GA   +TTP b/l PTT under med mal   +TTP b/l sinus tarsi    Compartments soft/compressible. No pain on passive stretch of big toe. No calf Pain.   collapsing medial longitudinal arch with wb mild but present b/l LE   +too many toes sign b/l Le   +midfoot instability noted    Derm:  skin intact, skin warm and dry, skin without ulcers or lesions, skin without induration, nails normal, no ecchymosis    Imaging / Labs:      X-Ray Ankle Complete 3 View Bilateral    Result Date: 3/28/2023  EXAM: XR ANKLE COMPLETE 3 VIEW BILATERAL CLINICAL HISTORY: Bilateral ankle pain COMPARISON:  None available Bilateral ankles 3 views FINDINGS: The joint spaces are preserved.  Normal alignment.  Calcaneal spurs bilaterally.  Negative for fracture.  No articular erosions.     No acute findings. Finalized on: 3/28/2023 3:30 PM By:  SLOAN Ball MD, MD [facility]# [id number]      2023-03-28 15:32:[id number]    [facility]    X-Ray Foot Complete 3 view Bilateral    Result Date: 3/28/2023  EXAM: XR FOOT COMPLETE 3 VIEW BILATERAL CLINICAL HISTORY: [Pain] FINDINGS: 3 views bilateral feet.  No comparison. Small bilateral plantar calcaneal spurs.  No evidence of acute fracture or dislocation.  Normal mineralization.  Soft tissues are unremarkable.      No acute findings. Finalized on: 3/28/2023 3:49 PM By:  Bar Sepulveda MD [facility]# [id number]      2023-03-28 15:51:32.861    [facility]        Note: This was dictated using a computer transcription program. Although proofread, it may contain computer transcription errors and phonetic errors. Other human  proofreading errors may also exist. Corrections may be performed at a later time. Please contact us for any clarification if needed.    Theo George DPM  [facility]

## 2023-03-29 ENCOUNTER — PATIENT MESSAGE (OUTPATIENT)
Dept: FAMILY MEDICINE | Facility: HOSPITAL | Age: 33
End: 2023-03-29

## 2023-04-03 ENCOUNTER — TELEPHONE (OUTPATIENT)
Dept: BARIATRICS | Facility: CLINIC | Age: 33
End: 2023-04-03
Payer: COMMERCIAL

## 2023-04-03 NOTE — TELEPHONE ENCOUNTER
----- Message from Estella Lewis sent at 4/3/2023  9:37 AM CDT -----  Regarding: Pt advice / Apt  Contact: Pt [phone]  Pt is calling and would like to schedule an apt with clinic. Pt is unsure about weight surgery or weightloss management. Please call

## 2023-04-03 NOTE — TELEPHONE ENCOUNTER
Spoke to pt in regard to scheduling FC phone call appt for medical wl. Pt understands this is a phone call appt .

## 2023-04-17 ENCOUNTER — TELEPHONE (OUTPATIENT)
Dept: OBSTETRICS AND GYNECOLOGY | Facility: CLINIC | Age: 33
End: 2023-04-17
Payer: COMMERCIAL

## 2023-04-17 NOTE — TELEPHONE ENCOUNTER
Called pt and informed her that the order for U/S have been faxed over to [facility]. Patient will call back and schedule a follow up with Dr. Alfonso after her ultrasound

## 2023-05-15 ENCOUNTER — TELEPHONE (OUTPATIENT)
Dept: BARIATRICS | Facility: CLINIC | Age: 33
End: 2023-05-15
Payer: COMMERCIAL

## 2023-05-15 NOTE — TELEPHONE ENCOUNTER
Notified patient of the date & time of financial phone call appt.  Pt aware appt is a telephone call.    Dashboard updated  Appt. 05.18.2023

## 2023-05-24 ENCOUNTER — OFFICE VISIT (OUTPATIENT)
Dept: PODIATRY | Facility: CLINIC | Age: 33
End: 2023-05-24
Payer: COMMERCIAL

## 2023-05-24 VITALS
SYSTOLIC BLOOD PRESSURE: 132 MMHG | BODY MASS INDEX: 41.94 KG/M2 | DIASTOLIC BLOOD PRESSURE: 85 MMHG | WEIGHT: 229.25 LBS | HEART RATE: 88 BPM

## 2023-05-24 DIAGNOSIS — M76.822 POSTERIOR TIBIALIS TENDINITIS OF BOTH LOWER EXTREMITIES: Primary | ICD-10-CM

## 2023-05-24 DIAGNOSIS — M76.821 POSTERIOR TIBIALIS TENDINITIS OF BOTH LOWER EXTREMITIES: Primary | ICD-10-CM

## 2023-05-24 DIAGNOSIS — M24.573 EQUINUS CONTRACTURE OF ANKLE: ICD-10-CM

## 2023-05-24 PROCEDURE — 99213 OFFICE O/P EST LOW 20 MIN: CPT | Mod: S$GLB,,, | Performed by: PODIATRIST

## 2023-05-24 PROCEDURE — 3008F BODY MASS INDEX DOCD: CPT | Mod: CPTII,S$GLB,, | Performed by: PODIATRIST

## 2023-05-24 PROCEDURE — 3008F PR BODY MASS INDEX (BMI) DOCUMENTED: ICD-10-PCS | Mod: CPTII,S$GLB,, | Performed by: PODIATRIST

## 2023-05-24 PROCEDURE — 99213 PR OFFICE/OUTPT VISIT, EST, LEVL III, 20-29 MIN: ICD-10-PCS | Mod: S$GLB,,, | Performed by: PODIATRIST

## 2023-05-24 PROCEDURE — 3075F PR MOST RECENT SYSTOLIC BLOOD PRESS GE 130-139MM HG: ICD-10-PCS | Mod: CPTII,S$GLB,, | Performed by: PODIATRIST

## 2023-05-24 PROCEDURE — 1159F PR MEDICATION LIST DOCUMENTED IN MEDICAL RECORD: ICD-10-PCS | Mod: CPTII,S$GLB,, | Performed by: PODIATRIST

## 2023-05-24 PROCEDURE — 1159F MED LIST DOCD IN RCRD: CPT | Mod: CPTII,S$GLB,, | Performed by: PODIATRIST

## 2023-05-24 PROCEDURE — 99999 PR PBB SHADOW E&M-EST. PATIENT-LVL III: ICD-10-PCS | Mod: PBBFAC,,, | Performed by: PODIATRIST

## 2023-05-24 PROCEDURE — 3079F DIAST BP 80-89 MM HG: CPT | Mod: CPTII,S$GLB,, | Performed by: PODIATRIST

## 2023-05-24 PROCEDURE — 3079F PR MOST RECENT DIASTOLIC BLOOD PRESSURE 80-89 MM HG: ICD-10-PCS | Mod: CPTII,S$GLB,, | Performed by: PODIATRIST

## 2023-05-24 PROCEDURE — 1160F PR REVIEW ALL MEDS BY PRESCRIBER/CLIN PHARMACIST DOCUMENTED: ICD-10-PCS | Mod: CPTII,S$GLB,, | Performed by: PODIATRIST

## 2023-05-24 PROCEDURE — 3075F SYST BP GE 130 - 139MM HG: CPT | Mod: CPTII,S$GLB,, | Performed by: PODIATRIST

## 2023-05-24 PROCEDURE — 1160F RVW MEDS BY RX/DR IN RCRD: CPT | Mod: CPTII,S$GLB,, | Performed by: PODIATRIST

## 2023-05-24 PROCEDURE — 99999 PR PBB SHADOW E&M-EST. PATIENT-LVL III: CPT | Mod: PBBFAC,,, | Performed by: PODIATRIST

## 2023-05-24 NOTE — PATIENT INSTRUCTIONS
Posterior Tibial Tendon Dysfunction (PTTD)      What Is PTTD?   Posterior tibial tendon dysfunction (PTTD)  The posterior tibial tendon serves as one of the major supporting structures of the foot, helping it to function while walking. Posterior tibial tendon dysfunction (PTTD) is a condition caused by changes in the tendon, impairing its ability to support the arch. This results in flattening of the foot.              PTTD is often called adult acquired flatfoot because it is the most common type of flatfoot developed during adulthood. Although this condition typically occurs in only one foot, some people may develop it in both feet. PTTD is usually progressive, which means it will keep getting worse, especially if it is not treated early.    Causes  Overuse of the posterior tibial tendon is often the cause of PTTD. In fact, the symptoms usually occur after activities that involve the tendon, such as running, walking, hiking or climbing stairs.        Symptoms  The symptoms of PTTD may include pain, swelling, a flattening of the arch and an inward rolling of the ankle. As the condition progresses, the symptoms will change.    For example, when PTTD initially develops, there is pain on the inside of the foot and ankle (along the course of the tendon). In addition, the area may be red, warm and swollen.    Later, as the arch begins to flatten, there may still be pain on the inside of the foot and ankle. But at this point, the foot and toes begin to turn outward and the ankle rolls inward.    As PTTD becomes more advanced, the arch flattens even more and the pain often shifts to the outside of the foot, below the ankle. The tendon has deteriorated considerably, and arthritis often develops in the foot. In more severe cases, arthritis may also develop in the ankle.    Nonsurgical Treatment  Because of the progressive nature of PTTD, early treatment is advised. If treated early enough, your symptoms may resolve without  the need for surgery, and progression of your condition can be arrested.    In contrast, untreated PTTD could leave you with an extremely flat foot, painful arthritis in the foot and ankle and increasing limitations on walking, running or other activities.    In many cases of PTTD, treatment can begin with nonsurgical approaches that may include:    Orthotic devices or bracing. To give your arch the support it needs, your foot and ankle surgeon may provide you with an ankle brace or a custom orthotic device that fits into the shoe.  Immobilization. Sometimes a short-leg cast or boot is worn to immobilize the foot and allow the tendon to heal, or you may need to completely avoid all weightbearing for a while.  Physical therapy. Ultrasound therapy and exercises may help rehabilitate the tendon and muscle following immobilization.  Medications. Nonsteroidal anti-inflammatory drugs (NSAIDs), such as ibuprofen, help reduce the pain and inflammation.  Shoe modifications. Your foot and ankle surgeon may advise changes to your shoes and may provide special inserts designed to improve arch support.     When Is Surgery Needed?  In cases of PTTD that have progressed substantially or have failed to improve with nonsurgical treatment, surgery may be required. For some advanced cases, surgery may be the only option. Your foot and ankle surgeon will determine the best approach for you.      Understanding Posterior Tibialis Tenosynovitis    The posterior tibialis tendon runs along the inside of the foot. It connects the calf muscle (posterior tibialis muscle) to bones on the inside of the foot. It helps maintain the arch of the foot. It also gives you stability when you move. Posterior tibialis tenosynovitis is when this tendon becomes inflamed or torn.     How to say it  UCF-[id number]-p-lis [id number]-o-sin-o-VI-tis   What causes posterior tibialis tenosynovitis?  This condition is often caused by an injury to the tendon. For instance, a fall  can tear the tendon. Overuse can also damage it. People who play sports like basketball or tennis a lot may weaken the tendon over time. Flat feet can also be a contributing factor to developing this condition.  Symptoms of posterior tibialis tenosynovitis  The symptoms of this condition include pain and swelling. The pain is usually felt near the tendon, on the inside of the foot and ankle. It often gets worse over time or with an increase in activity. Your arch may eventually fall, leading to a flat foot. Your foot may also start to turn outward.  Treatment for posterior tibialis tenosynovitis  Treatment for this condition depends on the severity of the tear. Treatment may include:  Rest. You should avoid any activities that cause pain and swelling. You may also need to limit the amount of weight you put on your injured foot.  Cold packs. Putting a cold pack on the tendon may reduce pain and swelling.  Medicine. Over-the-counter pain medicines can reduce pain and swelling.  Leg cast or walking boot. Severe tears of the posterior tibialis tendon may need to be kept from moving. These devices can help protect the tendon and reduce swelling.  Shoe insert or brace. Like a leg cast or walking boot, these devices can help protect the tendon as it heals. They may also ease pain.  Strengthening and stretching exercises. Certain exercises can help you regain strength and flexibility in your foot..  Surgery. Several surgical choices are available to fix the torn tendon or replace it. But you often do not need surgery unless your symptoms do not get better after trying other treatments for at least 6 months.     When to call your healthcare provider  Call your healthcare provider right away if you have any of these:  Fever of 100.4°F (38°C) or higher, or as directed  Pain that gets worse  Symptoms that dont get better, or get worse  New symptoms    Date Last Reviewed: 3/10/2016  © 2599-8494 [employer] [address]  [place] [address]. All rights reserved. This information is not intended as a substitute for professional medical care. Always follow your healthcare professional's instructions.      Equinus          What Is Equinus?    Equinus is a condition in which the upward bending motion of the ankle joint is limited. Someone with equinus lacks the flexibility to bring the top of the foot toward the front of the leg. Equinus can occur in one or both feet. When it involves both feet, the limitation of motion is sometimes worse in one foot than in the other.    People with equinus develop ways to compensate for their limited ankle motion, and this often leads to other foot, leg or back problems. The most common methods of compensation are flattening of the arch or picking up the heel early when walking, placing increased pressure on the ball of the foot. Other patients compensate by toe walking, while a smaller number take steps by bending abnormally at the hip or knee.    Causes  There are several possible causes for the limited range of ankle motion. Often, it is due to tightness in the Achilles tendon or calf muscles (the soleus muscle and/or gastrocnemius muscle). In some patients, this tightness is congenital (present at birth), and sometimes it is an inherited trait. Other patients acquire the tightness from being in a cast, being on crutches or frequently wearing high-heeled shoes. In addition, diabetes can affect the fibers of the Achilles tendon and cause tightness. Sometimes equinus is related to a bone blocking the ankle motion. For example, a fragment of a broken bone following an ankle injury, or bone block, can get in the way and restrict motion. Equinus may also result from one leg being shorter than the other. Less often, equinus is caused by spasms in the calf muscle. These spasms may be signs of an underlying neurologic disorder.      Foot Problems Related to Equinus  Depending on how a  patient compensates for the inability to bend properly at the ankle, a variety of foot conditions can develop, including:    Plantar fasciitis (arch/heel pain)  Calf cramping  Tendonitis (inflammation in the Achilles tendon)  Metatarsalgia (pain and/or callusing on the ball of the foot)  Flatfoot  Arthritis of the midfoot (middle area of the foot)  Pressure sores on the ball of the foot or the arch  Bunions and hammertoes  Ankle pain  Shin splints     Diagnosis  Most patients with equinus are unaware they have this condition when they first visit the doctor. Instead, they come to the doctor seeking relief for foot problems associated with equinus.    To diagnose equinus, the foot and ankle surgeon will evaluate the ankle's range of motion when the knee is flexed (bent) as well as extended (straightened). This enables the surgeon to identify whether the tendon or muscle is tight and to assess whether bone is interfering with ankle motion. X-rays may also be ordered. In some cases, the foot and ankle surgeon may refer the patient for neurologic evaluation.    Nonsurgical Treatment  Treatment includes strategies aimed at relieving the symptoms and conditions associated with equinus. In addition, the patient is treated for the equinus itself through one or more of the following options:    Night splint. The foot may be placed in a splint at night to keep it in a position that helps reduce tightness of the calf muscle.  Heel lifts. Placing heel lifts inside the shoes or wearing shoes with a moderate heel takes stress off the Achilles tendon when walking and may reduce symptoms.  Arch supports or orthotic devices. Custom orthotic devices that fit into the shoe are often prescribed to keep weight distributed properly and to help control muscle/tendon imbalance.  Physical therapy. To help remedy muscle tightness, exercises that stretch the calf muscle(s) are recommended.    When Is Surgery Needed?  In some cases, surgery  may be needed to correct the cause of equinus if it is related to a tight tendon or a bone blocking the ankle motion. The foot and ankle surgeon will determine the type of procedure that is best suited to the individual patient.    What Is a Gastrocnemius Release?  The gastrocnemius (gastroc) and the soleus are two muscles that make up the calf. The gastroc is the larger and more superficial of the two muscles. The soleus is a deeper muscle within the lower leg. The gastroc tendon combines with the soleus tendon to form the Achilles tendon.  Tightness in the calf can limit how for the ankle can flex up. This may make it difficult to walk with the heel on the floor. Over time this can cause problems such as pain and deformity. Calf tightness may contribute to many foot problems, including heel pain, Achilles tendon pain, flatfoot deformity, toe pain, and bunions.  A gastrocnemius release lengthens the gastrocnemius tendon. This is done to increase the flexibility of the calf muscle, which can decrease pressure at the front of the foot, improve function, and decrease deformity.    Diagnosis  This surgery is done in patients with tightness of the gastroc that has not improved with stretching exercises. This procedure can be combined with other reconstructive procedures or be performed by itself.  Surgery can be avoided when appropriate range of motion and flexibility can be obtained with conservative treatment (stretching). It should also be avoided if there are contractures of multiple tendons in the leg, and not just the gastroc.     Treatment  The surgery can be performed through several different incisions. Most commonly, a small incision is made on the inner side of the lower leg. Sometimes an incision directly in the back of the calf is used, or even an endoscopic incision, which is about ½ inch. Once the gastroc tendon is identified, it is  from the underlying muscle belly of the soleus, then cut  straight across. Once the tendon is released, the ankle is flexed up and an increased range of motion is noted intra-operatively.     Recovery  For the first two weeks after surgery, the patient typically is immobilized in a splint or boot. It is important to keep the ankle in a proper position while the tendon is healing. A cramping feeling in the back of the calf is normal. Gentle range of motion and stretching begin once the ankle is removed from the splint/boot. Timing can vary depending upon what other procedures are done.     Risks and Complications  After a gastroc release, some patients experience nerve injury that results in irritation or numbness over the outside of the heel. This usually is temporary. In addition, some patients may notice a difference in the appearance of one calf compared to the other and temporary calf weakness.    FAQs  Why are my calf muscles tight?   Most frequently a tight calf muscle is an inherited problem that only causes problems later in life. Other reasons for calf tightness are nerve injuries, muscle problems, and other medical problems like stroke and diabetes. People can also get tight calf muscles after trauma to the leg, ankle, or foot.    Will a gastrocnemius lengthening affect my strength or ability to walk?  This procedure will cause some weakness but most patients will not notice it. Some patients may have a subtle limp, but this typically resolves within six months of surgery.        Recommended OTC orthotics:  -powerstep  -superfeet    Recommended shoegear:  -new balance  -ascics  -marisel preston

## 2023-05-24 NOTE — PROGRESS NOTES
[facility]  [address] [place] [address], [address]  [place] [address]  Dept: [phone]  Dept Fax: [phone]    Theo George Jr., DPM     Assessment:   MDM    Coding  1. Posterior tibialis tendinitis of both lower extremities  Ambulatory referral/consult to Physical/Occupational Therapy      2. Equinus contracture of ankle  Ambulatory referral/consult to Physical/Occupational Therapy          Plan:     Nakul Waters was seen today for follow-up.    Diagnoses and all orders for this visit:    Posterior tibialis tendinitis of both lower extremities  -     Ambulatory referral/consult to Physical/Occupational Therapy; Future    Equinus contracture of ankle  -     Ambulatory referral/consult to Physical/Occupational Therapy; Future        -pt seen, evaluated, and managed  -dx discussed in detail. All questions/concerns addressed  -all tx options discussed. All alternatives, risks, benefits of all txs discussed  -the patient was educated about the diagnosis  -We discussed conservative care options possible including but not limited to shoe wear and/or padding, bracing/strapping, at home ROM, formal PT, medical therapy, injection therapy  - The utilization of NSAIDs can be considered but their benefit has to be tempered against the risk of GI/ concerns  - A steroid injection can be undertaken.  We did discuss the potential mechanism of action of this shot.  Understanding that multiple injections at the same anatomic site do have deleterious effects on the soft tissue.  Generic risks include: steroid flare (advised to ice if necessary), skin hypo-pgimentation (which can be permanent and unsightly), elevation of blood sugar, subcutaneous atrophy (can be permanent) and infection.   -XR reviewed  -labs reviewed by me: ok for vgel  -implemented icing/stretching regimen  - ASO  - wean from  -clinically improving    -rxs dispensed: none  -referrals: PT  -WB: wbat      Follow up if  symptoms worsen or fail to improve.    Subjective:      Patient ID: Evelin Taylor is a 32 y.o. female.    Chief Complaint:   Chief Complaint   Patient presents with    Follow-up     Bilateral ankle swelling follow up       CC - foot pain: patient presents to the podiatry clinic  with complaint of  bilateral foot pain. Onset of the symptoms was several weeks ago. Precipitating event: unk. Current symptoms include: ability to bear weight, but with some pain, stiffness, swelling and worsening symptoms after a period of activity. Aggravating factors: walking and certain shoegear. Symptoms have gradually worsened. Patient has had no prior foot problems. Evaluation to date: none. Treatment to date: avoidance of offending activity. Patients rates pain 7/10 on pain scale.      5/24/23:  Hx as above. Pain improved after dex inj last visit. Using ASO and doing well.    Follow-up      Last Podiatry Enc: Visit date not found  Last Enc w/ Me: Visit date not found    Outside reports reviewed: historical medical records.  Family hx: as below  Past Medical History:   Diagnosis Date    Fibroid 3/17/2023     Past Surgical History:   Procedure Laterality Date    fibroid removal      LYMPH NODE BIOPSY      OVARIAN CYST REMOVAL       Family History   Problem Relation Age of Onset    No Known Problems Father     No Known Problems Sister     No Known Problems Brother     Breast cancer Neg Hx     Ovarian cancer Neg Hx      Current Outpatient Medications   Medication Sig Dispense Refill    medroxyPROGESTERone (DEPO-PROVERA) 150 mg/mL Syrg Inject 1 mL (150 mg total) into the muscle every 3 (three) months. for 365 doses 1 mL 0     Current Facility-Administered Medications   Medication Dose Route Frequency Provider Last Rate Last Admin    medroxyPROGESTERone (DEPO-PROVERA) injection 150 mg  150 mg Intramuscular Q90 Days Rashmi Alfonso MD   150 mg at 09/19/22 0905    medroxyPROGESTERone (DEPO-PROVERA) injection 150 mg  150 mg  Intramuscular Q90 Days Rashmi Alfonso MD   150 mg at 12/19/22 1325     Review of patient's allergies indicates:   Allergen Reactions    Penicillins Itching     Hives as well. Patient stated she experienced this reaction when she had surgery in 2014.     Social History     Socioeconomic History    Marital status:    Tobacco Use    Smoking status: Never    Smokeless tobacco: Never   Substance and Sexual Activity    Alcohol use: No    Drug use: No    Sexual activity: Yes     Partners: Male     Birth control/protection: None       ROS    REVIEW OF SYSTEMS: Negative as documented below as well as positive findings in bold.       Constitutional  Respiratory  Gastrointestinal  Skin   - Fever - Cough - Heartburn - Rash   - Chills - Spit blood - Nausea - Itching   - Weight Loss - Shortness of breath - Vomiting - Nail pain   - Malaise/Fatigue - Wheezing - Abdominal Pain  Wound/Ulcer   - Weight Gain   - Blood in Stool  Poor wound healing       - Diarrhea          Cardiovascular  Genitourinary  Neurological  HEENT   - Chest Pain - Dysuria - Burning Sensation of feet - Headache   - Palpitations - Hematuria - Tingling / Paresthesia - Congestion   - Pain at night in legs - Flank Pain - Dizziness - Sore Throat   - Cramping   - Tremor - Blurred Vision   - Leg Swelling   - Sensory Change - Double Vision   - Dizzy when standing   - Speech Change - Eye Redness       - Focal Weakness - Dry Eyes       - Loss of Consciousness          Endocrine  Musculoskeletal  Psychiatric   - Cold intolerance - Muscle Pain - Depression   - Heat intolerance - Neck Pain - Insomnia   - Anemia - Joint Pain - Memory Loss   -  Easy bruising, bleeding - Heel pain - Anxiety      Toe Pain        Leg/Ankle/Foot Pain         Objective:     /85 (BP Location: Left arm)   Pulse 88   Wt 104 kg (229 lb 4.5 oz)   BMI 41.94 kg/m²   Vitals:    05/24/23 1452   BP: 132/85   Pulse: 88   Weight: 104 kg (229 lb 4.5 oz)   PainSc: 0-No pain       Physical  Exam    General Appearance:   Patient appears well developed, well nourished  Patient appears stated age    Psychiatric:   Patient is oriented to time, place, and person.  Patient has appropriate mood and affect    Neck:  Trachea Midline  No visible masses    Respiratory/Ears:  No distress or labored breathing.  Able to differentiate between normal talking voice and whisper.  Able to follow commands    Eyes:  Visual Acuity intact  Lids and conjunctivae normal. No discoloration noted.    Foot Exam  Physical Exam  Ortho Exam  Ortho/SPM Exam  Foot/Ankle Musculoskeletal Exam    B/l LE exam con't:  V:  DP 2/4, PT 2/4   CRT< 3s to all digits tested   Tibial and popliteal lymph nodes are w/o abnormality   Edema: present, varicosities: absent    N:  Patient displays normal ankle reflexes   SILT in SP/DP/T/Jackelyn/Saph distributions    Ortho: +Motor EHL/FHL/TA/GA   +TTP b/l PTT under med mal   +TTP b/l sinus tarsi    Compartments soft/compressible. No pain on passive stretch of big toe. No calf Pain.   collapsing medial longitudinal arch with wb mild but present b/l LE   +too many toes sign b/l Le   +midfoot instability noted    Derm:  skin intact, skin warm and dry, skin without ulcers or lesions, skin without induration, nails normal, no ecchymosis    Imaging / Labs:      X-Ray Ankle Complete 3 View Bilateral    Result Date: 3/28/2023  EXAM: XR ANKLE COMPLETE 3 VIEW BILATERAL CLINICAL HISTORY: Bilateral ankle pain COMPARISON:  None available Bilateral ankles 3 views FINDINGS: The joint spaces are preserved.  Normal alignment.  Calcaneal spurs bilaterally.  Negative for fracture.  No articular erosions.     No acute findings. Finalized on: 3/28/2023 3:30 PM By:  SLOAN Ball MD, MD [facility]# [id number]      2023-03-28 15:32:11.604    [facility]    X-Ray Foot Complete 3 view Bilateral    Result Date: 3/28/2023  EXAM: XR FOOT COMPLETE 3 VIEW BILATERAL CLINICAL HISTORY: [Pain] FINDINGS: 3 views bilateral feet.  No comparison. Small bilateral  plantar calcaneal spurs.  No evidence of acute fracture or dislocation.  Normal mineralization.  Soft tissues are unremarkable.      No acute findings. Finalized on: 3/28/2023 3:49 PM By:  Bar Sepulveda MD [facility]# [id number]      2023-03-28 15:51:32.861    [facility]        Note: This was dictated using a computer transcription program. Although proofread, it may contain computer transcription errors and phonetic errors. Other human proofreading errors may also exist. Corrections may be performed at a later time. Please contact us for any clarification if needed.    Theo George DPM  [facility]

## 2023-06-06 ENCOUNTER — CLINICAL SUPPORT (OUTPATIENT)
Dept: FAMILY MEDICINE | Facility: HOSPITAL | Age: 33
End: 2023-06-06
Payer: COMMERCIAL

## 2023-06-06 DIAGNOSIS — Z30.42 ENCOUNTER FOR SURVEILLANCE OF INJECTABLE CONTRACEPTIVE: Primary | ICD-10-CM

## 2023-06-06 DIAGNOSIS — Z30.42 DEPO-PROVERA CONTRACEPTIVE STATUS: ICD-10-CM

## 2023-06-06 PROCEDURE — 99212 OFFICE O/P EST SF 10 MIN: CPT

## 2023-06-06 PROCEDURE — 96372 THER/PROPH/DIAG INJ SC/IM: CPT

## 2023-06-06 RX ORDER — MEDROXYPROGESTERONE ACETATE 150 MG/ML
150 INJECTION, SUSPENSION INTRAMUSCULAR ONCE
Qty: 1 ML | Refills: 0 | Status: SHIPPED | OUTPATIENT
Start: 2023-06-06 | End: 2023-06-07

## 2023-06-06 RX ORDER — MEDROXYPROGESTERONE ACETATE 150 MG/ML
150 INJECTION, SUSPENSION INTRAMUSCULAR
Qty: 1 ML | Refills: 0 | Status: CANCELLED | OUTPATIENT
Start: 2023-06-06 | End: 2113-02-15

## 2023-06-06 RX ADMIN — MEDROXYPROGESTERONE ACETATE 150 MG: 150 INJECTION, SUSPENSION INTRAMUSCULAR at 02:06

## 2023-06-06 NOTE — NURSING
Depo injection given.Bandage applied.Tolerated well Patient instructed to wait 15 min in lobby before leaving. Verbalized understanding.Depo Calendar with next injection date given to patient.

## 2023-06-11 ENCOUNTER — HOSPITAL ENCOUNTER (EMERGENCY)
Facility: HOSPITAL | Age: 33
Discharge: HOME | End: 2023-06-11
Attending: EMERGENCY MEDICINE
Payer: COMMERCIAL

## 2023-06-11 VITALS
HEART RATE: 104 BPM | SYSTOLIC BLOOD PRESSURE: 133 MMHG | TEMPERATURE: 100 F | BODY MASS INDEX: 40.24 KG/M2 | OXYGEN SATURATION: 99 % | RESPIRATION RATE: 20 BRPM | DIASTOLIC BLOOD PRESSURE: 86 MMHG | WEIGHT: 220 LBS

## 2023-06-11 DIAGNOSIS — J02.9 SORE THROAT: Primary | ICD-10-CM

## 2023-06-11 LAB
B-HCG UR QL: NEGATIVE
CTP QC/QA: YES
GROUP A STREP, MOLECULAR: NEGATIVE
INFLUENZA A, MOLECULAR: NEGATIVE
INFLUENZA B, MOLECULAR: NEGATIVE
SARS-COV-2 RDRP RESP QL NAA+PROBE: NEGATIVE
SPECIMEN SOURCE: NORMAL

## 2023-06-11 PROCEDURE — U0002 COVID-19 LAB TEST NON-CDC: HCPCS | Performed by: PHYSICIAN ASSISTANT

## 2023-06-11 PROCEDURE — 87502 INFLUENZA DNA AMP PROBE: CPT | Performed by: PHYSICIAN ASSISTANT

## 2023-06-11 PROCEDURE — 99283 EMERGENCY DEPT VISIT LOW MDM: CPT

## 2023-06-11 PROCEDURE — 81025 URINE PREGNANCY TEST: CPT | Performed by: PHYSICIAN ASSISTANT

## 2023-06-11 PROCEDURE — 87651 STREP A DNA AMP PROBE: CPT | Performed by: PHYSICIAN ASSISTANT

## 2023-06-11 RX ORDER — AZITHROMYCIN 250 MG/1
TABLET, FILM COATED ORAL
Qty: 6 TABLET | Refills: 0 | Status: SHIPPED | OUTPATIENT
Start: 2023-06-11 | End: 2023-06-16

## 2023-06-11 NOTE — Clinical Note
"Evelin"Fiordaliza Taylor was seen and treated in our emergency department on 6/11/2023.     COVID-19 is present in our communities across the state. There is limited testing for COVID at this time, so not all patients can be tested. In this situation, your employee meets the following criteria:    Evelin Tyalor has met the criteria for COVID-19 testing and has a NEGATIVE result. The employee can return to work once they are asymptomatic for 24 hours without the use of fever reducing medications (Tylenol, Motrin, etc).     If the employee is not fully vaccinated and had a close contact:  · Retest at 5 to 7 days post-exposure  · If possible, it is recommended that they quarantine for 5 days from the time of contact regardless of their test status.  · A mask should be worn post quarantine for 5 days.    If you have any questions or concerns, or if I can be of further assistance, please do not hesitate to contact me.    Sincerely,             Zaheer Ceron PA-C"

## 2023-06-11 NOTE — ED PROVIDER NOTES
Encounter Date: 6/11/2023       History     Chief Complaint   Patient presents with    Sore Throat     Sore throat that started yesterday, left ear pain. Taking Theraflu and iburprofen. Can only drink hot liquids, painful to swallow.     32-year-old female presents to ED with concern of subjective fevers and chills, sore throat and left-sided ear pain with generalized body aches that began yesterday.  She has not taken any medications for her symptoms.  No known sick contacts but [place].  No cough, chest pain, shortness breath, abdominal pain, nausea, vomiting, diarrhea.  No other acute complaints at this time.    The history is provided by the patient.   Review of patient's allergies indicates:   Allergen Reactions    Penicillins Itching     Hives as well. Patient stated she experienced this reaction when she had surgery in 2014.     Past Medical History:   Diagnosis Date    Fibroid 3/17/2023     Past Surgical History:   Procedure Laterality Date    fibroid removal      LYMPH NODE BIOPSY      OVARIAN CYST REMOVAL       Family History   Problem Relation Age of Onset    No Known Problems Father     No Known Problems Sister     No Known Problems Brother     Breast cancer Neg Hx     Ovarian cancer Neg Hx      Social History     Tobacco Use    Smoking status: Never    Smokeless tobacco: Never   Substance Use Topics    Alcohol use: No    Drug use: No     Review of Systems   Constitutional:  Positive for chills and fever. Negative for fatigue.   HENT:  Positive for ear pain and sore throat. Negative for congestion.    Respiratory:  Negative for cough and shortness of breath.    Cardiovascular:  Negative for chest pain.   Gastrointestinal:  Negative for abdominal pain, diarrhea, nausea and vomiting.   Musculoskeletal:  Positive for myalgias. Negative for neck pain and neck stiffness.     Physical Exam     Initial Vitals [06/11/23 1146]   BP Pulse Resp Temp SpO2   133/86 104 20 99.5 °F (37.5 °C) 99 %       MAP       --         Physical Exam    Nursing note and vitals reviewed.  Constitutional: Vital signs are normal. She appears well-developed and well-nourished. She is cooperative. She does not have a sickly appearance. She does not appear ill. No distress.   HENT:   Head: Normocephalic and atraumatic.   Nose: Nose normal.     Oropharyngeal erythema with bilateral tonsillar swelling.  Exudates noted on left.  Midline uvula with no signs of abscess.  No stridor.  No drooling.  No trismus.   Eyes: EOM are normal.   Neck:   Normal range of motion.  Musculoskeletal:      Cervical back: Normal range of motion.     Neurological: She is alert and oriented to person, place, and time. GCS eye subscore is 4. GCS verbal subscore is 5. GCS motor subscore is 6.   Psychiatric: She has a normal mood and affect. Her speech is normal and behavior is normal.       ED Course   Procedures  Labs Reviewed   INFLUENZA A & B BY MOLECULAR   GROUP A STREP, MOLECULAR   SARS-COV-2 RNA AMPLIFICATION, QUAL   POCT URINE PREGNANCY          Imaging Results    None          Medications - No data to display  Medical Decision Making:   Initial Assessment:     Patient presents with concern of 2 day onset URI like symptoms.  Temp on arrival 99.5 with remaining vitals grossly WNL.  Patient well-appearing and in no obvious distress.  Oropharyngeal erythema with tonsillar swelling and occasional white exudates.  Midline uvula with no signs of abscess.  Differential Diagnosis:     COVID, influenza, viral, URI, allergy/ irritant, strep  ED Management:   COVID and flu tests are negative.  UPT negative.  Strep test is negative.  Although strep test is negative, concern for strep with high Centor score.  She will be treated with prescription for Z-Fidencio;   Patient has penicillin allergy.  Encouraged Tylenol/ Motrin as needed, hydration, rest, PCP follow-up.  ED return precautions were discussed.  Patient states her understanding and agrees with plan.                         Clinical Impression:   Final diagnoses:  [J02.9] Sore throat (Primary)        ED Disposition Condition    Discharge Stable          ED Prescriptions       Medication Sig Dispense Start Date End Date Auth. Provider    azithromycin (Z-DAVID) 250 MG tablet Take 2 tablets by mouth on day 1; Take 1 tablet by mouth on days 2-5 6 tablet 6/11/2023 6/16/2023 Zaheer Ceron PA-C          Follow-up Information       Follow up With Specialties Details Why Contact Info    Oscar Menard MD Family Medicine   [address]  [facility] [address] [phone]               Zaheer Ceron PA-C  06/11/23 [id number]

## 2023-06-12 ENCOUNTER — NURSE TRIAGE (OUTPATIENT)
Dept: ADMINISTRATIVE | Facility: CLINIC | Age: 33
End: 2023-06-12
Payer: COMMERCIAL

## 2023-06-13 ENCOUNTER — HOSPITAL ENCOUNTER (EMERGENCY)
Facility: HOSPITAL | Age: 33
Discharge: HOME | End: 2023-06-13
Attending: EMERGENCY MEDICINE
Payer: COMMERCIAL

## 2023-06-13 VITALS
OXYGEN SATURATION: 99 % | SYSTOLIC BLOOD PRESSURE: 133 MMHG | DIASTOLIC BLOOD PRESSURE: 84 MMHG | HEART RATE: 90 BPM | BODY MASS INDEX: 31.5 KG/M2 | RESPIRATION RATE: 16 BRPM | WEIGHT: 220 LBS | HEIGHT: 70 IN | TEMPERATURE: 98 F

## 2023-06-13 DIAGNOSIS — H10.9 CONJUNCTIVITIS OF BOTH EYES, UNSPECIFIED CONJUNCTIVITIS TYPE: Primary | ICD-10-CM

## 2023-06-13 PROCEDURE — 99283 EMERGENCY DEPT VISIT LOW MDM: CPT

## 2023-06-13 RX ORDER — CETIRIZINE HYDROCHLORIDE 10 MG/1
10 TABLET ORAL DAILY
Qty: 30 TABLET | Refills: 0 | Status: SHIPPED | OUTPATIENT
Start: 2023-06-13 | End: 2024-06-12

## 2023-06-13 RX ORDER — ERYTHROMYCIN 5 MG/G
OINTMENT OPHTHALMIC
Qty: 3.5 G | Refills: 0 | Status: SHIPPED | OUTPATIENT
Start: 2023-06-13

## 2023-06-13 NOTE — Clinical Note
"Evelin Morrella" Brandon was seen and treated in our emergency department on 6/13/2023.  She may return to work on 06/15/2023.       If you have any questions or concerns, please don't hesitate to call.      Zaheer Ceron PA-C"

## 2023-06-13 NOTE — ED PROVIDER NOTES
Encounter Date: 6/13/2023       History     Chief Complaint   Patient presents with    Eye Problem     Burning and redness to both eye worse in the right eye after taking antibiotics for sore throat.  Still has sore throat.       32-year-old female presents to ED with concern bilateral eye redness and mild burning that began yesterday.  Seen in ED 2 days ago with complaint of URI like symptoms.  She was prescribed Z-Fidencio with some improvement of her URI like symptoms but continued nasal congestion.  No fevers.  No visual changes or drainage.  Does not use contact lenses.  No injuries or trauma.  No other acute complaints at this time.    The history is provided by the patient.   Review of patient's allergies indicates:   Allergen Reactions    Penicillins Itching     Hives as well. Patient stated she experienced this reaction when she had surgery in 2014.     Past Medical History:   Diagnosis Date    Fibroid 3/17/2023     Past Surgical History:   Procedure Laterality Date    fibroid removal      LYMPH NODE BIOPSY      OVARIAN CYST REMOVAL       Family History   Problem Relation Age of Onset    No Known Problems Father     No Known Problems Sister     No Known Problems Brother     Breast cancer Neg Hx     Ovarian cancer Neg Hx      Social History     Tobacco Use    Smoking status: Never    Smokeless tobacco: Never   Substance Use Topics    Alcohol use: No    Drug use: No     Review of Systems   Constitutional:  Negative for chills and fever.   HENT:  Positive for congestion. Negative for sore throat.    Eyes:  Positive for redness and itching. Negative for photophobia and visual disturbance.   Respiratory:  Negative for cough and shortness of breath.    Gastrointestinal:  Negative for abdominal pain, nausea and vomiting.   Skin:  Negative for rash.   Neurological:  Negative for headaches.     Physical Exam     Initial Vitals [06/13/23 1014]   BP Pulse Resp Temp SpO2   133/84 90 16 98.2 °F (36.8 °C) 99 %      MAP        --         Physical Exam    Nursing note and vitals reviewed.  Constitutional: Vital signs are normal. She appears well-developed and well-nourished. She is active. She does not have a sickly appearance. She does not appear ill. No distress.   HENT:   Head: Normocephalic and atraumatic.   Eyes:     Bilateral eye conjunctiva injected.  No drainage or crusting of eyelids.  No surrounding eye tenderness, erythema or swelling.  No pain with EOM.  PERRL.   Neck:   Normal range of motion.  Cardiovascular:  Normal rate.           Pulmonary/Chest: Effort normal.   Musculoskeletal:      Cervical back: Normal range of motion.     Neurological: She is alert. GCS eye subscore is 4. GCS verbal subscore is 5. GCS motor subscore is 6.   Skin: Skin is warm and dry.   Psychiatric: She has a normal mood and affect. Her speech is normal and behavior is normal.       ED Course   Procedures  Labs Reviewed - No data to display       Imaging Results    None          Medications - No data to display  Medical Decision Making:   Initial Assessment:     Patient presents with concern of bilateral eye redness that began yesterday.  She was seen in ED 2 days ago for URI like symptoms.  Afebrile with vitals WNL.  Bilateral eye conjunctiva injected on exam.  No drainage.  No pain with EOM.  Differential Diagnosis:     conjunctivitis viral/bacterial / allergy, URI  ED Management:   Patient has bilateral eye redness likely viral as patient has also having viral URI like symptoms.  Given prescription for Zyrtec.  Will also give prescription for topical erythromycin to use only if she begins to get drainage or worsening of her symptoms.  ED return precautions were discussed at length.  Patient states her understanding and agrees with plan.                        Clinical Impression:   Final diagnoses:  [H10.9] Conjunctivitis of both eyes, unspecified conjunctivitis type (Primary)        ED Disposition Condition    Discharge Stable          ED  Prescriptions       Medication Sig Dispense Start Date End Date Auth. Provider    cetirizine (ZYRTEC) 10 MG tablet Take 1 tablet (10 mg total) by mouth once daily. 30 tablet 6/13/2023 6/12/2024 Zaheer Ceron PA-C    erythromycin (ROMYCIN) ophthalmic ointment Place a 1/2 inch ribbon of ointment into the lower eyelid. Apply q.I.d. x5 days 3.5 g 6/13/2023 -- Zaheer Ceron PA-C          Follow-up Information       Follow up With Specialties Details Why Contact Info    Oscar Menard MD Family Medicine   [address], [address]  [place] [address] [phone]               Zaheer Ceron PA-C  06/13/23 [id number]

## 2023-06-13 NOTE — ED TRIAGE NOTES
Seen here on Sunday with c/o sore throat and URI symptoms - started on antibiotics. States shortly after starting antibiotics, began with bilateral eye irritation and burning. Continues with sinus pressure and congestion. No fever reported.

## 2023-06-13 NOTE — DISCHARGE INSTRUCTIONS

## 2023-06-13 NOTE — TELEPHONE ENCOUNTER
"Pt states that she was seen in ED on yesterday for sore throat and started ABT. Now c/o dry eyes and new blurred vision to both eyes today. Advised to go to ED per protocol. Verbalized understanding. Encounter routed to provider.          Reason for Disposition   [1] Recent eye surgery AND [2] increasing eye pain    Additional Information   Negative: SEVERE eye pain   Negative: Complete loss of vision in one or both eyes   Negative: [1] Eyelids are very swollen (shut or almost) AND [2] fever   Negative: [1] Eyelid (outer) is very red AND [2] fever   Negative: [1] Foreign body sensation ("feels like something is in there") AND [2] irrigation didn't help   Negative: Vomiting   Negative: Ulcer or sore seen on the cornea (clear center part of the eye)    Protocols used: Eye Pain and Other Symptoms-A-AH    "

## 2023-06-20 ENCOUNTER — OFFICE VISIT (OUTPATIENT)
Dept: URGENT CARE | Facility: CLINIC | Age: 33
End: 2023-06-20
Payer: COMMERCIAL

## 2023-06-20 VITALS
SYSTOLIC BLOOD PRESSURE: 130 MMHG | WEIGHT: 220 LBS | HEIGHT: 70 IN | HEART RATE: 81 BPM | RESPIRATION RATE: 18 BRPM | DIASTOLIC BLOOD PRESSURE: 91 MMHG | BODY MASS INDEX: 31.5 KG/M2 | TEMPERATURE: 98 F | OXYGEN SATURATION: 98 %

## 2023-06-20 DIAGNOSIS — J02.9 SORE THROAT: Primary | ICD-10-CM

## 2023-06-20 LAB
CTP QC/QA: YES
CTP QC/QA: YES
HETEROPH AB SER QL: NEGATIVE
MOLECULAR STREP A: NEGATIVE

## 2023-06-20 PROCEDURE — 86308 POCT INFECTIOUS MONONUCLEOSIS: ICD-10-PCS | Mod: QW,S$GLB,, | Performed by: NURSE PRACTITIONER

## 2023-06-20 PROCEDURE — 87651 STREP A DNA AMP PROBE: CPT | Mod: QW,S$GLB,, | Performed by: NURSE PRACTITIONER

## 2023-06-20 PROCEDURE — 99213 OFFICE O/P EST LOW 20 MIN: CPT | Mod: S$GLB,,, | Performed by: NURSE PRACTITIONER

## 2023-06-20 PROCEDURE — 99213 PR OFFICE/OUTPT VISIT, EST, LEVL III, 20-29 MIN: ICD-10-PCS | Mod: S$GLB,,, | Performed by: NURSE PRACTITIONER

## 2023-06-20 PROCEDURE — 87651 POCT STREP A MOLECULAR: ICD-10-PCS | Mod: QW,S$GLB,, | Performed by: NURSE PRACTITIONER

## 2023-06-20 PROCEDURE — 86308 HETEROPHILE ANTIBODY SCREEN: CPT | Mod: QW,S$GLB,, | Performed by: NURSE PRACTITIONER

## 2023-06-20 NOTE — PROGRESS NOTES
"Subjective:      Patient ID: Evelin Taylor is a 32 y.o. female.    Vitals:  height is 5' 10" (1.778 m) and weight is 99.8 kg (220 lb). Her temperature is 98.3 °F (36.8 °C). Her blood pressure is 130/91 (abnormal) and her pulse is 81. Her respiration is 18 and oxygen saturation is 98%.     Chief Complaint: Sore Throat    Sore Throat   The current episode started in the past 7 days. The problem has been gradually worsening. Sore throat worse side: both. There has been no fever. The pain is at a severity of 7/10. Associated symptoms include ear pain, a hoarse voice and swollen glands. She has had no exposure to strep or mono. She has tried NSAIDs for the symptoms. The treatment provided no relief.     HENT:  Positive for ear pain and sore throat.     Objective:     Physical Exam   Constitutional: She is oriented to person, place, and time. She appears well-developed. She is cooperative.   HENT:   Head: Normocephalic and atraumatic.   Ears:   Right Ear: Hearing, tympanic membrane, external ear and ear canal normal.   Left Ear: Hearing, tympanic membrane, external ear and ear canal normal.   Nose: Nose normal. No mucosal edema or nasal deformity. No epistaxis. Right sinus exhibits no maxillary sinus tenderness and no frontal sinus tenderness. Left sinus exhibits no maxillary sinus tenderness and no frontal sinus tenderness.   Mouth/Throat: Uvula is midline and mucous membranes are normal. No trismus in the jaw. Normal dentition. No uvula swelling. Posterior oropharyngeal erythema present. No oropharyngeal exudate.   Eyes: Conjunctivae and lids are normal.   Neck: Trachea normal and phonation normal. Neck supple.   Cardiovascular: Normal rate, regular rhythm, normal heart sounds and normal pulses.   Pulmonary/Chest: Effort normal and breath sounds normal.   Abdominal: Normal appearance and bowel sounds are normal. Soft.   Musculoskeletal: Normal range of motion.         General: Normal range of motion. "   Neurological: She is alert and oriented to person, place, and time. She exhibits normal muscle tone.   Skin: Skin is warm, dry and intact.   Psychiatric: Her speech is normal and behavior is normal. Judgment and thought content normal.   Nursing note and vitals reviewed.    Assessment:     1. Sore throat        Plan:       Sore throat  -     POCT Strep A, Molecular  -     Cancel: HETEROPHILE AB SCREEN  -     POCT Infectious mononucleosis antibody          Medical Decision Making:   History:   Old Records Summarized: records from clinic visits.       <> Summary of Records: Recent ER visits for sore throat.   Initial Assessment:   Nontoxic appearing 33 yo female c/o sore throat.    After complete evaluation, including thorough history and physical exam, the patient's symptoms are most likely due to viral pharyngitis. There are no concerning features on physical exam to suggest bacterial otitis media/externa, sinusitis, strep pharyngitis, or peritonsillar abscess. Vital signs do not suggest sepsis. Lung sounds are clear and not consistent with pneumonia. There is no neck pain or limited ROM to suggest retropharyngeal abscess or meningitis. I did consider mono due to recent similar complaint at the ER and tested in clinic. In clinic testing for strep and mono is negative.The patient will be treated with supportive care.       Clinical Tests:   Lab Tests: Reviewed       <> Summary of Lab: Strep neg  Mono neg     Results for orders placed or performed in visit on 06/20/23   POCT Strep A, Molecular   Result Value Ref Range    Molecular Strep A, POC Negative Negative     Acceptable Yes    POCT Infectious mononucleosis antibody   Result Value Ref Range    Monospot Negative Negative     Acceptable Yes      Patient Instructions   Pharyngitis     Your strep test today is negative.     Sore throats are often caused by postnasal drainage. This could be caused by environmental allergies (allergic  rhinitis) or a viral upper respiratory infection.     Flonase (fluticasone) is a nasal spray which is available over the counter and may help with your symptoms.      If you just have post nasal drainage you can take plain zyrtec, claritin or allegra     To alleviate throat pain you can use Chloraseptic spray or lozenges.      Take Tylenol or ibuprofen to relieve fever or pain may help as long as you are not allergic to these medications.     Ibuprofen (an NSAID) is not recommended if have a medical condition such as stomach ulcers, liver or kidney disease or taking blood thinners etc that would prevent you from using these medications.  Rest and fluids will help your symptoms.    f your condition worsens or fails to improve we recommend that you receive another evaluation at the urgent care/ER immediately or contact your PCP to discuss your concerns. You must understand that you've received an urgent care treatment only and that you may be released before all your medical problems are known or treated. You the patient will arrange for followup care as instructed.

## 2023-06-20 NOTE — PATIENT INSTRUCTIONS
Pharyngitis     Your strep test today is negative.     Sore throats are often caused by postnasal drainage. This could be caused by environmental allergies (allergic rhinitis) or a viral upper respiratory infection.     Flonase (fluticasone) is a nasal spray which is available over the counter and may help with your symptoms.      If you just have post nasal drainage you can take plain zyrtec, claritin or allegra     To alleviate throat pain you can use Chloraseptic spray or lozenges.      Take Tylenol or ibuprofen to relieve fever or pain may help as long as you are not allergic to these medications.     Ibuprofen (an NSAID) is not recommended if have a medical condition such as stomach ulcers, liver or kidney disease or taking blood thinners etc that would prevent you from using these medications.  Rest and fluids will help your symptoms.    f your condition worsens or fails to improve we recommend that you receive another evaluation at the urgent care/ER immediately or contact your PCP to discuss your concerns. You must understand that you've received an urgent care treatment only and that you may be released before all your medical problems are known or treated. You the patient will arrange for followup care as instructed.

## 2023-08-17 ENCOUNTER — OFFICE VISIT (OUTPATIENT)
Dept: FAMILY MEDICINE | Facility: CLINIC | Age: 33
End: 2023-08-17
Payer: COMMERCIAL

## 2023-08-17 ENCOUNTER — LAB VISIT (OUTPATIENT)
Dept: LAB | Facility: HOSPITAL | Age: 33
End: 2023-08-17
Attending: INTERNAL MEDICINE
Payer: COMMERCIAL

## 2023-08-17 VITALS
DIASTOLIC BLOOD PRESSURE: 82 MMHG | WEIGHT: 236.31 LBS | OXYGEN SATURATION: 99 % | HEIGHT: 70 IN | HEART RATE: 90 BPM | SYSTOLIC BLOOD PRESSURE: 128 MMHG | BODY MASS INDEX: 33.83 KG/M2

## 2023-08-17 DIAGNOSIS — Z00.00 ANNUAL PHYSICAL EXAM: Primary | ICD-10-CM

## 2023-08-17 DIAGNOSIS — Z82.69 FAMILY HISTORY OF SYSTEMIC LUPUS ERYTHEMATOSUS: ICD-10-CM

## 2023-08-17 DIAGNOSIS — Z83.3 FAMILY HISTORY OF DIABETES MELLITUS: ICD-10-CM

## 2023-08-17 DIAGNOSIS — R59.1 LYMPHADENOPATHY: ICD-10-CM

## 2023-08-17 DIAGNOSIS — M25.50 POLYARTHRALGIA: ICD-10-CM

## 2023-08-17 DIAGNOSIS — E04.9 GOITER: ICD-10-CM

## 2023-08-17 LAB
BILIRUB UR QL STRIP: NEGATIVE
CLARITY UR: CLEAR
COLOR UR: YELLOW
GLUCOSE UR QL STRIP: NEGATIVE
HGB UR QL STRIP: NEGATIVE
KETONES UR QL STRIP: NEGATIVE
LEUKOCYTE ESTERASE UR QL STRIP: NEGATIVE
NITRITE UR QL STRIP: NEGATIVE
PH UR STRIP: 6 [PH] (ref 5–8)
PROT UR QL STRIP: ABNORMAL
SP GR UR STRIP: 1.02 (ref 1–1.03)
URN SPEC COLLECT METH UR: ABNORMAL
UROBILINOGEN UR STRIP-ACNC: NEGATIVE EU/DL

## 2023-08-17 PROCEDURE — 3044F PR MOST RECENT HEMOGLOBIN A1C LEVEL <7.0%: ICD-10-PCS | Mod: CPTII,S$GLB,, | Performed by: INTERNAL MEDICINE

## 2023-08-17 PROCEDURE — 3008F PR BODY MASS INDEX (BMI) DOCUMENTED: ICD-10-PCS | Mod: CPTII,S$GLB,, | Performed by: INTERNAL MEDICINE

## 2023-08-17 PROCEDURE — 1159F MED LIST DOCD IN RCRD: CPT | Mod: CPTII,S$GLB,, | Performed by: INTERNAL MEDICINE

## 2023-08-17 PROCEDURE — 99395 PREV VISIT EST AGE 18-39: CPT | Mod: S$GLB,,, | Performed by: INTERNAL MEDICINE

## 2023-08-17 PROCEDURE — 99999 PR PBB SHADOW E&M-EST. PATIENT-LVL IV: CPT | Mod: PBBFAC,,, | Performed by: INTERNAL MEDICINE

## 2023-08-17 PROCEDURE — 99395 PR PREVENTIVE VISIT,EST,18-39: ICD-10-PCS | Mod: S$GLB,,, | Performed by: INTERNAL MEDICINE

## 2023-08-17 PROCEDURE — 1160F PR REVIEW ALL MEDS BY PRESCRIBER/CLIN PHARMACIST DOCUMENTED: ICD-10-PCS | Mod: CPTII,S$GLB,, | Performed by: INTERNAL MEDICINE

## 2023-08-17 PROCEDURE — 1159F PR MEDICATION LIST DOCUMENTED IN MEDICAL RECORD: ICD-10-PCS | Mod: CPTII,S$GLB,, | Performed by: INTERNAL MEDICINE

## 2023-08-17 PROCEDURE — 3079F PR MOST RECENT DIASTOLIC BLOOD PRESSURE 80-89 MM HG: ICD-10-PCS | Mod: CPTII,S$GLB,, | Performed by: INTERNAL MEDICINE

## 2023-08-17 PROCEDURE — 3074F PR MOST RECENT SYSTOLIC BLOOD PRESSURE < 130 MM HG: ICD-10-PCS | Mod: CPTII,S$GLB,, | Performed by: INTERNAL MEDICINE

## 2023-08-17 PROCEDURE — 3074F SYST BP LT 130 MM HG: CPT | Mod: CPTII,S$GLB,, | Performed by: INTERNAL MEDICINE

## 2023-08-17 PROCEDURE — 3079F DIAST BP 80-89 MM HG: CPT | Mod: CPTII,S$GLB,, | Performed by: INTERNAL MEDICINE

## 2023-08-17 PROCEDURE — 99999 PR PBB SHADOW E&M-EST. PATIENT-LVL IV: ICD-10-PCS | Mod: PBBFAC,,, | Performed by: INTERNAL MEDICINE

## 2023-08-17 PROCEDURE — 3008F BODY MASS INDEX DOCD: CPT | Mod: CPTII,S$GLB,, | Performed by: INTERNAL MEDICINE

## 2023-08-17 PROCEDURE — 81003 URINALYSIS AUTO W/O SCOPE: CPT | Performed by: INTERNAL MEDICINE

## 2023-08-17 PROCEDURE — 1160F RVW MEDS BY RX/DR IN RCRD: CPT | Mod: CPTII,S$GLB,, | Performed by: INTERNAL MEDICINE

## 2023-08-17 PROCEDURE — 3044F HG A1C LEVEL LT 7.0%: CPT | Mod: CPTII,S$GLB,, | Performed by: INTERNAL MEDICINE

## 2023-08-17 NOTE — PROGRESS NOTES
Subjective:       Patient ID: Evelin Taylor is a 32 y.o. female.    Chief Complaint: Establish Care      HPI  Evelin Taylor is a 32 y.o. female with history of recurrent cervical lymphadenitis and monitoring fibroid who presents today for Establish Care    Reports she several family members have lupus panel like to be evaluated.  Has 3 sisters, grandparent, and 2 nieces with lupus.  Nephew and other family members with kidney disease and hypertension.  Also family history of diabetes.  She has been evaluated for swollen lymph nodes been felt to be associated to upper respiratory tract infections and has been treated for allergies.  Had a lymph node biopsy to rule out lymphoma and there was no signs of malignancy.  She is been followed by Podiatry for ankle pain and swelling and treated for tendinitis and sinus tarsi syndrome.    Denies recent fever, shortness of breath, palpitations, changes in color of the urine, GI symptoms, or weight changes.  She has cold intolerance.    No toxic habits.  Not on any special diet or exercising.  Works as a supervisor for [employer].    Health Maintenance:  Health Maintenance   Topic Date Due    Hepatitis C Screening  Never done    Lipid Panel  Never done    TETANUS VACCINE  Never done       Review of Systems   Constitutional: Negative.  Negative for fever and unexpected weight change.   HENT: Negative.     Respiratory: Negative.     Cardiovascular: Negative.    Gastrointestinal: Negative.    Endocrine: Positive for cold intolerance.   Genitourinary:  Negative for difficulty urinating.        On Nexplanon   Musculoskeletal:  Positive for arthralgias (hand and ankle/feet pain.) and joint swelling (ankles).   Integumentary:  Negative.   Neurological: Negative.    Hematological:  Positive for adenopathy (neck nodes for 2 weeks).   Psychiatric/Behavioral:  Positive for sleep disturbance. The patient is nervous/anxious (Some anxiety).       Past Medical  History:   Diagnosis Date    Fibroid 3/17/2023       Past Surgical History:   Procedure Laterality Date    fibroid removal      LYMPH NODE BIOPSY      OVARIAN CYST REMOVAL         Family History   Problem Relation Age of Onset    No Known Problems Father     No Known Problems Sister     No Known Problems Brother     Breast cancer Neg Hx     Ovarian cancer Neg Hx        Social History     Socioeconomic History    Marital status:    Tobacco Use    Smoking status: Never    Smokeless tobacco: Never   Substance and Sexual Activity    Alcohol use: No    Drug use: No    Sexual activity: Yes     Partners: Male     Birth control/protection: None       Current Outpatient Medications   Medication Sig Dispense Refill    cetirizine (ZYRTEC) 10 MG tablet Take 1 tablet (10 mg total) by mouth once daily. (Patient not taking: Reported on 6/20/2023) 30 tablet 0    erythromycin (ROMYCIN) ophthalmic ointment Place a 1/2 inch ribbon of ointment into the lower eyelid. Apply q.I.d. x5 days (Patient not taking: Reported on 6/20/2023) 3.5 g 0    medroxyPROGESTERone (DEPO-PROVERA) 150 mg/mL Syrg Inject 1 mL (150 mg total) into the muscle every 3 (three) months. for 365 doses (Patient not taking: Reported on 6/20/2023) 1 mL 0     Current Facility-Administered Medications   Medication Dose Route Frequency Provider Last Rate Last Admin    medroxyPROGESTERone (DEPO-PROVERA) injection 150 mg  150 mg Intramuscular Q90 Days Rashmi Alfonso MD   150 mg at 09/19/22 0905    medroxyPROGESTERone (DEPO-PROVERA) injection 150 mg  150 mg Intramuscular Q90 Days Rashmi Alfonso MD   150 mg at 06/06/23 1414       Review of patient's allergies indicates:   Allergen Reactions    Penicillins Itching     Hives as well. Patient stated she experienced this reaction when she had surgery in 2014.         Objective:       Last 3 sets of Vitals    Vitals - 1 value per visit 6/20/2023 8/17/2023 8/17/2023   SYSTOLIC 130 - 128   DIASTOLIC 91 - 82   Pulse 81 - 90    Temp 98.3 - -   Resp 18 - -   SPO2 98 - 99   Weight (lb) 220 - 236.33   Weight (kg) 99.791 - 107.2   Height 70 - 70   BMI (Calculated) 31.6 - 33.9   VISIT REPORT [id number]  [id number]; 17NONCRENCREPNotFromCR  [id number]; 17NONCRENCREPNotFromCR  [id number];   Pain Score  - 0 -   Physical Exam  Constitutional:       General: She is not in acute distress.  HENT:      Head: Normocephalic.      Right Ear: Tympanic membrane, ear canal and external ear normal.      Left Ear: Tympanic membrane, ear canal and external ear normal.      Nose: Nose normal.      Mouth/Throat:      Mouth: Mucous membranes are moist.   Eyes:      General: No scleral icterus.     Extraocular Movements: Extraocular movements intact.      Conjunctiva/sclera: Conjunctivae normal.   Neck:      Vascular: No carotid bruit.      Comments: Prominent thyroid. Previous lymph node biopsy scar.  Cardiovascular:      Rate and Rhythm: Normal rate and regular rhythm.      Pulses: Normal pulses.      Heart sounds: Normal heart sounds.   Pulmonary:      Effort: Pulmonary effort is normal.      Breath sounds: Normal breath sounds.   Abdominal:      General: Bowel sounds are normal. There is no distension.      Palpations: Abdomen is soft. There is no mass.      Tenderness: There is no abdominal tenderness.   Musculoskeletal:         General: No swelling.      Comments: Mild swollen ankles with no warmth or erythema   Lymphadenopathy:      Cervical: No cervical adenopathy.   Skin:     General: Skin is warm and dry.   Neurological:      General: No focal deficit present.      Mental Status: She is alert and oriented to person, place, and time.   Psychiatric:         Mood and Affect: Mood normal.         Behavior: Behavior normal.           CBC:  Recent Labs   Lab 09/13/22  1201   WBC 3.69 L   RBC 4.33   Hemoglobin 12.8   Hematocrit 37.7   Platelets 215   MCV 87   MCH 29.6   MCHC 34.0     CMP:  Recent Labs   Lab 09/13/22  1201   Glucose 82    Calcium 9.6   Albumin 3.9   Total Protein 7.8   Sodium 140   Potassium 3.9   CO2 26   Chloride 103   BUN 10   Creatinine 0.7   Alkaline Phosphatase 76   ALT 12   AST 15   Total Bilirubin 0.5     URINALYSIS:       LIPIDS:  Recent Labs   Lab 09/13/22  1201   TSH 0.960     TSH:  Recent Labs   Lab 09/13/22  1201   TSH 0.960       A1C:        Imaging:  X-Ray Foot Complete 3 view Bilateral  Narrative: EXAM: XR FOOT COMPLETE 3 VIEW BILATERAL    CLINICAL HISTORY: [Pain]    FINDINGS: 3 views bilateral feet.  No comparison.    Small bilateral plantar calcaneal spurs.  No evidence of acute fracture or dislocation.  Normal mineralization.  Soft tissues are unremarkable.  Impression:  No acute findings.    Finalized on: 3/28/2023 3:49 PM By:  Bar Sepulveda MD  [facility]# [id number]      2023-03-28 15:51:32.861    [facility]  X-Ray Ankle Complete 3 View Bilateral  Narrative: EXAM: XR ANKLE COMPLETE 3 VIEW BILATERAL    CLINICAL HISTORY: Bilateral ankle pain    COMPARISON:  None available    Bilateral ankles 3 views    FINDINGS:    The joint spaces are preserved.  Normal alignment.  Calcaneal spurs bilaterally.  Negative for fracture.  No articular erosions.  Impression: No acute findings.    Finalized on: 3/28/2023 3:30 PM By:  SLOAN Ball MD, MD  [facility]# [id number]      2023-03-28 15:32:11.604    [facility]      Assessment:       1. Annual physical exam    2. Lymphadenopathy    3. Polyarthralgia    4. Goiter    5. Family history of systemic lupus erythematosus    6. Family history of diabetes mellitus            Plan:       1. Annual physical exam  -     Lipid Panel; Future; Expected date: 08/17/2023  -     CBC Auto Differential; Future; Expected date: 08/17/2023  -     Comprehensive Metabolic Panel; Future; Expected date: 08/17/2023  -     Hemoglobin A1C; Future; Expected date: 08/17/2023  -  UA  -     TSH; Future; Expected date: 08/17/2023  -     Vitamin D; Future; Expected date: 08/17/2023  - stable exam with normal vital signs.  BMI is 33.9 and  advise on weight loss for BMI less than 30 to decrease risk of comorbidities.    2. Lymphadenopathy  -     US Soft Tissue Head Neck Thyroid; Future; Expected date: 08/17/2023  - no clear lymphadenopathy but thyroid appears prominent.  Will evaluate with ultrasound.    3. Polyarthralgia  -     VALERIE Screen w/Reflex; Future; Expected date: 08/17/2023  -     Rheumatoid Factor; Future; Expected date: 08/17/2023  -     C-Reactive Protein; Future; Expected date: 08/17/2023  -     Sedimentation rate; Future; Expected date: 08/17/2023  -     Uric Acid; Future; Expected date: 08/17/2023  - no evidence of active synovitis.    4. Goiter  -     US Soft Tissue Head Neck Thyroid; Future; Expected date: 08/17/2023  -     TSH; Future; Expected date: 08/17/2023  -     T4, Free; Future; Expected date: 08/17/2023    5. Family history of systemic lupus erythematosus  -     VALERIE Screen w/Reflex; Future; Expected date: 08/17/2023  -     Rheumatoid Factor; Future; Expected date: 08/17/2023  -     C-Reactive Protein; Future; Expected date: 08/17/2023  -     Sedimentation rate; Future; Expected date: 08/17/2023  - no evidence of active synovitis    6. Family history of diabetes mellitus  -     Comprehensive Metabolic Panel; Future; Expected date: 08/17/2023  -     Hemoglobin A1C; Future; Expected date: 08/17/2023  - lifestyle modifications recommended.       Health Maintenance Due   Topic Date Due    Hepatitis C Screening  Never done    Lipid Panel  Never done    HIV Screening  Never done    TETANUS VACCINE  Never done    COVID-19 Vaccine (3 - Pfizer series) 06/10/2021            Return to clinic depending on lab results.    Chanel Hernandez MD  [facility]  Disclaimer:  This note has been generated using voice-recognition software. There may be grammatical or spelling errors that have been missed during proof-reading

## 2023-08-18 ENCOUNTER — OFFICE VISIT (OUTPATIENT)
Dept: URGENT CARE | Facility: CLINIC | Age: 33
End: 2023-08-18
Payer: COMMERCIAL

## 2023-08-18 ENCOUNTER — PATIENT MESSAGE (OUTPATIENT)
Dept: FAMILY MEDICINE | Facility: CLINIC | Age: 33
End: 2023-08-18
Payer: COMMERCIAL

## 2023-08-18 VITALS
SYSTOLIC BLOOD PRESSURE: 120 MMHG | HEIGHT: 70 IN | OXYGEN SATURATION: 98 % | BODY MASS INDEX: 33.79 KG/M2 | DIASTOLIC BLOOD PRESSURE: 82 MMHG | WEIGHT: 236 LBS | RESPIRATION RATE: 18 BRPM | TEMPERATURE: 98 F | HEART RATE: 86 BPM

## 2023-08-18 DIAGNOSIS — R21 RASH OF BOTH FEET: ICD-10-CM

## 2023-08-18 DIAGNOSIS — L85.1 ACQUIRED PLANTAR KERATODERMA: Primary | ICD-10-CM

## 2023-08-18 DIAGNOSIS — Z23 NEED FOR TDAP VACCINATION: ICD-10-CM

## 2023-08-18 DIAGNOSIS — T14.8XXA ABRASION: ICD-10-CM

## 2023-08-18 PROCEDURE — 99214 PR OFFICE/OUTPT VISIT, EST, LEVL IV, 30-39 MIN: ICD-10-PCS | Mod: 25,S$GLB,, | Performed by: PHYSICIAN ASSISTANT

## 2023-08-18 PROCEDURE — 90715 TDAP VACCINE GREATER THAN OR EQUAL TO 7YO IM: ICD-10-PCS | Mod: S$GLB,,, | Performed by: PHYSICIAN ASSISTANT

## 2023-08-18 PROCEDURE — 90715 TDAP VACCINE 7 YRS/> IM: CPT | Mod: S$GLB,,, | Performed by: PHYSICIAN ASSISTANT

## 2023-08-18 PROCEDURE — 90471 TDAP VACCINE GREATER THAN OR EQUAL TO 7YO IM: ICD-10-PCS | Mod: S$GLB,,, | Performed by: PHYSICIAN ASSISTANT

## 2023-08-18 PROCEDURE — 99214 OFFICE O/P EST MOD 30 MIN: CPT | Mod: 25,S$GLB,, | Performed by: PHYSICIAN ASSISTANT

## 2023-08-18 PROCEDURE — 90471 IMMUNIZATION ADMIN: CPT | Mod: S$GLB,,, | Performed by: PHYSICIAN ASSISTANT

## 2023-08-18 RX ORDER — AMMONIUM LACTATE 12 G/100G
CREAM TOPICAL
Qty: 140 G | Refills: 0 | Status: SHIPPED | OUTPATIENT
Start: 2023-08-18

## 2023-08-18 NOTE — LETTER
August 18, 2023    Evelin Taylor  [address] [facility] [address]         [facility]  [address]  [facility] [address]  Phone: [phone]  Fax: [phone] August 18, 2023     Patient: Evelin Taylor   YOB: 1990   Date of Visit: 8/18/2023       To Whom It May Concern:    It is my medical opinion that Evelin Taylor may return to work on 8/20/2023 .    If you have any questions or concerns, please don't hesitate to call.    Sincerely,        Louis Dee PA-C

## 2023-08-18 NOTE — PROGRESS NOTES
"Subjective:      Patient ID: Evelin Taylor is a 32 y.o. female.    Vitals:  height is 5' 10" (1.778 m) and weight is 107 kg (236 lb). Her temperature is 98.4 °F (36.9 °C). Her blood pressure is 120/82 and her pulse is 86. Her respiration is 18 and oxygen saturation is 98%.     Chief Complaint: Heel Pain    32 year old female presents today with bilateral heel pain and feet pain due to rash. States both of her heels are cracking of the skin and starting bleeding from them cracking too much. Also states the pain is sometimes unbearable to ambulate. Pain scale 08/10. Pain started about 3 days ago for flare but initially started 1 moth ago. Treatments at home includes vaseline.  Went to get a [facility] 1 month ago which worsened her symptoms.  States she seen a Podiatrist for ankle tendinitis but not for this rash on her feet.  Not up to date with tetanus.     Foot Pain  This is a recurrent problem. The current episode started in the past 7 days. The problem has been gradually worsening. Associated symptoms include a rash. Pertinent negatives include no abdominal pain, arthralgias, chest pain, chills, congestion, coughing, diaphoresis, fatigue, fever, headaches, joint swelling, myalgias, nausea, neck pain, numbness, sore throat, vertigo, vomiting or weakness. The symptoms are aggravated by walking. She has tried nothing for the symptoms.       Constitution: Negative for activity change, appetite change, chills, sweating, fatigue, fever and generalized weakness.   HENT:  Negative for ear pain, hearing loss, facial swelling, congestion, postnasal drip, sinus pain, sinus pressure, sore throat, trouble swallowing and voice change.    Neck: Negative for neck pain, neck stiffness and painful lymph nodes.   Cardiovascular:  Negative for chest pain, leg swelling, palpitations, sob on exertion and passing out.   Eyes:  Negative for eye discharge, eye pain, photophobia, vision loss, double vision and blurred vision. "   Respiratory:  Negative for chest tightness, cough, sputum production, bloody sputum, COPD, shortness of breath, stridor, wheezing and asthma.    Gastrointestinal:  Negative for abdominal pain, nausea, vomiting, constipation, diarrhea, bright red blood in stool, rectal bleeding, heartburn and bowel incontinence.   Genitourinary:  Negative for dysuria, frequency, urgency, urine decreased, flank pain, bladder incontinence and hematuria.   Musculoskeletal:  Negative for trauma, joint pain, joint swelling, abnormal ROM of joint, muscle cramps and muscle ache.   Skin:  Positive for rash, abrasion and skin thickening/induration. Negative for color change, pale and wound.   Allergic/Immunologic: Negative for seasonal allergies, asthma and immunocompromised state.   Neurological:  Negative for dizziness, history of vertigo, light-headedness, passing out, facial drooping, speech difficulty, coordination disturbances, loss of balance, headaches, disorientation, altered mental status, loss of consciousness, numbness, tingling and seizures.   Hematologic/Lymphatic: Negative for swollen lymph nodes, easy bruising/bleeding and trouble clotting. Does not bruise/bleed easily.   Psychiatric/Behavioral:  Negative for altered mental status and disorientation.       Objective:     Physical Exam   Constitutional: She appears well-developed. She is cooperative.  Non-toxic appearance. She does not appear ill. No distress.   HENT:   Head: Normocephalic and atraumatic.   Ears:   Right Ear: Hearing, external ear and ear canal normal.   Left Ear: Hearing, external ear and ear canal normal.   Nose: Nose normal.   Mouth/Throat: Uvula is midline, oropharynx is clear and moist and mucous membranes are normal. Mucous membranes are moist. No posterior oropharyngeal edema. No tonsillar exudate. Oropharynx is clear.   Eyes: Conjunctivae, EOM and lids are normal. Pupils are equal, round, and reactive to light. Right eye exhibits no discharge. Left  eye exhibits no discharge. Extraocular movement intact   Neck: Neck supple. No neck rigidity present.   Cardiovascular: Normal rate, regular rhythm and normal pulses.   Pulmonary/Chest: Effort normal. No accessory muscle usage. No respiratory distress. She has no wheezes. She exhibits no tenderness.   Abdominal: Normal appearance. She exhibits no distension. Soft. There is no abdominal tenderness.   Musculoskeletal: Normal range of motion.         General: Normal range of motion.      Right lower leg: No edema.      Left lower leg: No edema.      Comments: Moves all extremities with normal tone, strength, and ROM.   Neurological: no focal deficit. She is alert and at baseline. She has normal motor skills and normal sensation. She displays no weakness, facial symmetry and normal reflexes. No cranial nerve deficit or sensory deficit. She exhibits normal muscle tone. Gait and coordination normal. Coordination normal.   Skin: Skin is warm, dry, not diaphoretic and no rash. Capillary refill takes less than 2 seconds.         Comments: Bilateral plantar aspect of the feet with thick keratinizing skin and areas of cracking with tenderness to palpation.  No induration, drainage, or erythema.   Psychiatric: Her behavior is normal. Mood and thought content normal.   Nursing note and vitals reviewed.          Assessment:     1. Acquired plantar keratoderma    2. Rash of both feet    3. Abrasion    4. Need for Tdap vaccination      Note dictated with voice recognition software, please excuse any grammatical errors.    Patient presents with clinical exam findings and history consistent with above.     On exam, patient is nontoxic appearing and vitals are stable.  Patient is essentially neurovascularly intact on exam.      Diagnostic testing results were independently reviewed and interpreted, which were discussed in depth with patient.   Test ordered in clinic: no  Tdap given in clinic since she is out of date and has bilateral  feet abrasion.  Additionally, previous progress notes/admissions/lab were reviewed and interpreted.  CMP 08/17/2023 with Good kidney function and EGFR.  Podiatry progress note 03/28/2023 reviewed.      Plan:     Treat conservatively with ammonium lactate cream to feet.  Definitively, she will need to be evaluated by Dermatology.  Referral placed.    Acquired plantar keratoderma  -     ammonium lactate 12 % Crea; apply TOPICALLY to affected area twice daily, rub in thoroughly until rash disappears  Dispense: 140 g; Refill: 0  -     Ambulatory referral/consult to Dermatology    Rash of both feet  -     Ambulatory referral/consult to Dermatology    Abrasion  -     (In Office Administered) Tdap Vaccine    Need for Tdap vaccination  -     (In Office Administered) Tdap Vaccine      Note dictated with voice recognition software, please excuse any grammatical errors.    We had shared decision making for patient's treatment. We discussed side effects/alternatives/benefits/risk and patient would like to proceed with treatment plan. We also discussed other OTC treatment recommendations.    Patient was counseled expected course and answered all of questions.     Patient was instructed to return for re-evaluation with urgent care or PCP for continued outpatient workup and management if symptoms do not improve/worsening symptoms. Strict ED versus clinic precautions given in depth.   Guideline, discharge and follow-up instructions given verbally/printed; patient will also receive via [email or web address]. Patient verbalized understanding and agreed with the entirety of plan of care.         Additional MDM:     Heart Failure Score:   COPD = No        Patient Instructions   PLEASE READ YOUR DISCHARGE INSTRUCTIONS ENTIRELY AS IT CONTAINS IMPORTANT INFORMATION.  Please return here or go to the Emergency Department for any concerns or worsening of condition (worsening rash, difficulty swallowing, shortness of breath, passing out).    Apply ammonium  lactate 12% lotion to feet twice daily until rash disappears.  You will need to be evaluated by dermatologist to cure this condition. -Please call [facility] scheduling center @[phone] to set up appointment for PCP/specialty clinic for continued workup and management.  Referral was placed for evaluation with Dermatology.  Basic skin care:  Apply moisturizing lotion such as Aquaphor, Eucerin or Aveeno eczema.    Please return or see your primary care doctor or Dermatology if you develop new or worsening symptoms.     -You must understand that you've received an Urgent Care treatment only and that you may be released before all your medical problems are known or treated. You, the patient, will arrange for follow up care as instructed. Please arrange follow up with your primary medical clinic within 2-5 days if your signs and symptoms have not resolved or worsen.   - Follow up with your PCP or specialty clinic as directed.  You can call [phone] or [phone] to schedule an appointment with the appropriate provider.    - If your condition worsens or fails to improve we recommend that you receive another evaluation at the emergency room immediately or contact your primary medical clinic to discuss your concerns in next 2-5 days.  Strict ER versus clinic precautions given.      RED FLAGS/WARNING SYMPTOMS DISCUSSED WITH PATIENT THAT WOULD WARRANT EMERGENT MEDICAL ATTENTION. Patient aware and verbalized understanding.

## 2023-08-19 NOTE — PATIENT INSTRUCTIONS
PLEASE READ YOUR DISCHARGE INSTRUCTIONS ENTIRELY AS IT CONTAINS IMPORTANT INFORMATION.  Please return here or go to the Emergency Department for any concerns or worsening of condition (worsening rash, difficulty swallowing, shortness of breath, passing out).    Apply ammonium lactate 12% lotion to feet twice daily until rash disappears.  You will need to be evaluated by dermatologist to cure this condition. -Please call [facility] scheduling center @[phone] to set up appointment for PCP/specialty clinic for continued workup and management.  Referral was placed for evaluation with Dermatology.  Basic skin care:  Apply moisturizing lotion such as Aquaphor, Eucerin or Aveeno eczema.    Please return or see your primary care doctor or Dermatology if you develop new or worsening symptoms.     -You must understand that you've received an Urgent Care treatment only and that you may be released before all your medical problems are known or treated. You, the patient, will arrange for follow up care as instructed. Please arrange follow up with your primary medical clinic within 2-5 days if your signs and symptoms have not resolved or worsen.   - Follow up with your PCP or specialty clinic as directed.  You can call [phone] or [phone] to schedule an appointment with the appropriate provider.    - If your condition worsens or fails to improve we recommend that you receive another evaluation at the emergency room immediately or contact your primary medical clinic to discuss your concerns in next 2-5 days.  Strict ER versus clinic precautions given.      RED FLAGS/WARNING SYMPTOMS DISCUSSED WITH PATIENT THAT WOULD WARRANT EMERGENT MEDICAL ATTENTION. Patient aware and verbalized understanding.

## 2023-08-21 ENCOUNTER — PATIENT MESSAGE (OUTPATIENT)
Dept: FAMILY MEDICINE | Facility: CLINIC | Age: 33
End: 2023-08-21
Payer: COMMERCIAL

## 2023-08-21 ENCOUNTER — OFFICE VISIT (OUTPATIENT)
Dept: DERMATOLOGY | Facility: CLINIC | Age: 33
End: 2023-08-21
Payer: COMMERCIAL

## 2023-08-21 DIAGNOSIS — L30.9 ECZEMA, UNSPECIFIED TYPE: Primary | ICD-10-CM

## 2023-08-21 DIAGNOSIS — L26 KERATOLYSIS EXFOLIATIVA: ICD-10-CM

## 2023-08-21 PROCEDURE — 1159F MED LIST DOCD IN RCRD: CPT | Mod: CPTII,S$GLB,, | Performed by: DERMATOLOGY

## 2023-08-21 PROCEDURE — 99204 OFFICE O/P NEW MOD 45 MIN: CPT | Mod: S$GLB,,, | Performed by: DERMATOLOGY

## 2023-08-21 PROCEDURE — 1159F PR MEDICATION LIST DOCUMENTED IN MEDICAL RECORD: ICD-10-PCS | Mod: CPTII,S$GLB,, | Performed by: DERMATOLOGY

## 2023-08-21 PROCEDURE — 3044F HG A1C LEVEL LT 7.0%: CPT | Mod: CPTII,S$GLB,, | Performed by: DERMATOLOGY

## 2023-08-21 PROCEDURE — 99999 PR PBB SHADOW E&M-EST. PATIENT-LVL III: ICD-10-PCS | Mod: PBBFAC,,, | Performed by: DERMATOLOGY

## 2023-08-21 PROCEDURE — 99999 PR PBB SHADOW E&M-EST. PATIENT-LVL III: CPT | Mod: PBBFAC,,, | Performed by: DERMATOLOGY

## 2023-08-21 PROCEDURE — 99204 PR OFFICE/OUTPT VISIT, NEW, LEVL IV, 45-59 MIN: ICD-10-PCS | Mod: S$GLB,,, | Performed by: DERMATOLOGY

## 2023-08-21 PROCEDURE — 3044F PR MOST RECENT HEMOGLOBIN A1C LEVEL <7.0%: ICD-10-PCS | Mod: CPTII,S$GLB,, | Performed by: DERMATOLOGY

## 2023-08-21 RX ORDER — TRIAMCINOLONE ACETONIDE 1 MG/G
OINTMENT TOPICAL
Qty: 60 G | Refills: 1 | Status: SHIPPED | OUTPATIENT
Start: 2023-08-21

## 2023-08-21 NOTE — PATIENT INSTRUCTIONS
Today's Plan:    At night:  1st Urea cream (bare urea 20% or 40 % (amazon) affected areas  2nd steroid cream/ointment toaffected areas  3rd occlusion to affected areas with socks (can wet the socks)   AM:  1st Urea cream  2nd steroid cream/ointment followed by socks    To help with pressure consider gel insoles by dr. Hager     Once scale starts to loosen, its okay to take hydrogen peroxide gauze let soak and then try picking off scale      Throughout the day use urea cream    Rifampin Pregnancy And Lactation Text: This medication is Pregnancy Category C and it isn't know if it is safe during pregnancy. It is also excreted in breast milk and should not be used if you are breast feeding.

## 2023-08-21 NOTE — PROGRESS NOTES
Subjective:      Patient ID:  Evelin Taylor is a 32 y.o. female who presents for   Chief Complaint   Patient presents with    Rash     X 2 weeks feet      Rash - Initial  Affected locations: right foot and left foot  Duration: 2 weeks  Signs / symptoms: itching, tender and swelling  Severity: moderate  Timing: constant  Aggravated by: pressure      Review of Systems   Constitutional:  Negative for fever, chills, fatigue and malaise.   Skin:  Positive for itching, rash and dry skin.       Objective:   Physical Exam   Constitutional: She appears well-developed and well-nourished. No distress.   Neurological: She is alert and oriented to person, place, and time. She is not disoriented.   Psychiatric: She has a normal mood and affect.   Skin:   Areas Examined (abnormalities noted in diagram):   Nails and Digits Inspection Performed           Diagram Legend     Erythematous scaling macule/papule c/w actinic keratosis       Vascular papule c/w angioma      Pigmented verrucoid papule/plaque c/w seborrheic keratosis      Yellow umbilicated papule c/w sebaceous hyperplasia      Irregularly shaped tan macule c/w lentigo     1-2 mm smooth white papules consistent with Milia      Movable subcutaneous cyst with punctum c/w epidermal inclusion cyst      Subcutaneous movable cyst c/w pilar cyst      Firm pink to brown papule c/w dermatofibroma      Pedunculated fleshy papule(s) c/w skin tag(s)      Evenly pigmented macule c/w junctional nevus     Mildly variegated pigmented, slightly irregular-bordered macule c/w mildly atypical nevus      Flesh colored to evenly pigmented papule c/w intradermal nevus       Pink pearly papule/plaque c/w basal cell carcinoma      Erythematous hyperkeratotic cursted plaque c/w SCC      Surgical scar with no sign of skin cancer recurrence      Open and closed comedones      Inflammatory papules and pustules      Verrucoid papule consistent consistent with wart     Erythematous eczematous  patches and plaques     Dystrophic onycholytic nail with subungual debris c/w onychomycosis     Umbilicated papule    Erythematous-base heme-crusted tan verrucoid plaque consistent with inflamed seborrheic keratosis     Erythematous Silvery Scaling Plaque c/w Psoriasis     See annotation        Assessment / Plan:        Eczema, unspecified type  -     triamcinolone acetonide 0.1% (KENALOG) 0.1 % ointment; aaa bid after moisturizing. Not more than 2 weeks straight in same location. Avoid face and groin  Dispense: 60 g; Refill: 1  Today's Plan:    At night:  1st Urea cream (bare urea 20% or 40 % (amazon) affected areas  2nd steroid cream/ointment toaffected areas  3rd occlusion to affected areas with socks (can wet the socks)   AM:  1st Urea cream  2nd steroid cream/ointment followed by socks    To help with pressure consider gel insoles by dr. Hager     Once scale starts to loosen, its okay to take hydrogen peroxide gauze let soak and then try picking off scale      Throughout the day use urea cream     For ketoconazole shampoo, use 1-2 x a week, counseled patient it may dry out hair or cause scalp irritation, okay to stop, when using let sit 5 minutes then rinse off, okay to follow with normal shampoo and conditioner, if causes dryness or irritation okay to stop    Keratolysis exfoliativa      F/u 3 mo           No follow-ups on file.

## 2023-08-22 ENCOUNTER — PATIENT MESSAGE (OUTPATIENT)
Dept: DERMATOLOGY | Facility: CLINIC | Age: 33
End: 2023-08-22
Payer: COMMERCIAL

## 2023-08-25 ENCOUNTER — PATIENT MESSAGE (OUTPATIENT)
Dept: DERMATOLOGY | Facility: CLINIC | Age: 33
End: 2023-08-25
Payer: COMMERCIAL

## 2023-09-20 ENCOUNTER — TELEPHONE (OUTPATIENT)
Dept: SURGERY | Facility: CLINIC | Age: 33
End: 2023-09-20
Payer: COMMERCIAL

## 2023-09-21 ENCOUNTER — TELEPHONE (OUTPATIENT)
Dept: BARIATRICS | Facility: CLINIC | Age: 33
End: 2023-09-21
Payer: COMMERCIAL

## 2023-09-21 NOTE — TELEPHONE ENCOUNTER
----- Message from Kristen Abdi sent at 9/21/2023  9:31 AM CDT -----  Regarding: Appointment  Contact: [phone]  Calling to schedule an appointment  as soon as possible. Please call patient to schedule today.

## 2023-09-21 NOTE — TELEPHONE ENCOUNTER
Scheduled pt for medical wl. Reviewed hospital location and appts date and time was agreed upon with pt.

## 2023-11-10 ENCOUNTER — TELEPHONE (OUTPATIENT)
Dept: DERMATOLOGY | Facility: CLINIC | Age: 33
End: 2023-11-10
Payer: COMMERCIAL

## 2023-11-10 NOTE — TELEPHONE ENCOUNTER
Left message for patient that her appointment on 11/14/23 with Dr. Calix has been canceled due to she is out on medical leave.  Patient to call back to reschedule.

## 2023-11-28 ENCOUNTER — OFFICE VISIT (OUTPATIENT)
Dept: BARIATRICS | Facility: CLINIC | Age: 33
End: 2023-11-28
Payer: COMMERCIAL

## 2023-11-28 VITALS
BODY MASS INDEX: 41.88 KG/M2 | SYSTOLIC BLOOD PRESSURE: 126 MMHG | HEART RATE: 78 BPM | DIASTOLIC BLOOD PRESSURE: 79 MMHG | WEIGHT: 236.38 LBS | OXYGEN SATURATION: 97 % | HEIGHT: 63 IN

## 2023-11-28 DIAGNOSIS — E66.01 OBESITY, CLASS III, BMI 40-49.9 (MORBID OBESITY): Primary | ICD-10-CM

## 2023-11-28 PROCEDURE — 3074F PR MOST RECENT SYSTOLIC BLOOD PRESSURE < 130 MM HG: ICD-10-PCS | Mod: CPTII,S$GLB,, | Performed by: INTERNAL MEDICINE

## 2023-11-28 PROCEDURE — 1160F PR REVIEW ALL MEDS BY PRESCRIBER/CLIN PHARMACIST DOCUMENTED: ICD-10-PCS | Mod: CPTII,S$GLB,, | Performed by: INTERNAL MEDICINE

## 2023-11-28 PROCEDURE — 1159F MED LIST DOCD IN RCRD: CPT | Mod: CPTII,S$GLB,, | Performed by: INTERNAL MEDICINE

## 2023-11-28 PROCEDURE — 3078F PR MOST RECENT DIASTOLIC BLOOD PRESSURE < 80 MM HG: ICD-10-PCS | Mod: CPTII,S$GLB,, | Performed by: INTERNAL MEDICINE

## 2023-11-28 PROCEDURE — 99999 PR PBB SHADOW E&M-EST. PATIENT-LVL IV: CPT | Mod: PBBFAC,,, | Performed by: INTERNAL MEDICINE

## 2023-11-28 PROCEDURE — 99999 PR PBB SHADOW E&M-EST. PATIENT-LVL IV: ICD-10-PCS | Mod: PBBFAC,,, | Performed by: INTERNAL MEDICINE

## 2023-11-28 PROCEDURE — 99215 PR OFFICE/OUTPT VISIT, EST, LEVL V, 40-54 MIN: ICD-10-PCS | Mod: S$GLB,,, | Performed by: INTERNAL MEDICINE

## 2023-11-28 PROCEDURE — 3008F BODY MASS INDEX DOCD: CPT | Mod: CPTII,S$GLB,, | Performed by: INTERNAL MEDICINE

## 2023-11-28 PROCEDURE — 1159F PR MEDICATION LIST DOCUMENTED IN MEDICAL RECORD: ICD-10-PCS | Mod: CPTII,S$GLB,, | Performed by: INTERNAL MEDICINE

## 2023-11-28 PROCEDURE — 3044F PR MOST RECENT HEMOGLOBIN A1C LEVEL <7.0%: ICD-10-PCS | Mod: CPTII,S$GLB,, | Performed by: INTERNAL MEDICINE

## 2023-11-28 PROCEDURE — 99215 OFFICE O/P EST HI 40 MIN: CPT | Mod: S$GLB,,, | Performed by: INTERNAL MEDICINE

## 2023-11-28 PROCEDURE — 1160F RVW MEDS BY RX/DR IN RCRD: CPT | Mod: CPTII,S$GLB,, | Performed by: INTERNAL MEDICINE

## 2023-11-28 PROCEDURE — 3008F PR BODY MASS INDEX (BMI) DOCUMENTED: ICD-10-PCS | Mod: CPTII,S$GLB,, | Performed by: INTERNAL MEDICINE

## 2023-11-28 PROCEDURE — 3044F HG A1C LEVEL LT 7.0%: CPT | Mod: CPTII,S$GLB,, | Performed by: INTERNAL MEDICINE

## 2023-11-28 PROCEDURE — 3074F SYST BP LT 130 MM HG: CPT | Mod: CPTII,S$GLB,, | Performed by: INTERNAL MEDICINE

## 2023-11-28 PROCEDURE — 3078F DIAST BP <80 MM HG: CPT | Mod: CPTII,S$GLB,, | Performed by: INTERNAL MEDICINE

## 2023-11-28 RX ORDER — DIETHYLPROPION HYDROCHLORIDE 75 MG/1
75 TABLET, EXTENDED RELEASE ORAL DAILY
Qty: 30 TABLET | Refills: 1 | Status: SHIPPED | OUTPATIENT
Start: 2023-11-28

## 2023-11-28 NOTE — PROGRESS NOTES
"Subjective     Patient ID: Evelin Taylor is a 32 y.o. female.    Chief Complaint: Consult    CC:    New pt to me, referred by Self, [facility]  No address on file , with Patient Active Problem List:     Cervical lymphadenitis     Ulcer (traumatic) of oral mucosa     Acute otitis externa of right ear     Fibroid       Current attempts at weight loss: Has cut back on fast food.  No exercise currently.     Previous diet attempts: Going to gym.     History of medication for loss:  checked today. Denies.     Heaviest weight: 240#    Lightest weight: 170#    Goal weight: 190#      Last eye exam:  last year. No glaucoma per pt.   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 Lives with masoud and esperanza. Pt cooks.   Breakfast:  eggs on haiwain roll. Raisin bread. Weekends: skips.     lunch: chicken stew, red beans, pasta. Weekends: same.     dinner: same as lunch. If out- seafood platter or pasta.     snacks: chips.     Beverages: sprite, sports drinks, koolaid. ETOH- denies.     Willingness to change: 10/10    Cardiac studies:   Normal sinus rhythm   Moderate voltage criteria for LVH, may be normal variant   Inferior infarct ,age undetermined   Abnormal ECG   No previous ECGs available       BMR:1642    PBF:  45.1%      Review of Systems       Objective   /79 (BP Location: Right arm, Patient Position: Sitting)   Pulse 78   Ht 5' 3" (1.6 m)   Wt 107.2 kg (236 lb 6.4 oz)   SpO2 97%   BMI 41.88 kg/m²     Physical Exam  Vitals reviewed.   Constitutional:       General: She is not in acute distress.     Appearance: She is well-developed.      Comments: With severe obesity     HENT:      Head: Normocephalic and atraumatic.   Eyes:      General: No scleral icterus.     Pupils: Pupils are equal, round, and reactive to light.   Neck:      Thyroid: No thyromegaly.   Cardiovascular:      Rate and Rhythm: Normal rate.      Heart sounds: Normal heart sounds. No murmur heard.     No " friction rub. No gallop.   Pulmonary:      Effort: Pulmonary effort is normal. No respiratory distress.      Breath sounds: Normal breath sounds. No wheezing.   Abdominal:      General: Bowel sounds are normal. There is no distension.      Palpations: Abdomen is soft.      Tenderness: There is no abdominal tenderness.   Musculoskeletal:         General: Normal range of motion.      Cervical back: Normal range of motion and neck supple.   Skin:     General: Skin is warm and dry.      Findings: No erythema.   Neurological:      Mental Status: She is alert and oriented to person, place, and time.      Cranial Nerves: No cranial nerve deficit.   Psychiatric:         Behavior: Behavior normal.         Judgment: Judgment normal.            Assessment and Plan     1. Obesity, Class III, BMI 40-49.9 (morbid obesity)  -     diethylpropion (TENUATE) 75 mg SR tablet; Take 1 tablet (75 mg total) by mouth once daily.  Dispense: 30 tablet; Refill: 1        1. Obesity, Class III, BMI 40-49.9 (morbid obesity)  Medical and surgical options discussed today. Seminar info given.     - diethylpropion (TENUATE) 75 mg SR tablet; Take 1 tablet (75 mg total) by mouth once daily.  Dispense: 30 tablet; Refill: 1    Patient warned of common side effects of diethylpropion including anxiety, insomnia, palpitations and increased blood pressure. It was also explained that it is for short-term usage along with diet and exercise, and that stopping the medication without making lifestyle changes will result in regain of weight. Patient states understanding.    Weight loss medications are controlled substances.  They require routine follow up. Prescription or pills that are lost or destroyed will not be replaced.       Increase low impact activity as tolerated.  Avoid high impact activity, very heavy lifting or other exercise regimens that may cause discomfort.       Add some type of resistance training 2-3 days a week. These can be body weight  exercises, light weight or elastic bands. [employer] and [employer] are great sources for free work out plans and videos.     No soda, sweet tea, juices or lemonade. All drinks should be 5 calories or less.       1200 jesus alberto pb meal planner, meal ideas and exercise handouts given       No follow-ups on file.

## 2023-11-28 NOTE — PATIENT INSTRUCTIONS
Patient warned of common side effects of diethylpropion including anxiety, insomnia, palpitations and increased blood pressure. It was also explained that it is for short-term usage along with diet and exercise, and that stopping the medication without making lifestyle changes will result in regain of weight. Patient states understanding.    Weight loss medications are controlled substances.  They require routine follow up. Prescription or pills that are lost or destroyed will not be replaced.     If you are interested in bariatric surgery we will need you to  attend an  online seminar. If you choose to view the online seminar please go to: [email or web address] . The seminar is best viewed on a desktop or laptop computer. Upon completion of the seminar on the last slide you will see the code word comprised of letters and numbers in red and you should jot them down as youll need them to enter into the required form. On that same page youll see the link which will take you to the form. Please enter all the information required, including the code word. You will receive an email confirmation and so will we. Upon receiving this letter you will be called so that your appointments can be arranged. There is also two links for you to print out two packets of information. One is the patient information packet and the other is the nutrition worksheet. Please complete them and bring to your next appointment in our department.         Check the website www.[employer] for price comparisons and discounts on medications.    After Jan 1: Check on your insurance's formulary (drug plan) web site to see if Wegovy (semaglutide 2.4 mg) is covered. You may also go to [email or web address] to check coverage, but that information may not be as accurate.   Check for coverage for Zepbound (terzepatide  5, 10 and 15 mg).  These particular name brands are important. They are not the same as the diabetes  "brands, and will not be covered the same.       Increase low impact activity as tolerated.  Avoid high impact activity, very heavy lifting or other exercise regimens that may cause discomfort.       Add some type of resistance training 2-3 days a week. These can be body weight exercises, light weight or elastic bands. [employer] and [employer] are great sources for free work out plans and videos.     No soda, sweet tea, juices or lemonade. All drinks should be 5 calories or less.                 1200 calorie  Meal Plan  STARCHES 80 CALORIES PER SERVING 15g CARB, 3g PROTEIN, 1g FAT  Servings per day   bread   tortilla   crackers   cooked cereals   dry cereals   pasta   rice   corn   popcorn   potato (small)   potato, mashed   sweet potato   squash, winter   cooked beans, peas, lentils (add 1 meat exchange)   1 slice   1 (6")   4-6 (3/4 oz)   1/2 cup   3/4 cup   1/2 cup   1/3 cup  1/2 cup   1 small light bag  1 (3 oz)   1/2 cup   1/3 cup   1 cup   1/2 cup Most starches are a good source of B vitamins   Choose whole grain foods such as 100% whole wheat bread and flour, brown rice, tortillas, etc. for nutrients and fiber.   Combine beans (starch & meat) with grains (starch) for their complimentary proteins and fiber   Combine grains (starch) with milk (milk) or cheese (meat) to compliment proteins     3   FRUIT 60 CALORIES PER SERVING 15g CARB    fresh fruit   banana  melon (cubes)   berries  canned fruit   dried fruit    1 small   1/2  ½ cup   ¾ cup  ½ cup   ¼ cup    Choose whole fruits for fiber   No fruit juices   2   DAIRY  CALORIES PER SERVING 12g CARB, 8g PROTEIN, 0-8g FAT    milk   yogurt  Protein soy or almond milk 1 cup   1 cup   1 cup Use unsweetened almond or soy milk with added protein  Avoid chocolate or flavored milk  Avoid yogurt with more than 8 gms of sugar. Gms of protein should be higher than grams of sugar               2   MEAT AND SUBSTITUES  CALORIES PER SERVING 7g Protein, 0-13g FAT  "   Seafood and fish   cheese   cottage cheese   egg   peanut butter   tofu or tempeh  cooked beans, peas, lentils (add 1 starch)  Quinoa (add 1 starch)   Nuts and seeds (½ serving protein + 1 fat)  Nutritional yeast  Morning Star grillers 1 oz     1 oz  1/4 cup     1   1.5 Tbsp   4 oz (1/2 cup)   1/2 cup              ¼ cup            2 tablespoons    1 cindy or ½ cup      Choose fish, seafood and lower fat cheeses  Limit frying or adding fat.      9   FATS 45 CALORIES PER SERVING     oil   mayonnaise   cream cheese   salad dressing   peanuts   avocado   butter or margarine    1 tsp   1 tsp   1 Tbsp   1 Tbsp   10   1/8   1 tsp Eat less fat.   Eat less saturated fat such as animal fat found in fatter meat, cheese, and butter. Also eat less hydrogenated fat.      2-3   VEGETABLES 25 CALORIES PER SERVING 5 g CARB, 2g PROTEIN    raw vegetables   cooked vegetables   tomato or vegetable juice 1 cup   1/2 cup     1/2 cup Choose dark green leafy and deep yellow vegetables such as spinach, zuchinni, squash, mushrooms, cauliflower ,broccoli, carrots, and peppers.   Unlimited       1200 CALORIE MEAL PLAN  Eat 3 small meals per day with 1-2 small snacks to keep you full throughout the day  Aim for at least 15 gms of protein at breakfast, at least 25 grams at lunch and at least 25 grams of protein at dinner.  Snacks should contain at least 5 grams of protein  Limit starches to 1 serving per meal or snack.  Keep your carbs whole grain or whole wheat  Limit your intake of refined sugar including sugary beverages ie sweet tea, lemonade, fruit punch             Fruit Protein Dairy Starch Fats Calories   Breakfast 1 2 1 1  340   Lunch  3  1 1 290   Dinner 1 4  1 1 405   Snack   1 1  170   Total 2 9 2 4 2 1205     Sample breakfasts:  2 scrambled eggs (use spray), 1 cup Protein Silk soy milk, 1 slice whole wheat toast, 1 small orange  Omelet made with 1 egg, 1-ounce low fat cheese and non-starchy veggies, 1 low fat plain yogurt with ½  banana  Plain yogurt with ¾ cup unsweetened cold cereal and ½ cup fresh fruit salad, 2 hardboiled eggs  Sample lunches:  ½ whole wheat bagel topped with 3 ounces of low fat cheese melted in oven with a wild green salad with 1 TBSP dressing  ¾ cup cooked beans with 6 whole grain crackers, 10 roasted peanuts  ½ medium baked potato with 3 ounces of low fat cheddar cheese and 1 TBSP  sour cream  1 small wheat tortilla brushed with 1 tsp olive oil then brushed with pizza sauce and 4 ounces low fat cheese, sliced mushrooms and green peppers broiled until cheese is melted.  ½ cup chopped melon    Sample Dinners:  4 ounces of tuna pan seared in 1 tsp olive oil, ½ baked small sweet potato and 2 small plums  ½ cup chick peas, 1 cup cauliflower sauteed with 1 tbsp chopped onion, 1 tsp oil, and 2 tsp obando powder. Add low sodium vegetable stock to desired consistency. Serve with ½ cup brown rice  Red beans seasoned with onions and bell peppers served over cauliflower rice  1 cup cooked green or brown lentils served with ½ cup cooked quinoa and chopped tomatoes and cucumbers and 1 tbsp feta cheese  Sample Snacks:  1 cup of Protein Silk Soy milk with 3 squares livia crackers  3/4 ounce Triscuits with 1 ounce cubed cheese  Chobani Triple Zero yogurt with ¾ cup unsweetened cereal  ½ cup edamame (shelled)      Meal Ideas for Regular Bariatric Diet  *Recipes and products available at [email or web address]      Breakfast: (15-20g protein)    - Egg white omelet: 2 egg whites or ½ cup Egg Beaters. (Optional proteins: cheese, shrimp, black beans, chicken, sliced turkey) (Optional veggies: tomatoes, salsa, spinach, mushrooms, onions, green peppers, or small slice avocado)     - Egg and sausage: 1 egg or ¼ cup Egg Beaters (any variety), with 1 cindy or 2 links of Turkey sausage or Veggie breakfast sausage ([employer]ms or Boca)    - Crust-less breakfast quiche: To make a glass pie dish, mix 4oz part skim Ricotta, 1 cup skim milk,  and 2 eggs as your base. Add protein: shredded cheese, sliced lean ham or turkey, turkey ferreira/sausage. Add veggies: tomato, onion, green onion, mushroom, green pepper, spinach, etc.    - Yogurt parfait: Mix 1 - 6oz container Dannon Light N Fit vanilla yogurt, with ¼ cup Kashi Go Lean cereal    - Cottage cheese and fruit: ½ cup part-skim cottage cheese or ricotta cheese topped with fresh fruit or sugar free preserves     - Laura Vazquez's Vanilla Egg custard* (add 2 Tbsp instant coffee granules to make Cappuccino Custard*)    - Hi-Protein café latte (skim milk, decaf coffee, 1 scoop protein powder). Optional to add Sugar free syrup or extract flavoring.    Lunch: (20-30g protein)    - ½ cup Black bean soup (Homemade or Progresso), with ¼ cup shredded low-fat cheese. Top with chopped tomato or fresh salsa.     - Lean deli turkey breast and low-fat sliced cheese, mustard or light thomas to moisten, rolled up together, or wrapped in a Pavel lettuce leaf    - Chicken salad made from dinner leftovers, moisten with low-fat salad dressing or light thomas. Also try leftover salmon, shrimp, tuna or boiled eggs. Serve ½ cup over dark green salad    - Fat-free canned refried beans, topped with ¼ cup shredded low-fat cheese. Top with chopped tomato or fresh salsa.     - Greek salad: Top mixed greens with 1-2oz grilled chicken, tomatoes, red onions, 2-3 kalamata olives, and sprinkle lightly with feta cheese. Spritz with Balsamic vinegar to taste.     - Crust-less lunch quiche: To make a glass pie dish, mix 4oz part skim Ricotta, 1 cup skim milk, and 2 eggs as your base. Add protein: shredded cheese, sliced lean ham or turkey, shrimp, chicken. Add veggies: tomato, onion, green onion, mushroom, green pepper, spinach, artichoke, broccoli, etc.    - Pizza bake: tomato sauce, low-fat shredded mozzarella and turkey pepperoni or Martiniquais ferreira. Add any veggies.    - Cucumber crab bites: Spread ¼ cup crab dip (lump crabmeat + light cream  cheese and green onions) over sliced cucumber.     - Chicken with light spinach and artichoke dip*: Puree in : 6oz cooked and drained spinach, 2 cloves garlic, 1 can cannelloni beans, ½ cup chopped green onions, 1 can drained artichoke hearts (not marinated in oil), lemon juice and basil. Mix in 2oz chopped up chicken.    Supper: (20-30g protein)    - Serve grilled fish over dark green salad tossed with low-fat dressing, served with grilled asparagus ko     - Rotisserie chicken salad: served with sliced strawberries, walnuts, fat-free feta cheese crumbles and 1 tbsp Cruzs Own Light Raspberry [place] Vinaigrette    - Shrimp cocktail: Dip cold boiled shrimp in homemade low-sugar cocktail sauce (1/2 cup Jen One Carb ketchup, 2 tbsp horseradish, 1/4 tsp hot sauce, 1 tsp Worcestershire sauce, 1 tbsp freshly-squeezed lemon juice). Serve with dark green salad, walnuts, and crumbled blue cheese drizzled with olive oil and Balsamic vinegar    - Tuna Melt: Spread tuna salad onto 2 thick slices of tomato. Top with low-fat cheese and broil until cheese is melted. May also be made with chicken salad of shrimp salad. [place] with different types of cheeses.    - Homemade low-fat Chili using extra lean ground beef or ground turkey. Top with shredded cheese and salsa as desired. May add dollop fat-free sour cream if desired    - Dinner Omelet with shrimp or chicken and onion, green peppers and chives.    - No noodle lasagna: Use sliced zucchini or eggplant in place of noodles.  Layer with part skim ricotta cheese and low sugar meat sauce (use very lean ground beef or ground turkey).    - Mexican chicken bake: Bake chunks of chicken breast or thigh with taco seasoning, Pace brand enchilada sauce, green onions and low-fat cheese. Serve with ¼ cup black beans or fat free refried beans topped with chopped tomatoes or salsa.    - Prashant frozen meatballs, simmered in Classico Marinara sauce. Different flavors  of salsa or spaghetti sauce create different dishes! Sprinkle with parmesan cheese. Serve with grilled or steamed veggies, or a dark green salad.    - Simmer boneless skinless chicken thigh chunks in Classico Marinara sauce or roasted salsa until tender with chopped onion, bell pepper, garlic, mushrooms, spinach, etc.     - Hamburger, without the bun, dressed the way you like. Served with grilled or steamed veggies.    - Eggplant parmesan: Bake slices of eggplant at 350 degrees for 15 minutes. Layer tomato sauce, sliced eggplant and low-fat mozzarella cheese in a baking dish and cover with foil. Bake 30-40 more minutes or until bubbly. Uncover and bake at 400 degrees for about 15 more minutes, or until top is slightly crisp.    - Fish tacos: grilled/baked white fish, wrapped in Pavel lettuce leaf, topped with salsa, shredded low-fat cheese, and light coleslaw.    Snacks: (100-200 calories; >5g protein)    - 1 low-fat cheese stick with 8 cherry tomatoes or 1 serving fresh fruit  - 4 thin slices fat-free turkey breast and 1 slice low-fat cheese  - 4 thin slices fat-free honey ham with wedge of melon  - 1/4 cup unsalted nuts with ½ cup fruit  - 6-oz container Dannon Light n Fit vanilla yogurt, topped with 1oz unsalted nuts         - apple, celery or baby carrots spread with 2 Tbsp natural peanut butter or almond butter   - apple slices with 1 oz slice low-fat cheese  - celery, cucumber, bell pepper or baby carrots dipped in ¼ cup hummus bean spread or light spinach and artichoke dip (*recipe in lunch section)  - 100 calorie bag microwave light popcorn with 3 tbsp grated parmesan cheese  - Donn Links Beef Steak - 14g protein! (similar to beef jerky)  - 2 wedges Laughing Cow - Light Herb & Garlic Cheese with sliced cucumber or green bell pepper  - 1/2 cup low-fat cottage cheese with ¼ cup fruit or ¼ cup salsa  - RTD Protein drinks: Atkins, Low Carb Slim Fast, EAS light, Muscle Milk Light, etc.  - Homemade Protein  drinks: C Soy95, Isopure, Nectar, UNJURY, Whey Gourmet, etc. Mix 1 scoop powder with 8oz skim/1% milk or light soymilk.  - Protein bars: Atkins, EAS, Pure Protein, Think Thin, Detour, etc. Must have 0-4 grams sugar - Read the label.    Takeout Options: No more than twice/week  Deli - Salads (no pasta or rice), meats, cheeses. Roasted chicken. Lox (salmon)    Mexican - Platters which don't include tortillas, chips, or rice. Go easy on the beans. Example: Fajitas without the tortillas. Ask the  not to bring chips to the table if they are too tempting.    Greek - Meat or fish and vegetable, but no bread or rice. Including hummus, baba ganoush, etc, is OK. Most sit-down Greek restaurants can provide you with cucumber slices for dipping instead of nessa bread.    Fast Food (Avoid as much as possible) - Salads (no croutons and limit salad dressing to 2 tbsp), grilled chicken sandwich without the bun and ask for no thomas. Danielas low fat chili or Taco Bell pintos and cheese.    BBQ - The meats are fine if you ask for sauces on the side, but most of the traditional side dishes are loaded with carbs. Eladio slaw, baked beans and BBQ sauce are typically made with sugar.    Chinese - Nothing deep-fried, no rice or noodles. Many Chinese sauces have starch and sugar in them, so you'll have to use your judgement. If you find that these sauces trigger cravings, or cause Dumping, you can ask for the sauce to be made without sugar or just use soy sauce.      Exercise should be some cardiovascular and some resistance training(see below).      STRENGTHENING  An adequate resistance training program could include the following types of exercise, focusing on the major muscle groups. One can use 5 to 10 pound dumbbells for those exercises that require the use of weight. At home, one can use a household item that provides a comfortable weight, such as a milk carton or beverage container. These exercises can also be performed without  weights. Add some type of resistance training 2-3 days a week. These can be body weight exercises, light weight or elastic bands. [employer] and [employer] are great sources for free work out plans and videos.       Chest (Bench Press): Hold the weights with your hands. Lying on a flat surface, with knees bent and feet flat, slowly bring the weights to the chest area with palms facing upward. Begin to exhale and press the weights up, fully extending the arms, and keeping them above your eyes. Inhale as you lower them to the starting position and repeat the movement.  Back (Bent-Over Row): Start by placing your feet shoulder-width apart.  a weight with each hand just outside the knees. Keeping the back straight and the knees flexed, slightly bend forward at the waist. Let the arms hang naturally while holding the weights. From this starting position, pull the weights to the lower abdomen, keeping the elbows close to the body. Exhale as you pull. Return to the starting position, inhaling as you go.  Arms (Bicep Curls): Hold a weight in each hand, with elbows at your sides, palms facing forward. The back should be straight, the chest flared outward. Begin to bend your right arm up first while exhaling, keeping the elbow totally stationary. Wait until the right arm goes completely down to the fully extended position, and then begin to curl the left arm. Each arm curled completes one full repetition.  Shoulders (Lateral Raises): Place the feet a few inches apart with the knees bent slightly. Keep the back erect as you lean forward slightly. With the weights in front of your thighs and palms facing together, begin to slowly raise them up to the side until parallel with the floor. Lower the weights to your thighs, and repeat.  Legs (Stiff Leg Dead Lift): Start by standing straight, holding the weights close to your sides, nearly touching your thighs. Keep the weights close to the body--this protects the back. The back must  "stay straight. Bend at the waist as far forward as you comfortably can while keeping your legs straight, and begin to feel the pull in your hamstrings as you lower the weights toward the ground. Slowly return to the starting position, keeping the back straight.  Legs (Dumbbell Lunge): Hold a weight in each hand, arms hanging at your sides. Step out with one leg, keeping the back straight. It is important to step out far enough so that the knee does not extend over the toe. This puts too much stress on the knee. Go down far enough so that the opposite knee nearly touches the ground. Keep this stance and repeat the lunging motion for several repetitions.  Abdominals: Do not perform standard sit-ups as these could hurt the lower back. Rather, focus on the following 2 exercises: (1) Obliques--Lie on your back with the knees flexed in the bent sit-up position. From this position, bring both knees down to the ground. With the back remaining flat, begin to flex your body toward your toes ("crunch"). Bring your shoulders up off the ground, but go slowly, controlling your momentum. Repeat this for 10 to 15 times. (2) Seated bench kicks/tacos knives--Sit on the end of a bench or chair with the hands placed behind the buttocks. Begin to kick the legs outward with the knees bent slightly--at the same time, lean back to extend the torso. Come back to the beginning position and repeat this motion 10 to 15 times.      "

## 2023-12-26 ENCOUNTER — OFFICE VISIT (OUTPATIENT)
Dept: FAMILY MEDICINE | Facility: CLINIC | Age: 33
End: 2023-12-26
Payer: COMMERCIAL

## 2023-12-26 VITALS
DIASTOLIC BLOOD PRESSURE: 74 MMHG | HEIGHT: 63 IN | OXYGEN SATURATION: 99 % | BODY MASS INDEX: 41.76 KG/M2 | WEIGHT: 235.69 LBS | HEART RATE: 96 BPM | TEMPERATURE: 99 F | SYSTOLIC BLOOD PRESSURE: 112 MMHG

## 2023-12-26 DIAGNOSIS — J11.1 FLU: Primary | ICD-10-CM

## 2023-12-26 LAB
CTP QC/QA: YES
CTP QC/QA: YES
FLUAV AG NPH QL: POSITIVE
FLUBV AG NPH QL: NEGATIVE
SARS-COV-2 RDRP RESP QL NAA+PROBE: NEGATIVE

## 2023-12-26 PROCEDURE — 3008F PR BODY MASS INDEX (BMI) DOCUMENTED: ICD-10-PCS | Mod: CPTII,S$GLB,, | Performed by: INTERNAL MEDICINE

## 2023-12-26 PROCEDURE — 3044F PR MOST RECENT HEMOGLOBIN A1C LEVEL <7.0%: ICD-10-PCS | Mod: CPTII,S$GLB,, | Performed by: INTERNAL MEDICINE

## 2023-12-26 PROCEDURE — 99999 PR PBB SHADOW E&M-EST. PATIENT-LVL IV: ICD-10-PCS | Mod: PBBFAC,,, | Performed by: INTERNAL MEDICINE

## 2023-12-26 PROCEDURE — 87804 INFLUENZA ASSAY W/OPTIC: CPT | Mod: QW,S$GLB,, | Performed by: INTERNAL MEDICINE

## 2023-12-26 PROCEDURE — 3074F PR MOST RECENT SYSTOLIC BLOOD PRESSURE < 130 MM HG: ICD-10-PCS | Mod: CPTII,S$GLB,, | Performed by: INTERNAL MEDICINE

## 2023-12-26 PROCEDURE — 3078F PR MOST RECENT DIASTOLIC BLOOD PRESSURE < 80 MM HG: ICD-10-PCS | Mod: CPTII,S$GLB,, | Performed by: INTERNAL MEDICINE

## 2023-12-26 PROCEDURE — 87804 POCT INFLUENZA A/B: ICD-10-PCS | Mod: QW,S$GLB,, | Performed by: INTERNAL MEDICINE

## 2023-12-26 PROCEDURE — 3074F SYST BP LT 130 MM HG: CPT | Mod: CPTII,S$GLB,, | Performed by: INTERNAL MEDICINE

## 2023-12-26 PROCEDURE — 99999 PR PBB SHADOW E&M-EST. PATIENT-LVL IV: CPT | Mod: PBBFAC,,, | Performed by: INTERNAL MEDICINE

## 2023-12-26 PROCEDURE — 3078F DIAST BP <80 MM HG: CPT | Mod: CPTII,S$GLB,, | Performed by: INTERNAL MEDICINE

## 2023-12-26 PROCEDURE — 87635: ICD-10-PCS | Mod: QW,S$GLB,, | Performed by: INTERNAL MEDICINE

## 2023-12-26 PROCEDURE — 3044F HG A1C LEVEL LT 7.0%: CPT | Mod: CPTII,S$GLB,, | Performed by: INTERNAL MEDICINE

## 2023-12-26 PROCEDURE — 1159F MED LIST DOCD IN RCRD: CPT | Mod: CPTII,S$GLB,, | Performed by: INTERNAL MEDICINE

## 2023-12-26 PROCEDURE — 99214 OFFICE O/P EST MOD 30 MIN: CPT | Mod: S$GLB,,, | Performed by: INTERNAL MEDICINE

## 2023-12-26 PROCEDURE — 3008F BODY MASS INDEX DOCD: CPT | Mod: CPTII,S$GLB,, | Performed by: INTERNAL MEDICINE

## 2023-12-26 PROCEDURE — 1159F PR MEDICATION LIST DOCUMENTED IN MEDICAL RECORD: ICD-10-PCS | Mod: CPTII,S$GLB,, | Performed by: INTERNAL MEDICINE

## 2023-12-26 PROCEDURE — 87635 SARS-COV-2 COVID-19 AMP PRB: CPT | Mod: QW,S$GLB,, | Performed by: INTERNAL MEDICINE

## 2023-12-26 PROCEDURE — 99214 PR OFFICE/OUTPT VISIT, EST, LEVL IV, 30-39 MIN: ICD-10-PCS | Mod: S$GLB,,, | Performed by: INTERNAL MEDICINE

## 2023-12-26 PROCEDURE — 1160F PR REVIEW ALL MEDS BY PRESCRIBER/CLIN PHARMACIST DOCUMENTED: ICD-10-PCS | Mod: CPTII,S$GLB,, | Performed by: INTERNAL MEDICINE

## 2023-12-26 PROCEDURE — 1160F RVW MEDS BY RX/DR IN RCRD: CPT | Mod: CPTII,S$GLB,, | Performed by: INTERNAL MEDICINE

## 2023-12-26 RX ORDER — FLUTICASONE PROPIONATE 50 MCG
2 SPRAY, SUSPENSION (ML) NASAL DAILY
Qty: 16 G | Refills: 2 | Status: SHIPPED | OUTPATIENT
Start: 2023-12-26

## 2023-12-26 RX ORDER — PROMETHAZINE HYDROCHLORIDE AND DEXTROMETHORPHAN HYDROBROMIDE 6.25; 15 MG/5ML; MG/5ML
5 SYRUP ORAL EVERY 6 HOURS PRN
Qty: 120 ML | Refills: 0 | Status: SHIPPED | OUTPATIENT
Start: 2023-12-26 | End: 2024-01-05

## 2023-12-26 RX ORDER — BENZONATATE 200 MG/1
200 CAPSULE ORAL 3 TIMES DAILY PRN
Qty: 30 CAPSULE | Refills: 0 | Status: SHIPPED | OUTPATIENT
Start: 2023-12-26 | End: 2024-01-05

## 2023-12-26 RX ORDER — OSELTAMIVIR PHOSPHATE 75 MG/1
75 CAPSULE ORAL 2 TIMES DAILY
Qty: 10 CAPSULE | Refills: 0 | Status: SHIPPED | OUTPATIENT
Start: 2023-12-26 | End: 2023-12-31

## 2023-12-26 NOTE — PROGRESS NOTES
Subjective:       Patient ID: Evelin Taylor is a 33 y.o. female.    Chief Complaint: Generalized Body Aches, Fever, and Cough    Evelin Taylor is a 33 y.o. female with recurrent cervical lymphadenitis and fibroid who presents today for Generalized Body Aches, Fever, and Cough    Reports yesterday started with body aches, cough, congestion.  With coughing spells may have nausea and episodes of vomiting. Has headaches (mostly behind eyes) and low-grade fever. Earlier today noticed soft stool.    Fiancee with similar symptoms but no better.  Has been trying Tylenol cold and flu with little help. Has not checked for COVID or Flu. With cough spells has caused emesis.    Cough  This is a chronic problem. The current episode started 1 to 4 weeks ago. The problem has been rapidly worsening. The problem occurs every few minutes. The cough is Non-productive. Associated symptoms include chest pain, chills, ear pain, a fever, headaches, myalgias and shortness of breath. Pertinent negatives include no ear congestion, heartburn, hemoptysis, nasal congestion, postnasal drip, rash, rhinorrhea, sore throat, sweats, weight loss or wheezing. The symptoms are aggravated by cold air and lying down. She has tried OTC cough suppressant for the symptoms. The treatment provided mild relief. There is no history of asthma, bronchiectasis, bronchitis, COPD, emphysema, environmental allergies or pneumonia.     [place] No toxic habits.     Health Maintenance:  Health Maintenance   Topic Date Due    Hepatitis C Screening  Never done    TETANUS VACCINE  08/18/2033    Lipid Panel  Completed       Review of Systems   Constitutional:  Positive for chills and fever. Negative for weight loss.   HENT:  Positive for ear pain. Negative for postnasal drip, rhinorrhea and sore throat.    Respiratory:  Positive for cough and shortness of breath. Negative for hemoptysis and wheezing.    Cardiovascular:  Positive for chest  pain.   Gastrointestinal:  Negative for heartburn.   Musculoskeletal:  Positive for myalgias.   Integumentary:  Negative for rash.   Allergic/Immunologic: Negative for environmental allergies.   Neurological:  Positive for headaches.      Past Medical History:   Diagnosis Date    Fibroid 3/17/2023       Past Surgical History:   Procedure Laterality Date    fibroid removal      LYMPH NODE BIOPSY      OVARIAN CYST REMOVAL         Family History   Problem Relation Age of Onset    Asthma Mother     Hypertension Father     Lupus Sister     No Known Problems Brother     Cancer Maternal Grandfather         Lung Cancer    Lupus Paternal Grandmother     Hypertension Paternal Grandfather     Clotting disorder Paternal Grandfather         blood clots in legs    Lupus Other     Kidney disease Other     Hypertension Other     Lupus Other     Breast cancer Neg Hx     Ovarian cancer Neg Hx        Social History     Socioeconomic History    Marital status:    Tobacco Use    Smoking status: Never    Smokeless tobacco: Never   Substance and Sexual Activity    Alcohol use: No     Comment: Occasional    Drug use: No    Sexual activity: Yes     Partners: Female     Birth control/protection: Injection     Social Determinants of Health     Financial Resource Strain: Low Risk  (12/26/2023)    Overall Financial Resource Strain (CARDIA)     Difficulty of Paying Living Expenses: Not hard at all   Food Insecurity: No Food Insecurity (12/26/2023)    Hunger Vital Sign     Worried About Running Out of Food in the Last Year: Never true     Ran Out of Food in the Last Year: Never true   Transportation Needs: No Transportation Needs (12/26/2023)    PRAPARE - Transportation     Lack of Transportation (Medical): No     Lack of Transportation (Non-Medical): No   Physical Activity: Unknown (12/26/2023)    Exercise Vital Sign     Days of Exercise per Week: 0 days   Stress: No Stress Concern Present (12/26/2023)    [facility] of  Occupational Health - Occupational Stress Questionnaire     Feeling of Stress : Not at all   Social Connections: Unknown (12/26/2023)    Social Connection and Isolation Panel [NHANES]     Frequency of Communication with Friends and Family: More than three times a week     Frequency of Social Gatherings with Friends and Family: Once a week     Active Member of Clubs or Organizations: No     Attends Club or Organization Meetings: Never     Marital Status:    Housing Stability: Unknown (12/26/2023)    Housing Stability Vital Sign     Unable to Pay for Housing in the Last Year: No     Unstable Housing in the Last Year: No       Current Outpatient Medications   Medication Sig Dispense Refill    ammonium lactate 12 % Crea apply TOPICALLY to affected area twice daily, rub in thoroughly until rash disappears (Patient not taking: Reported on 12/26/2023) 140 g 0    benzonatate (TESSALON) 200 MG capsule Take 1 capsule (200 mg total) by mouth 3 (three) times daily as needed for Cough. 30 capsule 0    cetirizine (ZYRTEC) 10 MG tablet Take 1 tablet (10 mg total) by mouth once daily. (Patient not taking: Reported on 12/26/2023) 30 tablet 0    diethylpropion (TENUATE) 75 mg SR tablet Take 1 tablet (75 mg total) by mouth once daily. (Patient not taking: Reported on 12/26/2023) 30 tablet 1    erythromycin (ROMYCIN) ophthalmic ointment Place a 1/2 inch ribbon of ointment into the lower eyelid. Apply q.I.d. x5 days (Patient not taking: Reported on 12/26/2023) 3.5 g 0    fluticasone propionate (FLONASE) 50 mcg/actuation nasal spray 2 sprays (100 mcg total) by Each Nostril route once daily. 16 g 2    medroxyPROGESTERone (DEPO-PROVERA) 150 mg/mL Syrg Inject 1 mL (150 mg total) into the muscle every 3 (three) months. for 365 doses (Patient not taking: Reported on 12/26/2023) 1 mL 0    oseltamivir (TAMIFLU) 75 MG capsule Take 1 capsule (75 mg total) by mouth 2 (two) times daily. for 5 days 10 capsule 0     "promethazine-dextromethorphan (PROMETHAZINE-DM) 6.25-15 mg/5 mL Syrp Take 5 mLs by mouth every 6 (six) hours as needed (cough). 120 mL 0    triamcinolone acetonide 0.1% (KENALOG) 0.1 % ointment aaa bid after moisturizing. Not more than 2 weeks straight in same location. Avoid face and groin (Patient not taking: Reported on 12/26/2023) 60 g 1     Current Facility-Administered Medications   Medication Dose Route Frequency Provider Last Rate Last Admin    medroxyPROGESTERone (DEPO-PROVERA) injection 150 mg  150 mg Intramuscular Q90 Days Rashmi Alfonso MD   150 mg at 09/19/22 0905       Review of patient's allergies indicates:   Allergen Reactions    Penicillins Itching     Hives as well. Patient stated she experienced this reaction when she had surgery in 2014.         Objective:       Last 3 sets of Vitals        8/21/2023    12:45 PM 11/28/2023     9:36 AM 12/26/2023     1:40 PM   Vitals - 1 value per visit   SYSTOLIC  126 112   DIASTOLIC  79 74   Pulse  78 96   Temp   99.4 °F (37.4 °C)   SPO2  97 % 99 %   Weight (lb)  236.4 235.67   Weight (kg)  107.23 106.9   Height  5' 3" (1.6 m) 5' 3" (1.6 m)   BMI (Calculated)  41.9 41.8   Pain Score Zero  Eight   Physical Exam  Constitutional:       General: She is not in acute distress.  HENT:      Head: Normocephalic.      Right Ear: Tympanic membrane, ear canal and external ear normal.      Left Ear: Tympanic membrane, ear canal and external ear normal.      Nose: Congestion present.      Mouth/Throat:      Mouth: Mucous membranes are moist.      Pharynx: Posterior oropharyngeal erythema present. No oropharyngeal exudate.   Eyes:      General: No scleral icterus.     Extraocular Movements: Extraocular movements intact.      Conjunctiva/sclera: Conjunctivae normal.   Neck:      Vascular: No carotid bruit.      Comments: No goiter.  Cardiovascular:      Rate and Rhythm: Normal rate and regular rhythm.      Pulses: Normal pulses.      Heart sounds: Normal heart sounds. "   Pulmonary:      Effort: Pulmonary effort is normal.      Breath sounds: Normal breath sounds.   Abdominal:      General: Bowel sounds are normal. There is no distension.      Palpations: Abdomen is soft. There is no mass.      Tenderness: There is no abdominal tenderness.   Musculoskeletal:         General: No swelling.   Lymphadenopathy:      Cervical: No cervical adenopathy.   Skin:     General: Skin is warm and dry.   Neurological:      General: No focal deficit present.      Mental Status: She is alert and oriented to person, place, and time.   Psychiatric:         Mood and Affect: Mood normal.         Behavior: Behavior normal.           CBC:  Recent Labs   Lab 09/13/22  1201 08/17/23  1017   WBC 3.69 L 5.86   RBC 4.33 4.70   Hemoglobin 12.8 13.9   Hematocrit 37.7 40.9   Platelets 215 238   MCV 87 87   MCH 29.6 29.6   MCHC 34.0 34.0     CMP:  Recent Labs   Lab 09/13/22  1201 08/17/23  1017   Glucose 82 84   Calcium 9.6 9.5   Albumin 3.9 4.0   Total Protein 7.8 7.9   Sodium 140 141   Potassium 3.9 4.1   CO2 26 25   Chloride 103 107   BUN 10 13   Creatinine 0.7 0.8   Alkaline Phosphatase 76 82   ALT 12 13   AST 15 13   Total Bilirubin 0.5 0.7     URINALYSIS:  Recent Labs   Lab 08/17/23  1002   Color, UA Yellow   Specific Gravity, UA 1.025   pH, UA 6.0   Protein, UA Trace A   Nitrite, UA Negative   Leukocytes, UA Negative   Urobilinogen, UA Negative      LIPIDS:  Recent Labs   Lab 09/13/22  1201 08/17/23  1017   TSH 0.960 0.483   HDL  --  44   Cholesterol  --  152   Triglycerides  --  41   LDL Cholesterol  --  99.8   HDL/Cholesterol Ratio  --  28.9   Non-HDL Cholesterol  --  108   Total Cholesterol/HDL Ratio  --  3.5     TSH:  Recent Labs   Lab 09/13/22  1201 08/17/23  1017   TSH 0.960 0.483       A1C:  Recent Labs   Lab 08/17/23  1017   Hemoglobin A1C 4.9       Imaging:  X-Ray Foot Complete 3 view Bilateral  Narrative: EXAM: XR FOOT COMPLETE 3 VIEW BILATERAL    CLINICAL HISTORY: [Pain]    FINDINGS: 3 views  bilateral feet.  No comparison.    Small bilateral plantar calcaneal spurs.  No evidence of acute fracture or dislocation.  Normal mineralization.  Soft tissues are unremarkable.  Impression:  No acute findings.    Finalized on: 3/28/2023 3:49 PM By:  Bar Sepulveda MD  [facility]# [id number]      2023-03-28 15:51:32.861    [facility]  X-Ray Ankle Complete 3 View Bilateral  Narrative: EXAM: XR ANKLE COMPLETE 3 VIEW BILATERAL    CLINICAL HISTORY: Bilateral ankle pain    COMPARISON:  None available    Bilateral ankles 3 views    FINDINGS:    The joint spaces are preserved.  Normal alignment.  Calcaneal spurs bilaterally.  Negative for fracture.  No articular erosions.  Impression: No acute findings.    Finalized on: 3/28/2023 3:30 PM By:  SLOAN Ball MD, MD  [facility]# [id number]      2023-03-28 15:32:11.604    [facility]      Assessment:       1. Flu            Plan:       1. Flu  -     oseltamivir (TAMIFLU) 75 MG capsule; Take 1 capsule (75 mg total) by mouth 2 (two) times daily. for 5 days  Dispense: 10 capsule; Refill: 0  -     benzonatate (TESSALON) 200 MG capsule; Take 1 capsule (200 mg total) by mouth 3 (three) times daily as needed for Cough.  Dispense: 30 capsule; Refill: 0  -     promethazine-dextromethorphan (PROMETHAZINE-DM) 6.25-15 mg/5 mL Syrp; Take 5 mLs by mouth every 6 (six) hours as needed (cough).  Dispense: 120 mL; Refill: 0  -     POCT Influenza A/B Rapid Antigen  -     POCT COVID-19 Rapid Screening  -     Ok to continue Tylenol cold and flu medication.  -     fluticasone propionate (FLONASE) 50 mcg/actuation nasal spray; 2 sprays (100 mcg total) by Each Nostril route once daily.  Dispense: 16 g; Refill: 2       Health Maintenance Due   Topic Date Due    Hepatitis C Screening  Never done    HIV Screening  Never done    Influenza Vaccine (1) Never done    COVID-19 Vaccine (3 - 2023-24 season) 09/01/2023            Return to clinic as needed.    Chanel Hernandez MD  [facility]  Disclaimer:  This note has been generated  using voice-recognition software. There may be grammatical or spelling errors that have been missed during proof-reading

## 2023-12-26 NOTE — LETTER
December 26, 2023    Evelin Holder   [address] [address]         [facility]  [address]  [facility] [address]  Phone: [phone]  Fax: [phone] December 26, 2023     Patient: Evelin Holder   YOB: 1990   Date of Visit: 12/26/2023       To Whom It May Concern:    It is my medical opinion that Evelin Holder may return to work on 12/30/2023 .    If you have any questions or concerns, please don't hesitate to call.    Sincerely,        Chanel Hernandez MD

## 2024-03-06 ENCOUNTER — PATIENT MESSAGE (OUTPATIENT)
Dept: ADMINISTRATIVE | Facility: HOSPITAL | Age: 34
End: 2024-03-06
Payer: COMMERCIAL

## 2024-03-06 ENCOUNTER — PATIENT OUTREACH (OUTPATIENT)
Dept: ADMINISTRATIVE | Facility: HOSPITAL | Age: 34
End: 2024-03-06
Payer: COMMERCIAL

## 2025-03-26 NOTE — PROGRESS NOTES
Cardiology Clinic Visit    History of Present Illness:       Evelin Taylor is a pleasant 34 y.o. female with PMH noted below in assessment/plan presents to establish care. She has significant family history as below. She voiced no CV complaints. Denies cp, sob, palpitations, presyncope/dizziness, syncope, orthopnea, PND, bendopnea, decrease ET, NVDC, fever, chills.    Sister 34 is currently hospitalized at [facility] with Heart failure.  Mom  40 yrs  Sister  at 33 (this month)  Aunt-  of heart attack at 38 yo   Grandmother  at 41 yo     History obtained by patient interview and supplemented by nursing documentation and chart review.   PMHx:  has a past medical history of Fibroid (3/17/2023).   SurgHx:  has a past surgical history that includes Lymph node biopsy; fibroid removal; and Ovarian cyst removal.   FamHx: family history includes Asthma in her mother; Cancer in her maternal grandfather; Clotting disorder in her paternal grandfather; Hypertension in her father, paternal grandfather, and another family member; Kidney disease in an other family member; Lupus in her paternal grandmother, sister, and other family members; No Known Problems in her brother.   SocialHx:  reports that she has never smoked. She has never used smokeless tobacco. She reports that she does not drink alcohol and does not use drugs.  Home Meds:  Current Outpatient Medications   Medication Instructions    ammonium lactate 12 % Crea apply TOPICALLY to affected area twice daily, rub in thoroughly until rash disappears    cetirizine (ZYRTEC) 10 mg, Oral, Daily    diethylpropion (TENUATE) 75 mg, Oral, Daily    erythromycin (ROMYCIN) ophthalmic ointment Place a 1/2 inch ribbon of ointment into the lower eyelid. Apply q.I.d. x5 days    fluticasone propionate (FLONASE) 100 mcg, Each Nostril, Daily    medroxyPROGESTERone (DEPO-PROVERA) 150 mg, Intramuscular, Every 3 months    triamcinolone acetonide 0.1% (KENALOG) 0.1 %  "ointment aaa bid after moisturizing. Not more than 2 weeks straight in same location. Avoid face and groin       Review of Systems: Comprehensive ROS was performed and is negative unless otherwise noted in HPI.   Objective   Objective/Exam:   BP (!) 136/90 (BP Location: Right arm, Patient Position: Sitting)   Pulse 81   Ht 5' 3" (1.6 m)   Wt 108 kg (238 lb)   BMI 42.16 kg/m²    Wt Readings from Last 4 Encounters:   03/27/25 108 kg (238 lb)   12/26/23 106.9 kg (235 lb 10.8 oz)   11/28/23 107.2 kg (236 lb 6.4 oz)   08/18/23 107 kg (236 lb)      Constitutional: NAD, Atraumatic, Conversant   HEENT: MMM, Sclera anicteric, No JVD   Cardiovascular: RRR, no murmurs noted, no chest tenderness to palpation, 2+ radial pulses b/l  Pulmonary: normal respiratory effort, CTAB, no crackles, wheezes  Abdominal: S/NT/ND  Musculoskeletal: No lower extremity edema noted b/l  Neurological: No gross neurological deficits  Skin: W/D/I  Psych: Appropriate affect, normal mood  Labs/Imaging/Procedures   Personally reviewed  Lab Results   Component Value Date     12/29/2023     08/17/2023    K 4.4 12/29/2023    K 4.1 08/17/2023     08/17/2023    CO2 33 (H) 12/29/2023    CO2 25 08/17/2023    BUN 13.0 12/29/2023    BUN 13 08/17/2023    CREATININE 0.60 12/29/2023    CREATININE 0.8 08/17/2023    GLUCOSE 69 12/29/2023    ANIONGAP 4 (L) 12/29/2023    ANIONGAP 9 08/17/2023     Lab Results   Component Value Date    HGBA1C 4.9 08/17/2023     No results found for: "BNP", "BNPTRIAGEBLO"   Lab Results   Component Value Date    WBC 3.2 (L) 12/29/2023    WBC 5.86 08/17/2023    HGB 13.5 12/29/2023    HGB 13.9 08/17/2023    HCT 41.0 12/29/2023    HCT 40.9 08/17/2023     08/17/2023    GRAN 4.0 08/17/2023    GRAN 68.1 08/17/2023     Lab Results   Component Value Date    CHOL 152 08/17/2023    HDL 44 08/17/2023    LDLCALC 99.8 08/17/2023    TRIG 41 08/17/2023     Lab Results   Component Value Date    TSH 0.483 08/17/2023     No " "results found for: "APOLIPOPROTE"  No results found for: "LIPOA"     TTE:  No results found for this or any previous visit.    Stress Testing:   No results found for this or any previous visit.     Coronary Angiogram:  No results found for this or any previous visit.    -Reviewing Medical records & lab results  -Independently reviewing and interpreting (if not documented by myself) EKGs, echocardiograms, catherizations   -Obtaining a history, performing a relevant exam, counseling/educating the patient/family  -Documenting clinical information in the EMR including ordering of tests  -Care coordination and communicating with other health care providers       Problem List:     1. Family history of heart disease    2. Abnormal electrocardiogram (ECG) (EKG)      Assessment/Plan:   Evelin Taylor is a 34 y.o. female with a PMHx of significant family hx of CV dz here establish care.     Plan:  Echo  cCTA   Holter  Referral to -requested [facility]        Patient expressed verbal understanding and agreed with the plan     Return sooner for concerns or questions. If symptoms persist go to the ED.  I have reviewed all pertinent data including patient's medical history in detail and updated the computerized patient record.     Total time spent counseling greater than fifty percent of total visit time.  Counseling included discussion regarding imaging findings, diagnosis, possibilities, treatment options, risks and benefits.      Thank you for the opportunity to care for this patient. Please don't hesitate to reach out with any questions/concerns         Zach Granados MD  Cardiovascular Disease; Interventional Cardiology  [facility]    "

## 2025-03-27 ENCOUNTER — OFFICE VISIT (OUTPATIENT)
Dept: CARDIOLOGY | Facility: CLINIC | Age: 35
End: 2025-03-27
Payer: COMMERCIAL

## 2025-03-27 VITALS
BODY MASS INDEX: 42.17 KG/M2 | HEIGHT: 63 IN | SYSTOLIC BLOOD PRESSURE: 136 MMHG | WEIGHT: 238 LBS | HEART RATE: 81 BPM | DIASTOLIC BLOOD PRESSURE: 90 MMHG

## 2025-03-27 DIAGNOSIS — R94.31 ABNORMAL ELECTROCARDIOGRAM (ECG) (EKG): ICD-10-CM

## 2025-03-27 DIAGNOSIS — Z82.49 FAMILY HISTORY OF HEART DISEASE: Primary | ICD-10-CM

## 2025-03-27 PROCEDURE — 99999 PR PBB SHADOW E&M-EST. PATIENT-LVL III: CPT | Mod: PBBFAC,,, | Performed by: STUDENT IN AN ORGANIZED HEALTH CARE EDUCATION/TRAINING PROGRAM

## 2025-04-02 ENCOUNTER — RESULTS FOLLOW-UP (OUTPATIENT)
Dept: CARDIOLOGY | Facility: CLINIC | Age: 35
End: 2025-04-02

## 2025-04-02 ENCOUNTER — LAB VISIT (OUTPATIENT)
Dept: LAB | Facility: HOSPITAL | Age: 35
End: 2025-04-02
Payer: COMMERCIAL

## 2025-04-02 DIAGNOSIS — Z82.49 FAMILY HISTORY OF HEART DISEASE: ICD-10-CM

## 2025-04-02 LAB
ANION GAP (OHS): 6 MMOL/L (ref 8–16)
BUN SERPL-MCNC: 13 MG/DL (ref 6–20)
CALCIUM SERPL-MCNC: 8.9 MG/DL (ref 8.7–10.5)
CHLORIDE SERPL-SCNC: 102 MMOL/L (ref 95–110)
CO2 SERPL-SCNC: 28 MMOL/L (ref 23–29)
CREAT SERPL-MCNC: 0.7 MG/DL (ref 0.5–1.4)
GFR SERPLBLD CREATININE-BSD FMLA CKD-EPI: >60 ML/MIN/1.73/M2
GLUCOSE SERPL-MCNC: 83 MG/DL (ref 70–110)
POTASSIUM SERPL-SCNC: 4.1 MMOL/L (ref 3.5–5.1)
SODIUM SERPL-SCNC: 136 MMOL/L (ref 136–145)

## 2025-04-02 PROCEDURE — 36415 COLL VENOUS BLD VENIPUNCTURE: CPT

## 2025-04-02 PROCEDURE — 80048 BASIC METABOLIC PNL TOTAL CA: CPT

## 2025-04-03 ENCOUNTER — OFFICE VISIT (OUTPATIENT)
Dept: FAMILY MEDICINE | Facility: CLINIC | Age: 35
End: 2025-04-03
Payer: COMMERCIAL

## 2025-04-03 VITALS
HEIGHT: 63 IN | WEIGHT: 239.19 LBS | DIASTOLIC BLOOD PRESSURE: 74 MMHG | SYSTOLIC BLOOD PRESSURE: 110 MMHG | HEART RATE: 98 BPM | TEMPERATURE: 98 F | BODY MASS INDEX: 42.38 KG/M2 | OXYGEN SATURATION: 95 %

## 2025-04-03 DIAGNOSIS — E66.813 CLASS 3 SEVERE OBESITY DUE TO EXCESS CALORIES WITH SERIOUS COMORBIDITY AND BODY MASS INDEX (BMI) OF 40.0 TO 44.9 IN ADULT: ICD-10-CM

## 2025-04-03 DIAGNOSIS — E66.01 CLASS 3 SEVERE OBESITY DUE TO EXCESS CALORIES WITH SERIOUS COMORBIDITY AND BODY MASS INDEX (BMI) OF 40.0 TO 44.9 IN ADULT: ICD-10-CM

## 2025-04-03 DIAGNOSIS — Z82.49 FAMILY HISTORY OF HEART DISEASE: ICD-10-CM

## 2025-04-03 DIAGNOSIS — R94.31 ABNORMAL ELECTROCARDIOGRAM (ECG) (EKG): ICD-10-CM

## 2025-04-03 DIAGNOSIS — Z76.89 ENCOUNTER TO ESTABLISH CARE WITH NEW DOCTOR: ICD-10-CM

## 2025-04-03 DIAGNOSIS — Z11.4 SCREENING FOR HIV (HUMAN IMMUNODEFICIENCY VIRUS): ICD-10-CM

## 2025-04-03 DIAGNOSIS — Z00.00 ANNUAL PHYSICAL EXAM: Primary | ICD-10-CM

## 2025-04-03 DIAGNOSIS — Z11.59 ENCOUNTER FOR HEPATITIS C SCREENING TEST FOR LOW RISK PATIENT: ICD-10-CM

## 2025-04-03 DIAGNOSIS — E55.9 VITAMIN D DEFICIENCY: ICD-10-CM

## 2025-04-03 PROCEDURE — 1159F MED LIST DOCD IN RCRD: CPT | Mod: CPTII,S$GLB,, | Performed by: FAMILY MEDICINE

## 2025-04-03 PROCEDURE — 3074F SYST BP LT 130 MM HG: CPT | Mod: CPTII,S$GLB,, | Performed by: FAMILY MEDICINE

## 2025-04-03 PROCEDURE — 3008F BODY MASS INDEX DOCD: CPT | Mod: CPTII,S$GLB,, | Performed by: FAMILY MEDICINE

## 2025-04-03 PROCEDURE — 99999 PR PBB SHADOW E&M-EST. PATIENT-LVL IV: CPT | Mod: PBBFAC,,, | Performed by: FAMILY MEDICINE

## 2025-04-03 PROCEDURE — 3078F DIAST BP <80 MM HG: CPT | Mod: CPTII,S$GLB,, | Performed by: FAMILY MEDICINE

## 2025-04-03 PROCEDURE — 99395 PREV VISIT EST AGE 18-39: CPT | Mod: S$GLB,,, | Performed by: FAMILY MEDICINE

## 2025-04-03 NOTE — PROGRESS NOTES
(Portions of this note were dictated using voice recognition software and may contain dictation related errors in spelling/grammar/syntax not found on text review)    CC:   Chief Complaint   Patient presents with    [facility]    Annual Exam       HPI: 34 y.o. female presented to [facility] as new pt for routine annual check up and labs. She has medical history significant for fibroid uterus, family history of sudden cardiac deaths, obesity.    She recently saw cardiology after her sister passed away last months suddenly, autopsy revealed enlarged heart. Her mom passed away at age 40 also. Her other sister has lupus. She had EKG done and have Holter and Echo scheduled.    She reports anxiety after her sisters demise, states it is slightly better, had few episodes of chest pain and panic attacks after that.    She is due for annual labs.    She does not smoke, has no toxic habits    No other symptoms or concerns.    Past Medical History:   Diagnosis Date    Fibroid 3/17/2023       Past Surgical History:   Procedure Laterality Date    fibroid removal      LYMPH NODE BIOPSY      OVARIAN CYST REMOVAL         Family History   Problem Relation Name Age of Onset    Asthma Mother Mary     Hypertension Father      Lupus Sister 3     No Known Problems Brother 1     Cancer Maternal Grandfather Roe         Lung Cancer    Lupus Paternal Grandmother      Hypertension Paternal Grandfather      Clotting disorder Paternal Grandfather          blood clots in legs    Lupus Other Niece     Kidney disease Other Niece     Hypertension Other Nephew     Lupus Other Nephew     Breast cancer Neg Hx      Ovarian cancer Neg Hx         Social History[1]    Lab Results   Component Value Date    WBC 3.2 (L) 12/29/2023    HGB 13.5 12/29/2023    HCT 41.0 12/29/2023    MCV 87.1 12/29/2023     08/17/2023    CHOL 152 08/17/2023    TRIG 41 08/17/2023    HDL 44 08/17/2023    ALT 13 08/17/2023    AST 13 08/17/2023    BILITOT 0.7  08/17/2023    ALKPHOS 82 08/17/2023     04/02/2025    K 4.1 04/02/2025     04/02/2025    CREATININE 0.7 04/02/2025    ESTGFRAFRICA >60 05/24/2018    EGFRNONAA >60 05/24/2018    CALCIUM 8.9 04/02/2025    ALBUMIN 4.0 08/17/2023    BUN 13 04/02/2025    CO2 28 04/02/2025    TSH 0.483 08/17/2023    HGBA1C 4.9 08/17/2023    LDLCALC 99.8 08/17/2023    GLU 84 08/17/2023    [id number] 21 (L) 08/17/2023             Vital signs reviewed  PE:   APPEARANCE: Well nourished, well developed, in no acute distress.    HEAD: Normocephalic, atraumatic.  EYES: EOMI.  Conjunctivae noninjected.  NOSE: Mucosa pink. Airway clear.  NECK: Supple with no cervical lymphadenopathy.    CHEST: Good inspiratory effort. Lungs clear to auscultation with no wheezes or crackles.  CARDIOVASCULAR: Normal S1, S2. No rubs, murmurs, or gallops.  ABDOMEN: Bowel sounds normal. Not distended. Soft. No tenderness or masses. No organomegaly.  EXTREMITIES: No edema, cyanosis, or clubbing.    Review of Systems   Constitutional:  Negative for chills, fatigue and fever.   HENT: Negative.     Respiratory:  Negative for cough, shortness of breath and wheezing.    Cardiovascular:  Negative for chest pain, palpitations and leg swelling.   Gastrointestinal: Negative.    Genitourinary: Negative.    Neurological: Negative.    Psychiatric/Behavioral: Negative.     All other systems reviewed and are negative.      IMPRESSION  1. Annual physical exam    2. Family history of heart disease    3. Abnormal electrocardiogram (ECG) (EKG)    4. Vitamin D deficiency    5. Encounter for hepatitis C screening test for low risk patient    6. Screening for HIV (human immunodeficiency virus)    7. Encounter to establish care with new doctor    8. Class 3 severe obesity due to excess calories with serious comorbidity and body mass index (BMI) of 40.0 to 44.9 in adult            PLAN      1. Family history of heart disease    2. Abnormal electrocardiogram (ECG)  (EKG)    Followed by cardiology    Have echo and Holter monitor scheduled      3. Annual physical exam (Primary)    - CBC Auto Differential; Future  - Iron and TIBC; Future  - Vitamin B12; Future  - Lipid Panel; Future  - Hemoglobin A1C; Future  - TSH; Future  - T4, Free; Future  - VALERIE; Future  - VALERIE      4. Vitamin D deficiency    - Vitamin D; Future      5. Encounter for hepatitis C screening test for low risk patient    - Hepatitis C Antibody; Future      6. Screening for HIV (human immunodeficiency virus)    - HIV 1/2 Ag/Ab (4th Gen); Future      7. Encounter to establish care with new doctor        8. Class 3 severe obesity due to excess calories with serious comorbidity and body mass index (BMI) of 40.0 to 44.9 in adult    Counseling provided on healthy lifestyle, encouraged to do moderate intensity regular exercise 30 minutes every day 5 days a week, to include vegetables and fruits and cut back on saturated fats and carbohydrates.          SCREENINGS      Immunizations:     UTD with Tdap    UTD with Covid vaccines      Age/demographic appropriate health maintenance:    UTD with pap smear      Health Maintenance Due   Topic Date Due    Hepatitis C Screening  Never done    HIV Screening  Never done           Spent adequate time in obtaining history and explaining differentials     35 minutes spent during this visit of which greater than 50% devoted to face-face counseling and coordination of care regarding diagnosis and management plan       Valery Art   4/3/2025       [1]   Social History  Tobacco Use    Smoking status: Never    Smokeless tobacco: Never   Substance Use Topics    Alcohol use: No     Comment: Occasional    Drug use: No

## 2025-04-05 ENCOUNTER — LAB VISIT (OUTPATIENT)
Dept: LAB | Facility: HOSPITAL | Age: 35
End: 2025-04-05
Attending: FAMILY MEDICINE
Payer: COMMERCIAL

## 2025-04-05 DIAGNOSIS — Z11.59 ENCOUNTER FOR HEPATITIS C SCREENING TEST FOR LOW RISK PATIENT: ICD-10-CM

## 2025-04-05 DIAGNOSIS — Z00.00 ANNUAL PHYSICAL EXAM: ICD-10-CM

## 2025-04-05 DIAGNOSIS — Z11.4 SCREENING FOR HIV (HUMAN IMMUNODEFICIENCY VIRUS): ICD-10-CM

## 2025-04-05 LAB
ABSOLUTE EOSINOPHIL (OHS): 0.05 K/UL
ABSOLUTE MONOCYTE (OHS): 0.37 K/UL (ref 0.3–1)
ABSOLUTE NEUTROPHIL COUNT (OHS): 2.94 K/UL (ref 1.8–7.7)
BASOPHILS # BLD AUTO: 0.03 K/UL
BASOPHILS NFR BLD AUTO: 0.7 %
CHOLEST SERPL-MCNC: 148 MG/DL (ref 120–199)
CHOLEST/HDLC SERPL: 3.1 {RATIO} (ref 2–5)
EAG (OHS): 100 MG/DL (ref 68–131)
ERYTHROCYTE [DISTWIDTH] IN BLOOD BY AUTOMATED COUNT: 11.9 % (ref 11.5–14.5)
HBA1C MFR BLD: 5.1 % (ref 4–5.6)
HCT VFR BLD AUTO: 39.4 % (ref 37–48.5)
HCV AB SERPL QL IA: NORMAL
HDLC SERPL-MCNC: 47 MG/DL (ref 40–75)
HDLC SERPL: 31.8 % (ref 20–50)
HGB BLD-MCNC: 13 GM/DL (ref 12–16)
HIV 1+2 AB+HIV1 P24 AG SERPL QL IA: NORMAL
IMM GRANULOCYTES # BLD AUTO: 0.01 K/UL (ref 0–0.04)
IMM GRANULOCYTES NFR BLD AUTO: 0.2 % (ref 0–0.5)
IRON SATN MFR SERPL: 31 % (ref 20–50)
IRON SERPL-MCNC: 132 UG/DL (ref 30–160)
LDLC SERPL CALC-MCNC: 91.8 MG/DL (ref 63–159)
LYMPHOCYTES # BLD AUTO: 1.12 K/UL (ref 1–4.8)
MCH RBC QN AUTO: 28.7 PG (ref 27–31)
MCHC RBC AUTO-ENTMCNC: 33 G/DL (ref 32–36)
MCV RBC AUTO: 87 FL (ref 82–98)
NONHDLC SERPL-MCNC: 101 MG/DL
NUCLEATED RBC (/100WBC) (OHS): 0 /100 WBC
PLATELET # BLD AUTO: 245 K/UL (ref 150–450)
PMV BLD AUTO: 9.9 FL (ref 9.2–12.9)
RBC # BLD AUTO: 4.53 M/UL (ref 4–5.4)
RELATIVE EOSINOPHIL (OHS): 1.1 %
RELATIVE LYMPHOCYTE (OHS): 24.8 % (ref 18–48)
RELATIVE MONOCYTE (OHS): 8.2 % (ref 4–15)
RELATIVE NEUTROPHIL (OHS): 65 % (ref 38–73)
T4 FREE SERPL-MCNC: 0.99 NG/DL (ref 0.71–1.51)
T4 FREE SERPL-MCNC: 0.99 NG/DL (ref 0.71–1.51)
TIBC SERPL-MCNC: 432 UG/DL (ref 250–450)
TRANSFERRIN SERPL-MCNC: 292 MG/DL (ref 200–375)
TRIGL SERPL-MCNC: 46 MG/DL (ref 30–150)
TSH SERPL-ACNC: 0.7 UIU/ML (ref 0.4–4)
VIT B12 SERPL-MCNC: 380 PG/ML (ref 210–950)
WBC # BLD AUTO: 4.52 K/UL (ref 3.9–12.7)

## 2025-04-05 PROCEDURE — 83540 ASSAY OF IRON: CPT

## 2025-04-05 PROCEDURE — 82607 VITAMIN B-12: CPT

## 2025-04-05 PROCEDURE — 87389 HIV-1 AG W/HIV-1&-2 AB AG IA: CPT

## 2025-04-05 PROCEDURE — 85025 COMPLETE CBC W/AUTO DIFF WBC: CPT

## 2025-04-05 PROCEDURE — 36415 COLL VENOUS BLD VENIPUNCTURE: CPT

## 2025-04-05 PROCEDURE — 83036 HEMOGLOBIN GLYCOSYLATED A1C: CPT

## 2025-04-05 PROCEDURE — 84443 ASSAY THYROID STIM HORMONE: CPT

## 2025-04-05 PROCEDURE — 86803 HEPATITIS C AB TEST: CPT

## 2025-04-05 PROCEDURE — 80061 LIPID PANEL: CPT

## 2025-04-07 ENCOUNTER — RESULTS FOLLOW-UP (OUTPATIENT)
Dept: RHEUMATOLOGY | Facility: CLINIC | Age: 35
End: 2025-04-07

## 2025-04-08 ENCOUNTER — PATIENT MESSAGE (OUTPATIENT)
Dept: CARDIOLOGY | Facility: HOSPITAL | Age: 35
End: 2025-04-08
Payer: COMMERCIAL

## 2025-04-08 ENCOUNTER — TELEPHONE (OUTPATIENT)
Dept: CARDIOLOGY | Facility: HOSPITAL | Age: 35
End: 2025-04-08
Payer: COMMERCIAL

## 2025-04-08 RX ORDER — METOPROLOL TARTRATE 50 MG/1
50 TABLET ORAL
Qty: 1 TABLET | Refills: 0 | Status: SHIPPED | OUTPATIENT
Start: 2025-04-08

## 2025-04-08 RX ORDER — METOPROLOL TARTRATE 50 MG/1
50 TABLET ORAL
COMMUNITY
End: 2025-04-08 | Stop reason: SDUPTHER

## 2025-04-08 RX ORDER — IVABRADINE 7.5 MG/1
22.5 TABLET, FILM COATED ORAL
COMMUNITY
End: 2025-04-08 | Stop reason: SDUPTHER

## 2025-04-08 RX ORDER — IVABRADINE 7.5 MG/1
22.5 TABLET, FILM COATED ORAL
Qty: 3 TABLET | Refills: 0 | Status: SHIPPED | OUTPATIENT
Start: 2025-04-08

## 2025-04-08 NOTE — TELEPHONE ENCOUNTER
I had the pleasure of discussing your upcoming Cardiac CTA scheduled for 04/10/2025 at 3:00. To review, we discussed showing up 15-20 minutes before your appointment time. Your test is located at [facility] on the corner of [address], address [address], [place] [address], next door to [employer].     I have reviewed your current medications that you take and I've discussed the Cardiac CTA prep for heart rate management with Dr. Lowry, the interpreting MD. He is ordering for you to take 22.5mg of Ivabradine (Corlanor) and 50mg of Metoprolol tartrate (Lopressor) both 2-hours prior to the CTA. Again, the goal for this is to maintain a desired heart rate of ~60 beats/minute for the duration of the test--about 40 minutes. This helps for the overall study quality and clarity. These were sent to the [facility].    Reminder for a 4-hour fasting time, no caffeine the AM of, and you can have water, anywhere from 16-32 ounces before and after completion of the test. It is advisable to have someone transport you to and from the test as a safety precaution. Thank you again for your time today. For any questions or concerns: I am available M-F from 7:30-4, please call [phone].

## 2025-04-10 ENCOUNTER — HOSPITAL ENCOUNTER (OUTPATIENT)
Dept: RADIOLOGY | Facility: HOSPITAL | Age: 35
Discharge: HOME | End: 2025-04-10
Attending: STUDENT IN AN ORGANIZED HEALTH CARE EDUCATION/TRAINING PROGRAM
Payer: COMMERCIAL

## 2025-04-10 VITALS
RESPIRATION RATE: 16 BRPM | SYSTOLIC BLOOD PRESSURE: 132 MMHG | DIASTOLIC BLOOD PRESSURE: 78 MMHG | OXYGEN SATURATION: 99 % | HEART RATE: 67 BPM

## 2025-04-10 DIAGNOSIS — R94.31 ABNORMAL ELECTROCARDIOGRAM (ECG) (EKG): ICD-10-CM

## 2025-04-10 PROCEDURE — 25500020 PHARM REV CODE 255: Performed by: STUDENT IN AN ORGANIZED HEALTH CARE EDUCATION/TRAINING PROGRAM

## 2025-04-10 PROCEDURE — 75574 CT ANGIO HRT W/3D IMAGE: CPT | Mod: 26,,, | Performed by: STUDENT IN AN ORGANIZED HEALTH CARE EDUCATION/TRAINING PROGRAM

## 2025-04-10 PROCEDURE — 25000003 PHARM REV CODE 250: Performed by: STUDENT IN AN ORGANIZED HEALTH CARE EDUCATION/TRAINING PROGRAM

## 2025-04-10 PROCEDURE — 75574 CT ANGIO HRT W/3D IMAGE: CPT | Mod: TC

## 2025-04-10 PROCEDURE — 25000242 PHARM REV CODE 250 ALT 637 W/ HCPCS: Performed by: STUDENT IN AN ORGANIZED HEALTH CARE EDUCATION/TRAINING PROGRAM

## 2025-04-10 RX ORDER — NITROGLYCERIN 0.4 MG/1
0.4 TABLET SUBLINGUAL ONCE
Status: COMPLETED | OUTPATIENT
Start: 2025-04-10 | End: 2025-04-10

## 2025-04-10 RX ORDER — METOPROLOL TARTRATE 1 MG/ML
5 INJECTION, SOLUTION INTRAVENOUS EVERY 5 MIN PRN
Status: DISCONTINUED | OUTPATIENT
Start: 2025-04-10 | End: 2025-04-11 | Stop reason: HOSPADM

## 2025-04-10 RX ADMIN — NITROGLYCERIN 0.4 MG: 0.4 TABLET SUBLINGUAL at 03:04

## 2025-04-10 RX ADMIN — METOPROLOL TARTRATE 5 MG: 5 INJECTION, SOLUTION INTRAVENOUS at 03:04

## 2025-04-10 RX ADMIN — IOHEXOL 100 ML: 350 INJECTION, SOLUTION INTRAVENOUS at 03:04

## 2025-04-10 NOTE — NURSING
Pt arrived to [facility] for cardiac CTA.  Pt AAOx4, ambulatory, no distress noted. Pt informed of procedure, need for PIV and side effects of contrast and nitroglycerin.  Pt connected for continuous vital sign monitoring. VS assessed and put in flowsheets.  IV in place.  Pt verbalizes understanding.

## 2025-04-10 NOTE — NURSING
Pt VSS post CTA study. Pt exhibiting no adverse effects from medication. Pt able to adequately ambulate with no dizziness or SOB. IV removed, site dressed with guaze and coban. [facility]. Pt educated on post study instructions. Verbalized understanding.

## 2025-07-24 ENCOUNTER — TELEPHONE (OUTPATIENT)
Dept: FAMILY MEDICINE | Facility: CLINIC | Age: 35
End: 2025-07-24
Payer: COMMERCIAL

## 2025-07-24 NOTE — TELEPHONE ENCOUNTER
Copied from CRM #[id number]. Topic: General Inquiry - Patient Update  >> Jul 24, 2025  1:04 PM Alice wrote:  Type:  Needs Medical Advice    Who Called: Patient    Best Call Back Number: [phone]    Additional Information: Patient is requesting a call back in regards to her appt scheduled today at 2:20

## 2025-07-29 ENCOUNTER — OFFICE VISIT (OUTPATIENT)
Dept: FAMILY MEDICINE | Facility: CLINIC | Age: 35
End: 2025-07-29
Payer: COMMERCIAL

## 2025-07-29 VITALS
SYSTOLIC BLOOD PRESSURE: 122 MMHG | BODY MASS INDEX: 43.98 KG/M2 | OXYGEN SATURATION: 99 % | DIASTOLIC BLOOD PRESSURE: 78 MMHG | TEMPERATURE: 98 F | WEIGHT: 248.25 LBS | HEART RATE: 76 BPM | HEIGHT: 63 IN

## 2025-07-29 DIAGNOSIS — Z02.0 SCHOOL PHYSICAL EXAM: Primary | ICD-10-CM

## 2025-07-29 PROCEDURE — 99999 PR PBB SHADOW E&M-EST. PATIENT-LVL III: CPT | Mod: PBBFAC,,,

## 2025-07-29 NOTE — PROGRESS NOTES
Subjective     Patient ID: Evelin Taylor is a 34 y.o. female.    Chief Complaint: Employment Physical (School physical)      Patient is a pleasant 34 year old black female with no significant medical history that presents to clinic with her children today for a school physical exam. Denies any acute complaints or concerns. Has provided lab results for titers and immunity status from [facility] done 2/2025. Unsure if school requires Tb skin test or if blood work is sufficient.   Doing well overall. Denies ETOH, smoking, or recreational drug use. 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       Review of Systems   All other systems reviewed and are negative.    Past Medical History:   Diagnosis Date    Fibroid 3/17/2023       Past Surgical History:   Procedure Laterality Date    fibroid removal      LYMPH NODE BIOPSY      OVARIAN CYST REMOVAL         Family History   Problem Relation Name Age of Onset    Asthma Mother Mary     Hypertension Father      Lupus Sister 3     No Known Problems Brother 1     Cancer Maternal Grandfather Roe         Lung Cancer    Lupus Paternal Grandmother      Hypertension Paternal Grandfather      Clotting disorder Paternal Grandfather          blood clots in legs    Lupus Other Niece     Kidney disease Other Niece     Hypertension Other Nephew     Lupus Other Nephew     Breast cancer Neg Hx      Ovarian cancer Neg Hx         Social History     Socioeconomic History    Marital status:    Tobacco Use    Smoking status: Never    Smokeless tobacco: Never   Substance and Sexual Activity    Alcohol use: No     Comment: Occasional    Drug use: No    Sexual activity: Yes     Partners: Female     Birth control/protection: Injection     Social Drivers of Health     Financial Resource Strain: Low Risk  (3/30/2025)    Overall Financial Resource Strain (CARDIA)     Difficulty of Paying Living Expenses: Not very hard   Food Insecurity: No Food Insecurity (3/30/2025)    Hunger  "Vital Sign     Worried About Running Out of Food in the Last Year: Never true     Ran Out of Food in the Last Year: Never true   Transportation Needs: No Transportation Needs (3/30/2025)    PRAPARE - Transportation     Lack of Transportation (Medical): No     Lack of Transportation (Non-Medical): No   Physical Activity: Inactive (3/30/2025)    Exercise Vital Sign     Days of Exercise per Week: 0 days     Minutes of Exercise per Session: 0 min   Stress: No Stress Concern Present (3/30/2025)    [employer] - Occupational Stress Questionnaire     Feeling of Stress : Only a little   Housing Stability: High Risk (3/30/2025)    Housing Stability Vital Sign     Unable to Pay for Housing in the Last Year: Yes     Number of Times Moved in the Last Year: 0     Homeless in the Last Year: No       Current Medications[1]    Review of patient's allergies indicates:   Allergen Reactions    Penicillins Itching     Hives as well. Patient stated she experienced this reaction when she had surgery in 2014.         Objective     Vitals:    07/29/25 1555   BP: 122/78   Pulse: 76   Temp: 98.2 °F (36.8 °C)   TempSrc: Oral   SpO2: 99%   Weight: 112.6 kg (248 lb 3.8 oz)   Height: 5' 3" (1.6 m)   PainSc: 0-No pain      Physical Exam  Vitals and nursing note reviewed.   Constitutional:       General: She is not in acute distress.     Appearance: Normal appearance. She is obese. She is not ill-appearing.   HENT:      Head: Normocephalic and atraumatic.      Right Ear: Tympanic membrane normal.      Left Ear: Tympanic membrane normal.      Nose: Nose normal.      Mouth/Throat:      Mouth: Mucous membranes are moist.      Pharynx: Oropharynx is clear.   Eyes:      General: No scleral icterus.        Right eye: No discharge.         Left eye: No discharge.      Extraocular Movements: Extraocular movements intact.      Conjunctiva/sclera: Conjunctivae normal.   Cardiovascular:      Rate and Rhythm: Normal rate and " regular rhythm.      Pulses: Normal pulses.      Heart sounds: Normal heart sounds. No murmur heard.  Pulmonary:      Effort: Pulmonary effort is normal. No respiratory distress.   Abdominal:      General: Abdomen is flat. Bowel sounds are normal.      Palpations: Abdomen is soft.   Musculoskeletal:         General: Normal range of motion.      Cervical back: Normal range of motion and neck supple.      Right lower leg: No edema.      Left lower leg: No edema.   Skin:     General: Skin is warm and dry.      Findings: No rash.   Neurological:      General: No focal deficit present.      Mental Status: She is alert and oriented to person, place, and time.      GCS: GCS eye subscore is 4. GCS verbal subscore is 5. GCS motor subscore is 6.   Psychiatric:         Mood and Affect: Mood normal.         Speech: Speech normal.         Behavior: Behavior normal. Behavior is cooperative.         Cognition and Memory: Cognition normal.            Assessment and Plan     1. School physical exam  School physical exam performed. Paperwork provided by patient filled out and given back to patient at end of visit. No labs required at this time after reviewing labs/titers from [facility] done 2/25. Immunizations UTD.     Form filled out, signed, and given back to patient at end of visit,    Reach out with any further needs or questions.          Follow up if symptoms worsen or fail to improve.    40 minutes of total time spent on the encounter, which includes face to face time and non-face to face time preparing to see the patient (eg, review of tests), Obtaining and/or reviewing separately obtained history, Documenting clinical information in the electronic or other health record, Independently interpreting results (not separately reported) and communicating results to the patient/family/caregiver, or Care coordination (not separately reported).      Katelyn Villela, MSN, APRN, FNP-C  Family Medicine  Office #[phone]          [1]    No current outpatient medications on file.     No current facility-administered medications for this visit.

## 2025-08-26 ENCOUNTER — OFFICE VISIT (OUTPATIENT)
Dept: FAMILY MEDICINE | Facility: CLINIC | Age: 35
End: 2025-08-26
Payer: COMMERCIAL

## 2025-08-26 ENCOUNTER — LAB VISIT (OUTPATIENT)
Dept: LAB | Facility: HOSPITAL | Age: 35
End: 2025-08-26
Attending: FAMILY MEDICINE
Payer: COMMERCIAL

## 2025-08-26 VITALS
DIASTOLIC BLOOD PRESSURE: 80 MMHG | HEIGHT: 63 IN | OXYGEN SATURATION: 98 % | HEART RATE: 96 BPM | SYSTOLIC BLOOD PRESSURE: 108 MMHG | BODY MASS INDEX: 43.83 KG/M2 | TEMPERATURE: 99 F | WEIGHT: 247.38 LBS

## 2025-08-26 DIAGNOSIS — Z02.0 SCHOOL PHYSICAL EXAM: Primary | ICD-10-CM

## 2025-08-26 PROCEDURE — 99999 PR PBB SHADOW E&M-EST. PATIENT-LVL III: CPT | Mod: PBBFAC,,, | Performed by: FAMILY MEDICINE

## 2025-08-28 ENCOUNTER — RESULTS FOLLOW-UP (OUTPATIENT)
Dept: FAMILY MEDICINE | Facility: CLINIC | Age: 35
End: 2025-08-28
Payer: COMMERCIAL

## 2025-09-02 ENCOUNTER — TELEPHONE (OUTPATIENT)
Dept: FAMILY MEDICINE | Facility: CLINIC | Age: 35
End: 2025-09-02
Payer: COMMERCIAL